# Patient Record
Sex: MALE | Race: WHITE | Employment: OTHER | URBAN - METROPOLITAN AREA
[De-identification: names, ages, dates, MRNs, and addresses within clinical notes are randomized per-mention and may not be internally consistent; named-entity substitution may affect disease eponyms.]

---

## 2022-11-29 ENCOUNTER — APPOINTMENT (OUTPATIENT)
Dept: GENERAL RADIOLOGY | Age: 77
DRG: 377 | End: 2022-11-29
Attending: INTERNAL MEDICINE
Payer: MEDICARE

## 2022-11-29 ENCOUNTER — HOSPITAL ENCOUNTER (INPATIENT)
Age: 77
LOS: 15 days | Discharge: HOME HEALTH | DRG: 377 | End: 2022-12-14
Attending: INTERNAL MEDICINE | Admitting: INTERNAL MEDICINE
Payer: MEDICARE

## 2022-11-29 PROBLEM — K92.2 UPPER GI BLEED: Status: ACTIVE | Noted: 2022-11-29

## 2022-11-29 LAB
ABSOLUTE EOS #: 0.16 K/UL (ref 0–0.4)
ABSOLUTE IMMATURE GRANULOCYTE: 0 K/UL (ref 0–0.3)
ABSOLUTE LYMPH #: 1.75 K/UL (ref 1–4.8)
ABSOLUTE MONO #: 0.95 K/UL (ref 0.1–0.8)
ALBUMIN SERPL-MCNC: 2.7 G/DL (ref 3.5–5.2)
ALBUMIN/GLOBULIN RATIO: 1.7 (ref 1–2.5)
ALP BLD-CCNC: 80 U/L (ref 40–129)
ALT SERPL-CCNC: 16 U/L (ref 5–41)
ANION GAP SERPL CALCULATED.3IONS-SCNC: 12 MMOL/L (ref 9–17)
AST SERPL-CCNC: 73 U/L
BASOPHILS # BLD: 0 % (ref 0–2)
BASOPHILS ABSOLUTE: 0 K/UL (ref 0–0.2)
BILIRUB SERPL-MCNC: 0.3 MG/DL (ref 0.3–1.2)
BILIRUBIN DIRECT: 0.1 MG/DL
BILIRUBIN, INDIRECT: 0.2 MG/DL (ref 0–1)
BUN BLDV-MCNC: 35 MG/DL (ref 8–23)
CALCIUM SERPL-MCNC: 7.7 MG/DL (ref 8.6–10.4)
CASTS UA: NORMAL /LPF (ref 0–8)
CHLORIDE BLD-SCNC: 108 MMOL/L (ref 98–107)
CO2: 17 MMOL/L (ref 20–31)
CREAT SERPL-MCNC: 1.69 MG/DL (ref 0.7–1.2)
EOSINOPHILS RELATIVE PERCENT: 1 % (ref 1–4)
EPITHELIAL CELLS UA: NORMAL /HPF (ref 0–5)
FIO2: 40
GFR SERPL CREATININE-BSD FRML MDRD: 41 ML/MIN/1.73M2
GLUCOSE BLD-MCNC: 271 MG/DL (ref 75–110)
GLUCOSE BLD-MCNC: 283 MG/DL (ref 75–110)
GLUCOSE BLD-MCNC: 283 MG/DL (ref 75–110)
GLUCOSE BLD-MCNC: 348 MG/DL (ref 74–100)
GLUCOSE BLD-MCNC: 365 MG/DL (ref 70–99)
HCT VFR BLD CALC: 21.2 % (ref 40.7–50.3)
HCT VFR BLD CALC: 21.5 % (ref 40.7–50.3)
HCT VFR BLD CALC: 22.8 % (ref 40.7–50.3)
HEMOGLOBIN: 6.9 G/DL (ref 13–17)
HEMOGLOBIN: 7 G/DL (ref 13–17)
HEMOGLOBIN: 7.5 G/DL (ref 13–17)
IMMATURE GRANULOCYTES: 0 %
LACTIC ACID, WHOLE BLOOD: 2.7 MMOL/L (ref 0.7–2.1)
LIPASE: 10 U/L (ref 13–60)
LYMPHOCYTES # BLD: 11 % (ref 24–44)
MAGNESIUM: 2 MG/DL (ref 1.6–2.6)
MCH RBC QN AUTO: 30.6 PG (ref 25.2–33.5)
MCHC RBC AUTO-ENTMCNC: 32.9 G/DL (ref 28.4–34.8)
MCV RBC AUTO: 93.1 FL (ref 82.6–102.9)
MONOCYTES # BLD: 6 % (ref 1–7)
MORPHOLOGY: NORMAL
NEGATIVE BASE EXCESS, ART: 8 (ref 0–2)
NRBC AUTOMATED: 0.2 PER 100 WBC
O2 DEVICE/FLOW/%: ABNORMAL
PDW BLD-RTO: 14.2 % (ref 11.8–14.4)
PLATELET # BLD: 144 K/UL (ref 138–453)
PMV BLD AUTO: 10.2 FL (ref 8.1–13.5)
POC HCO3: 16.2 MMOL/L (ref 21–28)
POC O2 SATURATION: 100 % (ref 94–98)
POC PCO2: 28.6 MM HG (ref 35–48)
POC PH: 7.36 (ref 7.35–7.45)
POC PO2: 178.5 MM HG (ref 83–108)
POTASSIUM SERPL-SCNC: 4.8 MMOL/L (ref 3.7–5.3)
RBC # BLD: 2.45 M/UL (ref 4.21–5.77)
RBC UA: NORMAL /HPF (ref 0–4)
SEG NEUTROPHILS: 82 % (ref 36–66)
SEGMENTED NEUTROPHILS ABSOLUTE COUNT: 13.04 K/UL (ref 1.8–7.7)
SODIUM BLD-SCNC: 137 MMOL/L (ref 135–144)
TOTAL PROTEIN: 4.3 G/DL (ref 6.4–8.3)
TROPONIN, HIGH SENSITIVITY: 1391 NG/L (ref 0–22)
TROPONIN, HIGH SENSITIVITY: 910 NG/L (ref 0–22)
WBC # BLD: 15.9 K/UL (ref 3.5–11.3)
WBC UA: NORMAL /HPF (ref 0–5)

## 2022-11-29 PROCEDURE — 36415 COLL VENOUS BLD VENIPUNCTURE: CPT

## 2022-11-29 PROCEDURE — 85014 HEMATOCRIT: CPT

## 2022-11-29 PROCEDURE — 87641 MR-STAPH DNA AMP PROBE: CPT

## 2022-11-29 PROCEDURE — 85025 COMPLETE CBC W/AUTO DIFF WBC: CPT

## 2022-11-29 PROCEDURE — 0W3P8ZZ CONTROL BLEEDING IN GASTROINTESTINAL TRACT, VIA NATURAL OR ARTIFICIAL OPENING ENDOSCOPIC: ICD-10-PCS | Performed by: INTERNAL MEDICINE

## 2022-11-29 PROCEDURE — 6360000002 HC RX W HCPCS

## 2022-11-29 PROCEDURE — 2500000003 HC RX 250 WO HCPCS: Performed by: STUDENT IN AN ORGANIZED HEALTH CARE EDUCATION/TRAINING PROGRAM

## 2022-11-29 PROCEDURE — 2500000003 HC RX 250 WO HCPCS

## 2022-11-29 PROCEDURE — 37799 UNLISTED PX VASCULAR SURGERY: CPT

## 2022-11-29 PROCEDURE — C9113 INJ PANTOPRAZOLE SODIUM, VIA: HCPCS | Performed by: STUDENT IN AN ORGANIZED HEALTH CARE EDUCATION/TRAINING PROGRAM

## 2022-11-29 PROCEDURE — 44366 SMALL BOWEL ENDOSCOPY: CPT | Performed by: INTERNAL MEDICINE

## 2022-11-29 PROCEDURE — 99222 1ST HOSP IP/OBS MODERATE 55: CPT | Performed by: INTERNAL MEDICINE

## 2022-11-29 PROCEDURE — 83605 ASSAY OF LACTIC ACID: CPT

## 2022-11-29 PROCEDURE — 83735 ASSAY OF MAGNESIUM: CPT

## 2022-11-29 PROCEDURE — 2580000003 HC RX 258: Performed by: STUDENT IN AN ORGANIZED HEALTH CARE EDUCATION/TRAINING PROGRAM

## 2022-11-29 PROCEDURE — 99291 CRITICAL CARE FIRST HOUR: CPT | Performed by: INTERNAL MEDICINE

## 2022-11-29 PROCEDURE — 86901 BLOOD TYPING SEROLOGIC RH(D): CPT

## 2022-11-29 PROCEDURE — 2700000000 HC OXYGEN THERAPY PER DAY

## 2022-11-29 PROCEDURE — 2720000010 HC SURG SUPPLY STERILE: Performed by: INTERNAL MEDICINE

## 2022-11-29 PROCEDURE — 82947 ASSAY GLUCOSE BLOOD QUANT: CPT

## 2022-11-29 PROCEDURE — 87040 BLOOD CULTURE FOR BACTERIA: CPT

## 2022-11-29 PROCEDURE — 86920 COMPATIBILITY TEST SPIN: CPT

## 2022-11-29 PROCEDURE — 74018 RADEX ABDOMEN 1 VIEW: CPT

## 2022-11-29 PROCEDURE — 93005 ELECTROCARDIOGRAM TRACING: CPT | Performed by: STUDENT IN AN ORGANIZED HEALTH CARE EDUCATION/TRAINING PROGRAM

## 2022-11-29 PROCEDURE — 83036 HEMOGLOBIN GLYCOSYLATED A1C: CPT

## 2022-11-29 PROCEDURE — 2000000000 HC ICU R&B

## 2022-11-29 PROCEDURE — 86850 RBC ANTIBODY SCREEN: CPT

## 2022-11-29 PROCEDURE — 6360000002 HC RX W HCPCS: Performed by: STUDENT IN AN ORGANIZED HEALTH CARE EDUCATION/TRAINING PROGRAM

## 2022-11-29 PROCEDURE — 94761 N-INVAS EAR/PLS OXIMETRY MLT: CPT

## 2022-11-29 PROCEDURE — 82803 BLOOD GASES ANY COMBINATION: CPT

## 2022-11-29 PROCEDURE — 80048 BASIC METABOLIC PNL TOTAL CA: CPT

## 2022-11-29 PROCEDURE — 5A1955Z RESPIRATORY VENTILATION, GREATER THAN 96 CONSECUTIVE HOURS: ICD-10-PCS | Performed by: INTERNAL MEDICINE

## 2022-11-29 PROCEDURE — 3609013000 HC EGD TRANSORAL CONTROL BLEEDING ANY METHOD: Performed by: INTERNAL MEDICINE

## 2022-11-29 PROCEDURE — 81015 MICROSCOPIC EXAM OF URINE: CPT

## 2022-11-29 PROCEDURE — 71045 X-RAY EXAM CHEST 1 VIEW: CPT

## 2022-11-29 PROCEDURE — 94002 VENT MGMT INPAT INIT DAY: CPT

## 2022-11-29 PROCEDURE — 85018 HEMOGLOBIN: CPT

## 2022-11-29 PROCEDURE — 80076 HEPATIC FUNCTION PANEL: CPT

## 2022-11-29 PROCEDURE — 83690 ASSAY OF LIPASE: CPT

## 2022-11-29 PROCEDURE — 6370000000 HC RX 637 (ALT 250 FOR IP): Performed by: STUDENT IN AN ORGANIZED HEALTH CARE EDUCATION/TRAINING PROGRAM

## 2022-11-29 PROCEDURE — 87086 URINE CULTURE/COLONY COUNT: CPT

## 2022-11-29 PROCEDURE — 51702 INSERT TEMP BLADDER CATH: CPT

## 2022-11-29 PROCEDURE — 86900 BLOOD TYPING SEROLOGIC ABO: CPT

## 2022-11-29 PROCEDURE — 84484 ASSAY OF TROPONIN QUANT: CPT

## 2022-11-29 RX ORDER — NITROGLYCERIN 0.4 MG/1
0.4 TABLET SUBLINGUAL EVERY 5 MIN PRN
Status: ON HOLD | COMMUNITY
End: 2022-12-14 | Stop reason: HOSPADM

## 2022-11-29 RX ORDER — GLIPIZIDE 10 MG/1
10 TABLET, FILM COATED, EXTENDED RELEASE ORAL 2 TIMES DAILY
Status: ON HOLD | COMMUNITY
End: 2022-12-14 | Stop reason: HOSPADM

## 2022-11-29 RX ORDER — INSULIN LISPRO 100 [IU]/ML
0-8 INJECTION, SOLUTION INTRAVENOUS; SUBCUTANEOUS
Status: DISCONTINUED | OUTPATIENT
Start: 2022-11-29 | End: 2022-12-04

## 2022-11-29 RX ORDER — PROPOFOL 10 MG/ML
INJECTION, EMULSION INTRAVENOUS
Status: DISPENSED
Start: 2022-11-29 | End: 2022-11-30

## 2022-11-29 RX ORDER — ONDANSETRON 4 MG/1
4 TABLET, ORALLY DISINTEGRATING ORAL EVERY 8 HOURS PRN
Status: DISCONTINUED | OUTPATIENT
Start: 2022-11-29 | End: 2022-12-14 | Stop reason: HOSPADM

## 2022-11-29 RX ORDER — ACETAMINOPHEN 650 MG/1
650 SUPPOSITORY RECTAL EVERY 6 HOURS PRN
Status: DISCONTINUED | OUTPATIENT
Start: 2022-11-29 | End: 2022-12-14 | Stop reason: HOSPADM

## 2022-11-29 RX ORDER — SODIUM CHLORIDE 9 MG/ML
INJECTION, SOLUTION INTRAVENOUS PRN
Status: DISCONTINUED | OUTPATIENT
Start: 2022-11-29 | End: 2022-12-14 | Stop reason: HOSPADM

## 2022-11-29 RX ORDER — POTASSIUM CHLORIDE 29.8 MG/ML
20 INJECTION INTRAVENOUS PRN
Status: DISCONTINUED | OUTPATIENT
Start: 2022-11-29 | End: 2022-12-14 | Stop reason: HOSPADM

## 2022-11-29 RX ORDER — INSULIN LISPRO 100 [IU]/ML
0-4 INJECTION, SOLUTION INTRAVENOUS; SUBCUTANEOUS NIGHTLY
Status: DISCONTINUED | OUTPATIENT
Start: 2022-11-29 | End: 2022-12-04

## 2022-11-29 RX ORDER — SODIUM CHLORIDE 9 MG/ML
INJECTION, SOLUTION INTRAVENOUS CONTINUOUS
Status: DISCONTINUED | OUTPATIENT
Start: 2022-11-29 | End: 2022-12-05

## 2022-11-29 RX ORDER — PROPOFOL 10 MG/ML
INJECTION, EMULSION INTRAVENOUS
Status: COMPLETED
Start: 2022-11-29 | End: 2022-11-29

## 2022-11-29 RX ORDER — ACETAMINOPHEN 325 MG/1
650 TABLET ORAL EVERY 6 HOURS PRN
Status: DISCONTINUED | OUTPATIENT
Start: 2022-11-29 | End: 2022-12-14 | Stop reason: HOSPADM

## 2022-11-29 RX ORDER — DEXTROSE MONOHYDRATE 100 MG/ML
INJECTION, SOLUTION INTRAVENOUS CONTINUOUS PRN
Status: DISCONTINUED | OUTPATIENT
Start: 2022-11-29 | End: 2022-12-09 | Stop reason: SDUPTHER

## 2022-11-29 RX ORDER — NOREPINEPHRINE BIT/0.9 % NACL 16MG/250ML
INFUSION BOTTLE (ML) INTRAVENOUS
Status: COMPLETED
Start: 2022-11-29 | End: 2022-11-29

## 2022-11-29 RX ORDER — POLYETHYLENE GLYCOL 3350 17 G/17G
17 POWDER, FOR SOLUTION ORAL DAILY PRN
Status: DISCONTINUED | OUTPATIENT
Start: 2022-11-29 | End: 2022-12-14 | Stop reason: HOSPADM

## 2022-11-29 RX ORDER — INSULIN GLARGINE 100 [IU]/ML
10 INJECTION, SOLUTION SUBCUTANEOUS NIGHTLY
Status: DISCONTINUED | OUTPATIENT
Start: 2022-11-29 | End: 2022-11-30

## 2022-11-29 RX ORDER — PROPOFOL 10 MG/ML
5-50 INJECTION, EMULSION INTRAVENOUS CONTINUOUS
Status: DISCONTINUED | OUTPATIENT
Start: 2022-11-29 | End: 2022-12-01

## 2022-11-29 RX ORDER — ONDANSETRON 2 MG/ML
4 INJECTION INTRAMUSCULAR; INTRAVENOUS EVERY 6 HOURS PRN
Status: DISCONTINUED | OUTPATIENT
Start: 2022-11-29 | End: 2022-12-14 | Stop reason: HOSPADM

## 2022-11-29 RX ORDER — SODIUM CHLORIDE 0.9 % (FLUSH) 0.9 %
5-40 SYRINGE (ML) INJECTION PRN
Status: DISCONTINUED | OUTPATIENT
Start: 2022-11-29 | End: 2022-12-14 | Stop reason: HOSPADM

## 2022-11-29 RX ORDER — ESCITALOPRAM OXALATE 10 MG/1
10 TABLET ORAL DAILY
COMMUNITY

## 2022-11-29 RX ORDER — CARVEDILOL 6.25 MG/1
6.25 TABLET ORAL 2 TIMES DAILY WITH MEALS
Status: ON HOLD | COMMUNITY
End: 2022-12-14 | Stop reason: HOSPADM

## 2022-11-29 RX ORDER — INSULIN GLARGINE 100 [IU]/ML
20 INJECTION, SOLUTION SUBCUTANEOUS 2 TIMES DAILY
Status: ON HOLD | COMMUNITY
End: 2022-12-14 | Stop reason: HOSPADM

## 2022-11-29 RX ORDER — POTASSIUM CHLORIDE 7.45 MG/ML
10 INJECTION INTRAVENOUS PRN
Status: DISCONTINUED | OUTPATIENT
Start: 2022-11-29 | End: 2022-12-14 | Stop reason: HOSPADM

## 2022-11-29 RX ORDER — ASPIRIN 81 MG/1
81 TABLET ORAL DAILY
COMMUNITY

## 2022-11-29 RX ORDER — METFORMIN HYDROCHLORIDE 500 MG/1
500 TABLET, EXTENDED RELEASE ORAL 2 TIMES DAILY
COMMUNITY

## 2022-11-29 RX ORDER — OMEPRAZOLE 20 MG/1
20 CAPSULE, DELAYED RELEASE ORAL 2 TIMES DAILY
COMMUNITY

## 2022-11-29 RX ORDER — NOREPINEPHRINE BIT/0.9 % NACL 16MG/250ML
2-100 INFUSION BOTTLE (ML) INTRAVENOUS CONTINUOUS
Status: DISCONTINUED | OUTPATIENT
Start: 2022-11-29 | End: 2022-12-06

## 2022-11-29 RX ORDER — SODIUM CHLORIDE 0.9 % (FLUSH) 0.9 %
5-40 SYRINGE (ML) INJECTION EVERY 12 HOURS SCHEDULED
Status: DISCONTINUED | OUTPATIENT
Start: 2022-11-29 | End: 2022-12-14 | Stop reason: HOSPADM

## 2022-11-29 RX ORDER — ISOSORBIDE MONONITRATE 30 MG/1
30 TABLET, EXTENDED RELEASE ORAL DAILY
Status: ON HOLD | COMMUNITY
End: 2022-12-14 | Stop reason: HOSPADM

## 2022-11-29 RX ORDER — ROSUVASTATIN CALCIUM 5 MG/1
5 TABLET, COATED ORAL DAILY
COMMUNITY

## 2022-11-29 RX ORDER — MAGNESIUM SULFATE IN WATER 40 MG/ML
2000 INJECTION, SOLUTION INTRAVENOUS PRN
Status: DISCONTINUED | OUTPATIENT
Start: 2022-11-29 | End: 2022-12-14 | Stop reason: HOSPADM

## 2022-11-29 RX ADMIN — SODIUM CHLORIDE, PRESERVATIVE FREE 10 ML: 5 INJECTION INTRAVENOUS at 21:08

## 2022-11-29 RX ADMIN — Medication 5 MCG/MIN: at 14:16

## 2022-11-29 RX ADMIN — Medication 2 MCG/MIN: at 12:30

## 2022-11-29 RX ADMIN — INSULIN GLARGINE 10 UNITS: 100 INJECTION, SOLUTION SUBCUTANEOUS at 15:24

## 2022-11-29 RX ADMIN — PROPOFOL 35 MCG/KG/MIN: 10 INJECTION, EMULSION INTRAVENOUS at 22:07

## 2022-11-29 RX ADMIN — SODIUM CHLORIDE: 9 INJECTION, SOLUTION INTRAVENOUS at 14:24

## 2022-11-29 RX ADMIN — PROPOFOL 35 MCG/KG/MIN: 10 INJECTION, EMULSION INTRAVENOUS at 12:30

## 2022-11-29 RX ADMIN — INSULIN LISPRO 2 UNITS: 100 INJECTION, SOLUTION INTRAVENOUS; SUBCUTANEOUS at 19:37

## 2022-11-29 RX ADMIN — SODIUM CHLORIDE 8 MG/HR: 9 INJECTION, SOLUTION INTRAVENOUS at 14:56

## 2022-11-29 RX ADMIN — PROPOFOL 30 MCG/KG/MIN: 10 INJECTION, EMULSION INTRAVENOUS at 17:15

## 2022-11-29 RX ADMIN — Medication 50 MCG/HR: at 13:00

## 2022-11-29 RX ADMIN — PROPOFOL 35 MCG/KG/MIN: 10 INJECTION, EMULSION INTRAVENOUS at 14:22

## 2022-11-29 RX ADMIN — SODIUM CHLORIDE 80 MG: 9 INJECTION, SOLUTION INTRAVENOUS at 14:30

## 2022-11-29 ASSESSMENT — PULMONARY FUNCTION TESTS
PIF_VALUE: 10
PIF_VALUE: 24
PIF_VALUE: 24
PIF_VALUE: 20
PIF_VALUE: 20
PIF_VALUE: 26
PIF_VALUE: 24
PIF_VALUE: 24
PIF_VALUE: 14
PIF_VALUE: 10
PIF_VALUE: 26
PIF_VALUE: 20
PIF_VALUE: 24
PIF_VALUE: 24
PIF_VALUE: 26
PIF_VALUE: 19
PIF_VALUE: 19
PIF_VALUE: 25
PIF_VALUE: 20
PIF_VALUE: 26
PIF_VALUE: 26
PIF_VALUE: 24
PIF_VALUE: 26
PIF_VALUE: 26
PIF_VALUE: 20
PIF_VALUE: 20
PIF_VALUE: 26
PIF_VALUE: 24
PIF_VALUE: 13
PIF_VALUE: 27
PIF_VALUE: 20

## 2022-11-29 NOTE — PROGRESS NOTES
1440 PUSH EGD with control of hemorrhage at bedside room 3021. Patient intubated, sedated. HOB elevated slightly. Post procedure pt left in the care of primary nurse. Bed low & locked, side rails up x 4/4. Airway secure, oral bite block removed.

## 2022-11-29 NOTE — CONSULTS
[facility]    Consult / H&P               Today's Date: 11/29/2022  Patient Name: Lorenza Loja  Date of admission: 11/29/2022 12:20 PM  Patient's age: 68 y. o., 1945  Admission Dx: Upper GI bleed [K92.2]    Reason for Consult:  Cardiac evaluation    Requesting Physician: Danielito Pham MD    CHIEF COMPLAINT:  GI bleed    History Obtained From:  EMR    HISTORY OF PRESENT ILLNESS:      The patient is a 68 y.o. male with a history of CAD status post PCI X 3 in 2012 and CABG X2 with LIMA to distal LAD, SVG to OM1 with exploration of distal PDA 10/25/2022, A. fib with RVR on Coumadin and amiodarone initially presenting from [facility] after he complained of blood per rectum and was given multiple transfusions for anemia. GI was consulted and EGD was done twice last 10/28 with a duodenal ulcer. He was subsequently transferred to MICU at [facility] due to recurrent bleed and GI was consulted again who are planning emergent EGD at bedside today. Patient is currently intubated and sedated on Levophed. Cardiology was consulted for [facility] recommendations. Past Medical History:   has no past medical history on file. Past Surgical History:   has no past surgical history on file.      Home Medications:    Prior to Admission medications    Not on File      Current Facility-Administered Medications: sodium chloride flush 0.9 % injection 5-40 mL, 5-40 mL, IntraVENous, 2 times per day  sodium chloride flush 0.9 % injection 5-40 mL, 5-40 mL, IntraVENous, PRN  0.9 % sodium chloride infusion, , IntraVENous, PRN  ondansetron (ZOFRAN-ODT) disintegrating tablet 4 mg, 4 mg, Oral, Q8H PRN **OR** ondansetron (ZOFRAN) injection 4 mg, 4 mg, IntraVENous, Q6H PRN  polyethylene glycol (GLYCOLAX) packet 17 g, 17 g, Oral, Daily PRN  acetaminophen (TYLENOL) tablet 650 mg, 650 mg, Oral, Q6H PRN **OR** acetaminophen (TYLENOL) suppository 650 mg, 650 mg, Rectal, Q6H PRN  fentaNYL 50 mcg/mL 50 mL infusion,  mcg/hr, IntraVENous, Continuous  sodium phosphate 10 mmol in sodium chloride 0.9 % 250 mL IVPB, 10 mmol, IntraVENous, PRN **OR** sodium phosphate 15 mmol in sodium chloride 0.9 % 250 mL IVPB, 15 mmol, IntraVENous, PRN **OR** sodium phosphate 20 mmol in sodium chloride 0.9 % 500 mL IVPB, 20 mmol, IntraVENous, PRN  magnesium sulfate 2000 mg in 50 mL IVPB premix, 2,000 mg, IntraVENous, PRN  potassium chloride 20 mEq/50 mL IVPB (Central Line), 20 mEq, IntraVENous, PRN **OR** potassium chloride 10 mEq/100 mL IVPB (Peripheral Line), 10 mEq, IntraVENous, PRN  pantoprazole (PROTONIX) 80 mg in sodium chloride 0.9 % 50 mL bolus, 80 mg, IntraVENous, Once  pantoprazole (PROTONIX) 80 mg in sodium chloride 0.9 % 100 mL infusion, 8 mg/hr, IntraVENous, Continuous  norepinephrine (LEVOPHED) 16 mg in sodium chloride 0.9 % 250 mL infusion, 2-100 mcg/min, IntraVENous, Continuous  propofol injection, 5-50 mcg/kg/min, IntraVENous, Continuous  0.9 % sodium chloride infusion, , IntraVENous, Continuous  insulin glargine (LANTUS) injection vial 10 Units, 10 Units, SubCUTAneous, Nightly  insulin lispro (HUMALOG) injection vial 0-8 Units, 0-8 Units, SubCUTAneous, TID WC  insulin lispro (HUMALOG) injection vial 0-4 Units, 0-4 Units, SubCUTAneous, Nightly  glucose chewable tablet 16 g, 4 tablet, Oral, PRN  dextrose bolus 10% 125 mL, 125 mL, IntraVENous, PRN **OR** dextrose bolus 10% 250 mL, 250 mL, IntraVENous, PRN  glucagon (rDNA) injection 1 mg, 1 mg, SubCUTAneous, PRN  dextrose 10 % infusion, , IntraVENous, Continuous PRN    Allergies:  Atorvastatin    Social History:        Family History: family history is not on file. REVIEW OF SYSTEMS:    As above. PHYSICAL EXAM:      Pulse 69   Resp 13   Ht 6' 2\" (1.88 m)   Wt 207 lb 0.2 oz (93.9 kg)   BMI 26.58 kg/m²    Constitutional and General Appearance: Intubated  HEENT: PERRL, no cervical lymphadenopathy. No masses palpable.  Normal oral mucosa  Respiratory:  Normal excursion and expansion without use of accessory muscles  Resp Auscultation: Good respiratory effort. No for increased work of breathing. On auscultation: clear to auscultation bilaterally  Cardiovascular: The apical impulse is not displaced  Heart tones are crisp and normal. regular S1 and S2.  Jugular venous pulsation Normal  The carotid upstroke is normal in amplitude and contour without delay or bruit  Peripheral pulses are symmetrical and full   Abdomen:   No masses or tenderness  Bowel sounds present  Extremities:   No Cyanosis or Clubbing   Lower extremity edema: No   Skin: Warm and dry          Labs:   CBC: No results for input(s): WBC, HGB, HCT, PLT in the last 72 hours. BMP: No results for input(s): NA, K, CO2, BUN, CREATININE, LABGLOM, GLUCOSE in the last 72 hours. BNP: No results for input(s): BNP in the last 72 hours. PT/INR: No results for input(s): PROTIME, INR in the last 72 hours. APTT:No results for input(s): APTT in the last 72 hours. CARDIAC ENZYMES:No results for input(s): CKTOTAL, CKMB, CKMBINDEX, TROPONINI in the last 72 hours. FASTING LIPID PANEL:No results found for: HDL, LDLDIRECT, LDLCALC, TRIG  LIVER PROFILE:No results for input(s): AST, ALT, LABALBU in the last 72 hours. DATA:    Diagnostics:      ECHO:   04/2018  The left ventricle is normal size. There is normal left ventricular wall thickness. Left ventricle systolic function is normal.      The Ejection Fraction is 55-60%. Grade I diastolic dysfunction. Cannot rule out anteroapical hypokinesis seen only in apical 4 chamber views although endocardium   was not sen well. Echo 04/2022    Left Ventricle: Left ventricle appears normal in size. Wall thickness   is normal. Systolic function is low normal to mildly decreased with an   ejection fraction of 50-55%. Left Atrium: Left atrium is normal in size. The left atrial volume   index is 27.1 mL/m2.      Right Ventricle: Right ventricular size appears normal. The right   ventricular basal diameter is 35.0 mm. Systolic function is normal.     Mitral Valve: The leaflets are mildly thickened and exhibit normal   excursion. Aortic Valve: The aortic valve is congenitally bicuspid. The leaflets   exhibit normal excursion. There is moderate sclerosis. There is no   regurgitation. There is mild stenosis. The calculated aortic valve area is   1.69 cm2. The calculated aortic valve peak gradient is 11.83 mmHg. The   calculated aortic valve mean gradient is 6.00 mmHg. IMPRESSION:    Shock likely hypovolemic secondary to rectal bleed, currently on pressor support. CAD status post PCI X 3 in 2012 and CABG X2 with LIMA to distal LAD, SVG to OM1 with exploration of distal PDA 10/25/2022. A. fib which was newly diagnosed in November 2022, on Coumadin and amiodarone. Preserved LVEF on echocardiogram as above. Elevated troponin 910. Duodenal ulcer with duodenitis as per EGD at [facility]. Acute hypoxic respiratory failure secondary to above. Bicuspid Aortic valve. Hypertension. Hyperlipidemia. Chronic LBBB. RECOMMENDATIONS:  Patient is currently high risk for any AC due to ongoing GI bleed and hypovolemic shock. Will await GI clearance before restarting any AC. Repeat one set of troponin. Will continue to follow. Supportive care for now. Talia Bonilla MD       Cardiovascular Fellow  11/29/2022, 3:29 PM    Attending note. The patient was seen and examined agree with above. Intubated and sedated. Presented with GI bleeding. Elevated troponins possible type II and will trend. We will continue to observe for now and okay to hold anticoagulation until cleared by GI service.

## 2022-11-29 NOTE — OP NOTE
EGD      Patient:   Selma Vinson    :    1945    Facility:   [facility]   Referring/PCP: Jon Gutierrez    Procedure:   Esophagogastroduodenoscopy with control of bleeding  Date:     2022   Endoscopist:  Anayeli Haskins MD, [facility]    Indication:   Melena, anemia of acute blood loss requiring multiple units of blood transfusion. Postprocedure diagnosis:   Duodenal ulcer with visible vessel cauterized with bipolar probe, old blood in the stomach. Anesthesia:  None, patient was already intubated and sedated in the intensive care unit. Complications: None    EBL: None from the procedure    Specimen: None    Description of Procedure:  Informed consent was obtained from the patient after explanation of the procedure including indications, description of the procedure,  benefits and possible risks and complications of the procedure, and alternatives. Questions were answered. The patient's history was reviewed and a directed physical examination was performed prior to the procedure. Patient was monitored throughout the procedure with pulse oximetry and periodic assessment of vital signs. Patient was sedated as noted above. With the patient in the left lateral decubitus position, the Olympus videoendoscope was placed in the patient's mouth and under direct visualization passed into the esophagus. Visualization of the esophagus, stomach, and duodenum was performed during both introduction and withdrawal of the endoscope and retroflexed view of the proximal stomach was obtained. The scope was passed to the proximal jejunum. The patient tolerated the procedure well and was taken to the recovery area in good condition. Findings[de-identified]   Esophagus: Normal  Stomach: Stomach had small amount of old blood which was lavaged and cleared. Stomach otherwise appeared normal.  Duodenum: There was a duodenal ulcer in the bulb measuring about 15 mm x 8 mm with a visible vessel.   This was cauterized with bipolar probe at 13 W. There was no bleeding at the end of the procedure. The rest of the duodenum was otherwise normal.  Jejunum: The examined portion of proximal jejunum was normal.    Recommendations:   Continue with PPI drip for another 24 to 48 hours and then twice daily for 8 weeks. Okay to extubate from GI standpoint. Once extubated okay for trial of liquids or tube feeds if fails extubation.     Electronically signed by Angle Hauser MD, FACG  on 11/29/2022 at 3:41 PM

## 2022-11-29 NOTE — H&P
Critical Care - History and Physical Examination    Patient's name:  Barb Mulligan  Medical Record Number: [id number]  Patient's account/billing number: [de-identified]  Patient's YOB: 1945  Age: 68 y.o. Date of Admission: 11/29/2022 12:20 PM  Reason of ICU admission:   Date of History and Physical Examination: 11/29/2022      Primary Care Physician: Froilan Olivera  Attending Physician:    Code Status: Full Code    Chief complaint: GI Bleeding    Reason for ICU admission:       History Of Present Illness:   History was obtained from chart review. Barb Mulligan is a 68 y.o. presented from [facility] with past medical history significant for CAD s/p CABG 10/25/2022, atrial fibrillation on Coumadin, diabetes, , HTN, HLD, depression. Presented for painless rectal bleeding multiple times with hg dropped from 8.6 to 6.8. received 2 units PRBC, 2 units FFP, and 1 unit cryoprecipitate as well as TXA, 10 mg of vitamin K.and 2000 units Kcentra for INR of 3.09. EGD 11/28/2022 and found to have a duodenal ulcer with duodenitis but no active bleeding. He had a colonoscopy with reports of no source of bleeding found. According to the chart of transfer, patient had multiple episodes of hematemesis and the repeat EGD showed old blood and no active duodenal ulcer treated with epinephrine. Patient was intebated and transfereed to [facility] for higher care. Past Medical History:  CAD S/p CABG, HTN, HLD    Past Surgical History:  CABG    Allergies: Allergies   Allergen Reactions    Atorvastatin Hives         Home Medications:   Prior to Admission medications    Not on File       Social History:   TOBACCO:   has no history on file for tobacco use. ETOH:   has no history on file for alcohol use. DRUGS:  has no history on file for drug use. OCCUPATION:      Family History:   No family history on file.         REVIEW OF SYSTEMS (ROS):  Review of Systems - Negative except mentioned in HPI General ROS: negative  Psychological ROS: negative  Ophthalmic ROS: negative  ENT: negative  Hematological and Lymphatic ROS: negative  Endocrine ROS: negative  Breast ROS: negative  Respiratory ROS: no cough, shortness of breath, or wheezing  Cardiovascular ROS: no chest pain or dyspnea on exertion  Gastrointestinal ROS:negative  Genito-Urinary ROS: negative  Musculoskeletal ROS: negative  Neurological ROS: negative  Dermatological ROS: negative      Physical Exam:    Vitals: Pulse 70   Temp 98.4 °F (36.9 °C) (Oral)   Resp 10   Ht 6' 2\" (1.88 m)   Wt 207 lb 0.2 oz (93.9 kg)   SpO2 100%   BMI 26.58 kg/m²     Body weight:   Wt Readings from Last 3 Encounters:   11/29/22 207 lb 0.2 oz (93.9 kg)       Body Mass Index : Body mass index is 26.58 kg/m². PHYSICAL EXAMINATION :  Constitutional: Appears well, in no distress  EENT: PERRLA, EOMI, sclera clear, anicteric, oropharynx clear, no lesions, neck supple with midline trachea. Neck: Supple, symmetrical, trachea midline, no adenopathy, thyroid symmetric, no jvd skin normal  Respiratory: ET, clear to auscultation, no wheezes or rales and unlabored breathing. No intercostal tenderness  Cardiovascular: regular rate and rhythm, normal S1, S2, no murmur noted and 2+ pulses throughout  Abdomen: soft, nontender, nondistended, no masses or organomegaly  Neurological:  Extremities:  peripheral pulses normal, no pedal edema, no clubbing or cyanosis      Laboratory findings:-    CBC:   Recent Labs     11/29/22  1406   WBC 15.9*   HGB 7.5*        BMP:    Recent Labs     11/29/22  1406      K 4.8   *   CO2 17*   BUN 35*   CREATININE 1.69*   GLUCOSE 365*     S. Calcium:  Recent Labs     11/29/22  1406   CALCIUM 7.7*     S. Ionized Calcium:No results for input(s): IONCA in the last 72 hours. S. Magnesium:No results for input(s): MG in the last 72 hours. S. Phosphorus:No results for input(s): PHOS in the last 72 hours.   S. Glucose:  Recent Labs 11/29/22  1329   POCGLU 348*     Glycosylated hemoglobin A1C: No results for input(s): LABA1C in the last 72 hours. INR: No results for input(s): INR in the last 72 hours. Hepatic functions:   Recent Labs     11/29/22  1406   ALKPHOS 80   ALT 16   AST 73*   PROT 4.3*   BILITOT 0.3   BILIDIR 0.1   LABALBU 2.7*     Pancreatic functions:No results for input(s): LACTA, AMYLASE in the last 72 hours. S. Lactic Acid: No results for input(s): LACTA in the last 72 hours. Cardiac enzymes:No results for input(s): CKTOTAL, CKMB, CKMBINDEX, TROPONINI in the last 72 hours. BNP:No results for input(s): BNP in the last 72 hours. Lipid profile: No results for input(s): CHOL, TRIG, HDL, LDL, LDLCALC in the last 72 hours. Blood Gases: No results found for: PH, PCO2, PO2, HCO3, O2SAT  Thyroid functions: No results found for: T4, TSH     Urinalysis:     Microbiology:  Cultures during this admission:     Blood cultures:                 [] None drawn      [] Negative             []  Positive (Details:  )  Urine Culture:                   [] None drawn      [] Negative             []  Positive (Details:  )  Sputum Culture:               [] None drawn       [] Negative             []  Positive (Details:  )   Endotracheal aspirate:     [] None drawn       [] Negative             []  Positive (Details:  )         -----------------------------------------------------------------  Radiological reports:    CXR:    Electrocardiogram:     Last Echocardiogram findings:          Assessment and Plan     Patient Active Problem List   Diagnosis    Upper GI bleed         Upper GI bleeding:  - Transfused 3 Units of blood  - Duodenal ulcer  - GI consulted. -     Acute hypoxic respiratory failure secondary to above. - intubated and sedated     DM:  - Lantus 10 nithgtly  - MDSS    CAD s/p CABAG  CAD status post PCI X 3 in 2012 and CABG X2 with LIMA to distal LAD, SVG to OM1 with exploration of distal PDA 10/25/2022    Newly diagnosed A fib   A. fib which was newly diagnosed in November 2022, on Coumadin and amiodarone    HTN  - Home meds held due to low BP    HLD  Lipitor at home    F.A.STEODORA MHenna H.U.G.S. B.I.D. Feeding Diet: Diet NPO  Fluids: NS 50 ml/hr  Family: Updated  Analgesic: Fentanyl  Sedation: Propofol  Thrombo-prophylaxis: [] Enoxaparin, [] Unfract. Heparin Subcutaneously, [x] EPC Cuffs  Mobility: Immobile  Heads up: N/A  Ulcer prophylaxis: [] PPI Agent, [] [id number], [] Sucralfate, [] Other:  Glycemic control: Lantus 10 nightly and MDSS  Spontaneous breathing trial: Tubed today  Bowel regimen/urine output: N/A  Indwelling catheter/lines: CVT less, Lorena, ET, Art line  De-escalation: extubation and transfer down    Prophylaxis:  DVT: Chemical contraindicated at  GI: IV Protonix    Dispo:   To remain in [address] [place] [address] MD Xavier   Internal Medicine - PGY-1  Intensive Care Unit  11/29/2022, 4:01 PM

## 2022-11-29 NOTE — CONSULTS
[facility]    GASTROENTEROLOGY CONSULT    Patient:   Lorenza Loja   :    1945   Facility:   [facility]   Date:    2022  Admission Dx:  Upper GI bleed [K92.2]  Requesting physician: Danielito Pham MD  Reason for consult: GI bleed   CC : GI bleed    SUBJECTIVE     HISTORY OF PRESENT ILLNESS  This is a 68 y.o.   male who was admitted 2022 with Upper GI bleed [K92.2]. We have been asked to see the patient in consultation by Danielito Pham MD for GI bleed. Patient is currently intubated. Information obtained from the chart of [facility] as well as our staff who received report from [facility]. 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 Hemoglobin initially 8.6 g/dL and dropped to 6.8 g/dL. He was seen by general surgery Dr. Myla Snider, Per chart, patient had multiple tarry and maroonish colored bowel movements. He did undergo a EGD 2022 and found to have a duodenal ulcer with duodenitis but no active bleeding. He had a colonoscopy with reports of no source of bleeding found. Per chart, early this a.m., patient was reported to have vomiting blood as well as bright red blood per rectum and became hypotensive and tachycardic. Hemoglobin declined to 6.4 g/dL where he received 2 units PRBC, 2 units FFP, and 1 unit cryoprecipitate as well as TXA, 10 mg of vitamin K.and 2000 units Kcentra for INR of 3.09. Patient was intubated and repeat EGD was performed this morning showing \"a fair amount of old blood in the fundus but no other old blood\". Large duodenal ulcer that was not actively bleeding though treated with epinephrine. Patient was  transfered  to  for higher level of care . Patient seen and examined while in the ICU. Patient currently intubated, sedated on propofol and on pressors. Information obtained from the wife and daughter-in-law at bedside. Wife reports patient was having some nausea and bile colored emesis prior to developing melena. She reports patient had a huge stool with melena as well as bright red blood on day of presentation to [facility].  She reports a remote history of GI bleed but does not know the specifics as she is recently been  to patient. She reports patient is on aspirin for his heart, otherwise no significant NSAID use. No history of alcohol use. Remote history of tobacco use. Location/Symptom:   Timing/Onset:   Provocation:   Quality:   Radiation:   Severity:   Timing/Duration:   Modifying Factors:         Summary of imaging completed at this time:      Summary of labs completed at this time:      Previous GI history:   EGD and colonoscopy as above. Approximately 4 colonoscopies in past due to history of polyps      OBJECTIVE:     PAST MEDICAL/SURGICAL HISTORY  As outlined above as well as cholecystectomy and multiple skin surgeries for history of skin cancer    ALLERGIES:  Allergies   Allergen Reactions    Atorvastatin Hives       HOME MEDICATIONS:  Reviewed with with and include aspirin, and Coumadin    CURRENT MEDICATIONS:  Scheduled Meds:   sodium chloride flush  5-40 mL IntraVENous 2 times per day    pantoprazole (PROTONIX) bolus  80 mg IntraVENous Once    insulin glargine  10 Units SubCUTAneous Nightly    insulin lispro  0-8 Units SubCUTAneous TID WC    insulin lispro  0-4 Units SubCUTAneous Nightly     Continuous Infusions:   sodium chloride      fentaNYL 50 mcg/mL 50 mcg/hr (11/29/22 1422)    pantoprazole      norepinephrine 7 mcg/min (11/29/22 1422)    propofol 35 mcg/kg/min (11/29/22 1422)    sodium chloride 50 mL/hr at 11/29/22 1424    dextrose       PRN Meds:    SOCIAL HISTORY:     Tobacco:   Remote history of tobacco use.   Alcohol:   has no history of alcohol use. Illicit drugs:  has no history of drug use. FAMILY HISTORY:     No family history on file. REVIEW OF SYSTEMS:    GLORIA     PHYSICAL EXAM:    Pulse 69   Resp 13   Ht 6' 2\" (1.88 m)   Wt 207 lb 0.2 oz (93.9 kg)   BMI 26.58 kg/m²     GENERAL:   Intubated, sedated, No apparent distress  HEAD:   Normocephalic, Atraumatic  EENT:   EOMI, Sclera not icteric, Oropharynx moist   NECK:   Supple, Trachea midline  LUNGS:  CTA Bilaterally on vent  HEART:  RRR, No murmur auscultated. Recent CABG scar healing well  ABDOMEN:   Soft, Nondistended, BS WNL  EXT:   No clubbing. No cyanosis. No edema. SKIN:   No rashes. No jaundice. No stigmata of liver disease. MUSC/SKEL:   Adequate muscle bulk for patient's age, No significant synovitis, No deformities  NEURO:  GLORIA      LABS AND IMAGING:     CBC  No results for input(s): WBC, HGB, HCT, MCV, MCH, MCHC, PLT in the last 72 hours. IMMATURE PLTs  No results found for: PLTFLUORE    BMP  No results for input(s): NA, K, CL, CO2, BUN, CREATININE, GLUCOSE, CALCIUM in the last 72 hours. Invalid input(s):  MG,  PHOS;3    LFTS  No results for input(s): ALKPHOS, BILITOT, BILIDIR, AST, ALT, PROT, LABALBU in the last 72 hours. AMYLASE/LIPASE/AMMONIA  No results for input(s): AMYLASE, LIPASE, AMMONIA in the last 72 hours. PT/INR  No results for input(s): PROTIME, INR in the last 72 hours. ANEMIA STUDIES  No results for input(s): IRON, LABIRON, TIBC, UIBC, FERRITIN, [id number], FOLATE, OCCULTBLD in the last 72 hours.       LIVER WORK UP:    Acute Hepatitis Panel   No results found for: HEPBSAG, HEPCAB, HEPBIGM, HEPAIGM    HCV Genotype   No results found for: HEPATITISCGENOTYPE    HCV Quantitative   No results found for: HCVQNT    AFP  No results found for: AFP    Alpha 1 antitrypsin   No results found for: A1A    Anti - Liver/Kidney Ab  No results found for: LIVER-KIDNEYMICROSOMALAB    ALFREDITO  No results found for: ALFREDITO    AMA  No results found for: MITOAB    ASMA  No results found for: SMOOTHMUSCAB    Ceruloplasmin  No results found for: CERULOPLSM    Celiac panel  No results found for: TISSTRNTIIGG, TTGIGA, IGA    IgG  No results found for: IGG    IgM  No results found for: IGM    GGT   No results found for: LABGGT    PT/INR  No results for input(s): PROTIME, INR in the last 72 hours. Cancer Markers:  CEA:  No results found for: CEA  Ca 125:  No results found for:   Ca 19-9:   No results found for:   AFP: No results found for: AFP    Lactic acid:  Recent Labs     11/29/22  1406   LACTACIDWB 2.7*           IMAGING  No results found. IMPRESSION:     1.  GI bleed with melena -suspected secondary to duodenal ulcers  2. Duodenal ulcer on EGDs at OSH  3. Acute blood loss anemia secondary to GI blood  4. CABG 10/2022 on Coumadin  5. Afib       Old records, labs and imaging reviewed. PLAN   1. We will plan for EGD today with push enteroscopy today to assess for any further source of GI bleeding. This was discussed with patient's wife at bedside who agreed for procedure. 2.  Start Protonix drip  3. Monitor serial hemoglobin and hematocrit. Transfuse to keep hemoglobin greater than 8 g/dL (cardiac history)  4. If EGD is nondiagnostic and patient continues to have signs of significant GI bleeding, can consider CTA and or nuclear med bleeding scan  5. Further plans to follow EGD      Initial care time/code: Spent 80 out of 80 minutes on this date of service including chart prep, patient interaction, discussion with primary team, documentation and coordination of care for the above conditions    This plan was formulated in collaboration with Dr. Rhea Hopkins  Thank you for allowing us to participate in the care of your patient. Electronically signed by: JERALD Campbell CNP on 11/29/2022 at 2:37 PM     Please note that this note was generated using a voice recognition dictation software.   Although every effort was made to ensure the accuracy of this automated transcription, some errors in transcription may have occurred.

## 2022-11-30 LAB
ABSOLUTE EOS #: 0.12 K/UL (ref 0–0.44)
ABSOLUTE IMMATURE GRANULOCYTE: 0.07 K/UL (ref 0–0.3)
ABSOLUTE LYMPH #: 1.96 K/UL (ref 1.1–3.7)
ABSOLUTE MONO #: 1.17 K/UL (ref 0.1–1.2)
ACTION: NORMAL
ALLEN TEST: POSITIVE
ANION GAP SERPL CALCULATED.3IONS-SCNC: 9 MMOL/L (ref 9–17)
BASOPHILS # BLD: 0 % (ref 0–2)
BASOPHILS ABSOLUTE: 0.04 K/UL (ref 0–0.2)
BUN BLDV-MCNC: 29 MG/DL (ref 8–23)
CALCIUM SERPL-MCNC: 7.6 MG/DL (ref 8.6–10.4)
CHLORIDE BLD-SCNC: 108 MMOL/L (ref 98–107)
CO2: 16 MMOL/L (ref 20–31)
CREAT SERPL-MCNC: 1.42 MG/DL (ref 0.7–1.2)
CULTURE: NO GROWTH
DATE AND TIME: NORMAL
EKG ATRIAL RATE: 70 BPM
EKG P AXIS: 66 DEGREES
EKG P-R INTERVAL: 164 MS
EKG Q-T INTERVAL: 536 MS
EKG QRS DURATION: 152 MS
EKG QTC CALCULATION (BAZETT): 578 MS
EKG R AXIS: 50 DEGREES
EKG T AXIS: -172 DEGREES
EKG VENTRICULAR RATE: 70 BPM
EOSINOPHILS RELATIVE PERCENT: 1 % (ref 1–4)
FIO2: 30
GFR SERPL CREATININE-BSD FRML MDRD: 51 ML/MIN/1.73M2
GLUCOSE BLD-MCNC: 175 MG/DL (ref 75–110)
GLUCOSE BLD-MCNC: 197 MG/DL (ref 75–110)
GLUCOSE BLD-MCNC: 213 MG/DL (ref 75–110)
GLUCOSE BLD-MCNC: 253 MG/DL (ref 75–110)
GLUCOSE BLD-MCNC: 303 MG/DL (ref 70–99)
GLUCOSE BLD-MCNC: 310 MG/DL (ref 75–110)
GLUCOSE BLD-MCNC: 312 MG/DL (ref 74–100)
HCO3 VENOUS: 18.8 MMOL/L (ref 22–29)
HCT VFR BLD CALC: 22.4 % (ref 40.7–50.3)
HCT VFR BLD CALC: 26.6 % (ref 40.7–50.3)
HCT VFR BLD CALC: 28.1 % (ref 40.7–50.3)
HEMOGLOBIN: 7.5 G/DL (ref 13–17)
HEMOGLOBIN: 8.4 G/DL (ref 13–17)
HEMOGLOBIN: 8.7 G/DL (ref 13–17)
IMMATURE GRANULOCYTES: 1 %
INR BLD: 1.3
LACTIC ACID, WHOLE BLOOD: 1 MMOL/L (ref 0.7–2.1)
LACTIC ACID, WHOLE BLOOD: 1.4 MMOL/L (ref 0.7–2.1)
LACTIC ACID, WHOLE BLOOD: 2.4 MMOL/L (ref 0.7–2.1)
LACTIC ACID, WHOLE BLOOD: 2.6 MMOL/L (ref 0.7–2.1)
LYMPHOCYTES # BLD: 17 % (ref 24–43)
MCH RBC QN AUTO: 31.1 PG (ref 25.2–33.5)
MCHC RBC AUTO-ENTMCNC: 33.5 G/DL (ref 28.4–34.8)
MCV RBC AUTO: 92.9 FL (ref 82.6–102.9)
MODE: ABNORMAL
MONOCYTES # BLD: 10 % (ref 3–12)
MRSA, DNA, NASAL: NEGATIVE
NEGATIVE BASE EXCESS, VEN: 6 (ref 0–2)
NOTIFY: NORMAL
NRBC AUTOMATED: 0.2 PER 100 WBC
O2 DEVICE/FLOW/%: ABNORMAL
O2 SAT, VEN: 44 % (ref 60–85)
PCO2, VEN: 35.2 MM HG (ref 41–51)
PDW BLD-RTO: 14.6 % (ref 11.8–14.4)
PH VENOUS: 7.33 (ref 7.32–7.43)
PLATELET # BLD: 122 K/UL (ref 138–453)
PMV BLD AUTO: 9.8 FL (ref 8.1–13.5)
PO2, VEN: 25.9 MM HG (ref 30–50)
POTASSIUM SERPL-SCNC: 4.4 MMOL/L (ref 3.7–5.3)
PROTHROMBIN TIME: 13.4 SEC (ref 9.1–12.3)
RBC # BLD: 2.41 M/UL (ref 4.21–5.77)
RBC # BLD: ABNORMAL 10*6/UL
READ BACK: YES
SAMPLE SITE: ABNORMAL
SEG NEUTROPHILS: 70 % (ref 36–65)
SEGMENTED NEUTROPHILS ABSOLUTE COUNT: 8.01 K/UL (ref 1.5–8.1)
SODIUM BLD-SCNC: 133 MMOL/L (ref 135–144)
SPECIMEN DESCRIPTION: NORMAL
SPECIMEN DESCRIPTION: NORMAL
TRIGL SERPL-MCNC: 241 MG/DL
TROPONIN, HIGH SENSITIVITY: 1129 NG/L (ref 0–22)
TROPONIN, HIGH SENSITIVITY: 1223 NG/L (ref 0–22)
TROPONIN, HIGH SENSITIVITY: 731 NG/L (ref 0–22)
TROPONIN, HIGH SENSITIVITY: 844 NG/L (ref 0–22)
WBC # BLD: 11.4 K/UL (ref 3.5–11.3)

## 2022-11-30 PROCEDURE — 2000000000 HC ICU R&B

## 2022-11-30 PROCEDURE — 6360000002 HC RX W HCPCS

## 2022-11-30 PROCEDURE — 99291 CRITICAL CARE FIRST HOUR: CPT | Performed by: INTERNAL MEDICINE

## 2022-11-30 PROCEDURE — 2500000003 HC RX 250 WO HCPCS: Performed by: STUDENT IN AN ORGANIZED HEALTH CARE EDUCATION/TRAINING PROGRAM

## 2022-11-30 PROCEDURE — 85610 PROTHROMBIN TIME: CPT

## 2022-11-30 PROCEDURE — 2700000000 HC OXYGEN THERAPY PER DAY

## 2022-11-30 PROCEDURE — 82803 BLOOD GASES ANY COMBINATION: CPT

## 2022-11-30 PROCEDURE — 2580000003 HC RX 258: Performed by: STUDENT IN AN ORGANIZED HEALTH CARE EDUCATION/TRAINING PROGRAM

## 2022-11-30 PROCEDURE — 36430 TRANSFUSION BLD/BLD COMPNT: CPT

## 2022-11-30 PROCEDURE — 36600 WITHDRAWAL OF ARTERIAL BLOOD: CPT

## 2022-11-30 PROCEDURE — 99232 SBSQ HOSP IP/OBS MODERATE 35: CPT | Performed by: INTERNAL MEDICINE

## 2022-11-30 PROCEDURE — 83605 ASSAY OF LACTIC ACID: CPT

## 2022-11-30 PROCEDURE — 85018 HEMOGLOBIN: CPT

## 2022-11-30 PROCEDURE — APPSS30 APP SPLIT SHARED TIME 16-30 MINUTES: Performed by: INTERNAL MEDICINE

## 2022-11-30 PROCEDURE — 6370000000 HC RX 637 (ALT 250 FOR IP): Performed by: STUDENT IN AN ORGANIZED HEALTH CARE EDUCATION/TRAINING PROGRAM

## 2022-11-30 PROCEDURE — P9016 RBC LEUKOCYTES REDUCED: HCPCS

## 2022-11-30 PROCEDURE — 80048 BASIC METABOLIC PNL TOTAL CA: CPT

## 2022-11-30 PROCEDURE — 86900 BLOOD TYPING SEROLOGIC ABO: CPT

## 2022-11-30 PROCEDURE — 85025 COMPLETE CBC W/AUTO DIFF WBC: CPT

## 2022-11-30 PROCEDURE — 2500000003 HC RX 250 WO HCPCS: Performed by: HEALTH CARE PROVIDER

## 2022-11-30 PROCEDURE — C9113 INJ PANTOPRAZOLE SODIUM, VIA: HCPCS | Performed by: STUDENT IN AN ORGANIZED HEALTH CARE EDUCATION/TRAINING PROGRAM

## 2022-11-30 PROCEDURE — 93010 ELECTROCARDIOGRAM REPORT: CPT | Performed by: INTERNAL MEDICINE

## 2022-11-30 PROCEDURE — 84478 ASSAY OF TRIGLYCERIDES: CPT

## 2022-11-30 PROCEDURE — 94003 VENT MGMT INPAT SUBQ DAY: CPT

## 2022-11-30 PROCEDURE — 36415 COLL VENOUS BLD VENIPUNCTURE: CPT

## 2022-11-30 PROCEDURE — 6360000002 HC RX W HCPCS: Performed by: STUDENT IN AN ORGANIZED HEALTH CARE EDUCATION/TRAINING PROGRAM

## 2022-11-30 PROCEDURE — 82947 ASSAY GLUCOSE BLOOD QUANT: CPT

## 2022-11-30 PROCEDURE — 94761 N-INVAS EAR/PLS OXIMETRY MLT: CPT

## 2022-11-30 PROCEDURE — 85014 HEMATOCRIT: CPT

## 2022-11-30 PROCEDURE — 84484 ASSAY OF TROPONIN QUANT: CPT

## 2022-11-30 RX ORDER — DEXMEDETOMIDINE HYDROCHLORIDE 4 UG/ML
.1-1.5 INJECTION, SOLUTION INTRAVENOUS CONTINUOUS
Status: DISCONTINUED | OUTPATIENT
Start: 2022-11-30 | End: 2022-12-06

## 2022-11-30 RX ORDER — SODIUM CHLORIDE 9 MG/ML
INJECTION, SOLUTION INTRAVENOUS PRN
Status: COMPLETED | OUTPATIENT
Start: 2022-11-30 | End: 2022-11-30

## 2022-11-30 RX ORDER — PROPOFOL 10 MG/ML
INJECTION, EMULSION INTRAVENOUS
Status: COMPLETED
Start: 2022-11-30 | End: 2022-11-30

## 2022-11-30 RX ORDER — SODIUM CHLORIDE 9 MG/ML
INJECTION, SOLUTION INTRAVENOUS PRN
Status: DISCONTINUED | OUTPATIENT
Start: 2022-11-30 | End: 2022-12-08

## 2022-11-30 RX ORDER — INSULIN GLARGINE 100 [IU]/ML
15 INJECTION, SOLUTION SUBCUTANEOUS 2 TIMES DAILY
Status: DISCONTINUED | OUTPATIENT
Start: 2022-11-30 | End: 2022-12-04

## 2022-11-30 RX ORDER — PROPOFOL 10 MG/ML
INJECTION, EMULSION INTRAVENOUS
Status: DISPENSED
Start: 2022-11-30 | End: 2022-11-30

## 2022-11-30 RX ADMIN — SODIUM CHLORIDE 8 MG/HR: 9 INJECTION, SOLUTION INTRAVENOUS at 11:08

## 2022-11-30 RX ADMIN — DEXMEDETOMIDINE HYDROCHLORIDE 0.1 MCG/KG/HR: 4 INJECTION, SOLUTION INTRAVENOUS at 08:06

## 2022-11-30 RX ADMIN — PROPOFOL 35 MCG/KG/MIN: 10 INJECTION, EMULSION INTRAVENOUS at 03:36

## 2022-11-30 RX ADMIN — SODIUM CHLORIDE, PRESERVATIVE FREE 10 ML: 5 INJECTION INTRAVENOUS at 08:25

## 2022-11-30 RX ADMIN — DEXMEDETOMIDINE HYDROCHLORIDE 1 MCG/KG/HR: 4 INJECTION, SOLUTION INTRAVENOUS at 16:08

## 2022-11-30 RX ADMIN — DEXMEDETOMIDINE HYDROCHLORIDE 1.2 MCG/KG/HR: 4 INJECTION, SOLUTION INTRAVENOUS at 20:02

## 2022-11-30 RX ADMIN — SODIUM CHLORIDE: 9 INJECTION, SOLUTION INTRAVENOUS at 01:16

## 2022-11-30 RX ADMIN — Medication 50 MCG/HR: at 12:33

## 2022-11-30 RX ADMIN — SODIUM CHLORIDE, PRESERVATIVE FREE 10 ML: 5 INJECTION INTRAVENOUS at 19:55

## 2022-11-30 RX ADMIN — INSULIN GLARGINE 15 UNITS: 100 INJECTION, SOLUTION SUBCUTANEOUS at 20:58

## 2022-11-30 RX ADMIN — SODIUM CHLORIDE 8 MG/HR: 9 INJECTION, SOLUTION INTRAVENOUS at 01:03

## 2022-11-30 RX ADMIN — SODIUM CHLORIDE: 9 INJECTION, SOLUTION INTRAVENOUS at 11:24

## 2022-11-30 RX ADMIN — SODIUM CHLORIDE 8 MG/HR: 9 INJECTION, SOLUTION INTRAVENOUS at 23:31

## 2022-11-30 RX ADMIN — PROPOFOL 45 MCG/KG/MIN: 10 INJECTION, EMULSION INTRAVENOUS at 07:04

## 2022-11-30 RX ADMIN — PROPOFOL 5 MCG/KG/MIN: 10 INJECTION, EMULSION INTRAVENOUS at 17:59

## 2022-11-30 RX ADMIN — INSULIN LISPRO 4 UNITS: 100 INJECTION, SOLUTION INTRAVENOUS; SUBCUTANEOUS at 12:03

## 2022-11-30 RX ADMIN — DEXMEDETOMIDINE HYDROCHLORIDE 1.2 MCG/KG/HR: 4 INJECTION, SOLUTION INTRAVENOUS at 12:31

## 2022-11-30 RX ADMIN — INSULIN LISPRO 6 UNITS: 100 INJECTION, SOLUTION INTRAVENOUS; SUBCUTANEOUS at 08:22

## 2022-11-30 ASSESSMENT — PULMONARY FUNCTION TESTS
PIF_VALUE: 8
PIF_VALUE: 10
PIF_VALUE: 8
PIF_VALUE: 24
PIF_VALUE: 8
PIF_VALUE: 7
PIF_VALUE: 8
PIF_VALUE: 23
PIF_VALUE: 22
PIF_VALUE: 8
PIF_VALUE: 24
PIF_VALUE: 8
PIF_VALUE: 23
PIF_VALUE: 8
PIF_VALUE: 8
PIF_VALUE: 7
PIF_VALUE: 8
PIF_VALUE: 7
PIF_VALUE: 8
PIF_VALUE: 11
PIF_VALUE: 8
PIF_VALUE: 7
PIF_VALUE: 8
PIF_VALUE: 8
PIF_VALUE: 11
PIF_VALUE: 23
PIF_VALUE: 8
PIF_VALUE: 8
PIF_VALUE: 23
PIF_VALUE: 8
PIF_VALUE: 23
PIF_VALUE: 24
PIF_VALUE: 8
PIF_VALUE: 23
PIF_VALUE: 27
PIF_VALUE: 23
PIF_VALUE: 8

## 2022-11-30 NOTE — PLAN OF CARE
Problem: Confusion  Goal: Confusion, delirium, dementia, or psychosis is improved or at baseline  Description: INTERVENTIONS:  1. Assess for possible contributors to thought disturbance, including medications, impaired vision or hearing, underlying metabolic abnormalities, dehydration, psychiatric diagnoses, and notify attending LIP  2. [place] high risk fall precautions, as indicated  3. Provide frequent short contacts to provide reality reorientation, refocusing and direction  4. Decrease environmental stimuli, including noise as appropriate  5. Monitor and intervene to maintain adequate nutrition, hydration, elimination, sleep and activity  6. If unable to ensure safety without constant attention obtain sitter and review sitter guidelines with assigned personnel  7.  Initiate Psychosocial CNS and Spiritual Care consult, as indicated  11/29/2022 2012 by Rajendra Lawson RN  Outcome: Not Progressing

## 2022-11-30 NOTE — PROGRESS NOTES
[facility]. [facility] Progress Note    Jamaal Recinos is a 68 y.o. male patient. Hospitalization Day:1      Chief consult reason: GI bleed      Subjective: Patient seen and examined. Patient remains intubated and on sedation. Unable to wean from vent today due to restlessness. Staff reports patient had 1 melena bowel movement around 4 AM, none since      Objective:   VITALS:  BP (!) 113/44   Pulse 63   Temp 98.6 °F (37 °C) (Core) Comment: unit PRBCs initiated. Resp 16   Ht 6' 2\" (1.88 m)   Wt 207 lb 0.2 oz (93.9 kg)   SpO2 100%   BMI 26.58 kg/m²   TEMPERATURE:  Current - Temp: 98.6 °F (37 °C) (unit PRBCs initiated.);  Max - Temp  Av.4 °F (36.9 °C)  Min: 98.1 °F (36.7 °C)  Max: 98.6 °F (37 °C)    Physical Assessment:  General appearance: Intubated, sedated  Mental Status:  GLORIA  Lungs:  clear to auscultation bilaterally, normal effort on vent  Heart:  regular rate and rhythm, no murmur  Abdomen:  soft, nondistended, normal bowel sounds, no masses  Extremities:  no edema, no redness, No clubbing  Skin:  warm, dry, no gross lesions or rashes    CURRENT MEDICATIONS:  Scheduled Meds:   propofol        sodium chloride flush  5-40 mL IntraVENous 2 times per day    insulin glargine  10 Units SubCUTAneous Nightly    insulin lispro  0-8 Units SubCUTAneous TID WC    insulin lispro  0-4 Units SubCUTAneous Nightly     Continuous Infusions:   dexmedetomidine      sodium chloride      sodium chloride      fentaNYL 50 mcg/mL 50 mcg/hr (22)    pantoprazole 8 mg/hr (22)    norepinephrine 5 mcg/min (22)    propofol 45 mcg/kg/min (22)    sodium chloride 50 mL/hr at 22    dextrose       PRN Meds:sodium chloride, sodium chloride flush, sodium chloride, ondansetron **OR** ondansetron, polyethylene glycol, acetaminophen **OR** acetaminophen, sodium phosphate IVPB **OR** sodium phosphate IVPB **OR** sodium phosphate IVPB, magnesium sulfate, potassium chloride **OR** potassium chloride, glucose, dextrose bolus **OR** dextrose bolus, glucagon (rDNA), dextrose      Data Review:  LABS and IMAGING:     CBC  Recent Labs     11/29/22  1406 11/29/22  1800 11/29/22  2252 11/30/22  0431   WBC 15.9*  --   --  11.4*   HGB 7.5* 7.0* 6.9* 7.5*   HCT 22.8* 21.2* 21.5* 22.4*   MCV 93.1  --   --  92.9   MCHC 32.9  --   --  33.5   RDW 14.2  --   --  14.6*     --   --  122*       Immature PLTs   No results found for: PLTFLUORE    ANEMIA STUDIES  No results for input(s): LABIRON, TIBC, IRON, FERRITIN, [id number], FOLATE, OCCULTBLD in the last 72 hours.     BMP  Recent Labs     11/29/22  1406 11/30/22  0431    133*   K 4.8 4.4   * 108*   CO2 17* 16*   BUN 35* 29*   CREATININE 1.69* 1.42*   GLUCOSE 365* 303*   CALCIUM 7.7* 7.6*       LFTS  Recent Labs     11/29/22  1406   ALKPHOS 80   ALT 16   AST 73*   BILITOT 0.3   BILIDIR 0.1   LABALBU 2.7*       AMYLASE/LIPASE/AMMONIA  Recent Labs     11/29/22  1406   LIPASE 10*       Acute Hepatitis Panel   No results found for: HEPBSAG, HEPCAB, HEPBIGM, HEPAIGM    HCV Genotype   No results found for: HEPATITISCGENOTYPE    HCV Quantitative   No results found for: HCVQNT    LIVER WORK UP:    AFP  No results found for: AFP    Alpha 1 antitrypsin   No results found for: A1A    Anti - Liver/Kidney Ab  No results found for: LIVER-KIDNEYMICROSOMALAB    ALFREDITO  No results found for: ALFREDITO    AMA  No results found for: MITOAB    ASMA  No results found for: SMOOTHMUSCAB    Ceruloplasmin  No results found for: CERULOPLSM    Celiac panel  No results found for: TISSTRNTIIGG, TTGIGA, IGA    IgG  No results found for: IGG    IgM  No results found for: IGM    GGT   No results found for: LABGGT    PT/INR  Recent Labs     11/30/22  0431   PROTIME 13.4*   INR 1.3       Cancer Markers:  CEA:  No results found for: CEA  Ca 125:  No results found for:   Ca 19-9:   No results found for:   AFP: No results found for: AFP    Lactic acid:  Recent Labs     11/29/22  1406 11/30/22  0204   LACTACIDWB 2.7* 2.4*         Radiology Review:    XR CHEST PORTABLE    Result Date: 11/29/2022  EXAMINATION: ONE XRAY VIEW OF THE CHEST 11/29/2022 1:37 pm COMPARISON: None HISTORY: ORDERING SYSTEM PROVIDED HISTORY: interval follow up TECHNOLOGIST PROVIDED HISTORY: interval follow up FINDINGS: Endotracheal tube tip is 3.2 cm above the madeline. Heart size is within normal limits for technique. There is a left retrocardiac opacity. No pneumothorax is seen. Airspace opacities are seen in the left lower lung. Probable tiny calcified granulomas in the lungs. Endotracheal tube tip is 3.2 cm above the madeline. Left retrocardiac opacity which could represent a layering pleural effusion, atelectasis, and/or infiltrate. XR ABDOMEN FOR NG/OG/NE TUBE PLACEMENT    Result Date: 11/29/2022  EXAMINATION: ONE SUPINE XRAY VIEW(S) OF THE ABDOMEN 11/29/2022 4:18 pm COMPARISON: None. HISTORY: ORDERING SYSTEM PROVIDED HISTORY: Confirmation of course of NG/OG/NE tube and location of tip of tube TECHNOLOGIST PROVIDED HISTORY: Confirmation of course of NG/OG/NE tube and location of tip of tube Portable? ->Yes Reason for Exam: port Supine FINDINGS: There is an enteric tube with its tip projecting over the upper portion of the stomach. Proximal port is in the upper stomach. No evidence of bowel obstruction. Enteric tube in the stomach as above. ENDOSCOPY     Date:                           11/29/2022   Endoscopist:             Anaeyli Haskins MD, [facility]     Indication:   Melena, anemia of acute blood loss requiring multiple units of blood transfusion. Postprocedure diagnosis:   Duodenal ulcer with visible vessel cauterized with bipolar probe, old blood in the stomach. Findings[de-identified]   Esophagus: Normal  Stomach: Stomach had small amount of old blood which was lavaged and cleared.   Stomach otherwise appeared normal.  Duodenum: There was a duodenal ulcer in the bulb measuring about 15 mm x 8 mm with a visible vessel. This was cauterized with bipolar probe at 13 W. There was no bleeding at the end of the procedure. The rest of the duodenum was otherwise normal.  Jejunum: The examined portion of proximal jejunum was normal.     Recommendations:   Continue with PPI drip for another 24 to 48 hours and then twice daily for 8 weeks. Okay to extubate from GI standpoint. Once extubated okay for trial of liquids or tube feeds if fails extubation. Electronically signed by Sudha Garvin MD, FACG  on 11/29/2022 at 3:41 PM    Principal Problem:    Upper GI bleed  Resolved Problems:    * No resolved hospital problems. *       GI Assessment:    1.  GI bleed with melena -suspected secondary to duodenal ulcer  2. Duodenal ulcer on EGD  3. Acute blood loss anemia secondary to GI blood  4. CABG 10/2022   5. Afib on Coumadin      Plan of care:  Continue Protonix drip for another 24 hours then twice daily x8 weeks  Okay to extubate from GI standpoint. Once extubated okay for trial of liquids or tube feeds if fails extubation. Monitor serial hemoglobin and hematocrit. Transfuse to keep hemoglobin > 8 g/dL (cardiac history) or as clinically indicated  5. Ulcer is high risk for rebleed. Time spent reviewing chart, seeing patient, and discussing with attending and ICU staff Around 25 minutes    This plan was formulated in collaboration with Dr. Bernadette Rider  Please feel free to contact me with any questions or concerns. Thank you for allowing me to participate in the care of your patient. JERALD Pichardo CNP on 11/30/2022 at 7:16 AM   [facility]    Please note that this note was generated using a voice recognition dictation software. Although every effort was made to ensure the accuracy of this automated transcription, some errors in transcription may have occurred.

## 2022-11-30 NOTE — PLAN OF CARE
Problem: Safety - Medical Restraint  Goal: Remains free of injury from restraints (Restraint for Interference with Medical Device)  Description: INTERVENTIONS:  1. Determine that other, less restrictive measures have been tried or would not be effective before applying the restraint  2. Evaluate the patient's condition at the time of restraint application  3. Inform patient/family regarding the reason for restraint  4.  Q2H: Monitor safety, psychosocial status, comfort, nutrition and hydration  11/30/2022 0927 by Jose Roldan RN  Outcome: Progressing  11/30/2022 0926 by Jose Roldan RN  Outcome: Progressing  11/30/2022 0847 by Alma Ruggiero RN  Outcome: Progressing  11/30/2022 0847 by Alma Ruggiero RN  Outcome: Progressing  11/30/2022 0846 by Alma Ruggiero RN  Outcome: Progressing  Flowsheets  Taken 11/30/2022 0600  Remains free of injury from restraints (restraint for interference with medical device): Every 2 hours: Monitor safety, psychosocial status, comfort, nutrition and hydration  Taken 11/30/2022 0400  Remains free of injury from restraints (restraint for interference with medical device): Every 2 hours: Monitor safety, psychosocial status, comfort, nutrition and hydration  Taken 11/30/2022 0200  Remains free of injury from restraints (restraint for interference with medical device): Every 2 hours: Monitor safety, psychosocial status, comfort, nutrition and hydration  Taken 11/30/2022 0000  Remains free of injury from restraints (restraint for interference with medical device): Every 2 hours: Monitor safety, psychosocial status, comfort, nutrition and hydration  Taken 11/29/2022 2200  Remains free of injury from restraints (restraint for interference with medical device): Every 2 hours: Monitor safety, psychosocial status, comfort, nutrition and hydration  11/29/2022 2012 by Pat Burroughs RN  Outcome: Progressing  Flowsheets (Taken 11/29/2022 2000 by Kimber Rothman RN)  Remains free of injury from restraints (restraint for interference with medical device): Every 2 hours: Monitor safety, psychosocial status, comfort, nutrition and hydration     Problem: Confusion  Goal: Confusion, delirium, dementia, or psychosis is improved or at baseline  Description: INTERVENTIONS:  1. Assess for possible contributors to thought disturbance, including medications, impaired vision or hearing, underlying metabolic abnormalities, dehydration, psychiatric diagnoses, and notify attending LIP  2. [place] high risk fall precautions, as indicated  3. Provide frequent short contacts to provide reality reorientation, refocusing and direction  4. Decrease environmental stimuli, including noise as appropriate  5. Monitor and intervene to maintain adequate nutrition, hydration, elimination, sleep and activity  6. If unable to ensure safety without constant attention obtain sitter and review sitter guidelines with assigned personnel  7.  Initiate Psychosocial CNS and Spiritual Care consult, as indicated  11/30/2022 0926 by Candie Greene RN  Outcome: Progressing  11/30/2022 0847 by Toni Lainez RN  Outcome: Progressing  11/29/2022 2012 by Saravanan Becker RN  Outcome: Not Progressing

## 2022-11-30 NOTE — PROGRESS NOTES
[facility] [employer] [employer]  Occupational Therapy Not Seen Note    DATE: 2022    NAME: Radha Bang  MRN: [id number]   : 1945      Patient not seen this date for Occupational Therapy due to:    Patient is not appropriate for active participation in OT evaluation/treatment at this time d/t intubation/sedation. Per RN, possible extubation, but questionable d/t pt's agitation.      Next Scheduled Treatment: Check back 2022     Electronically signed by Rodrick Quintero OT on 2022 at 8:08 AM

## 2022-11-30 NOTE — PLAN OF CARE
Problem: Discharge Planning  Goal: Discharge to home or other facility with appropriate resources  Outcome: Progressing     Problem: Safety - Medical Restraint  Goal: Remains free of injury from restraints (Restraint for Interference with Medical Device)  Description: INTERVENTIONS:  1. Determine that other, less restrictive measures have been tried or would not be effective before applying the restraint  2. Evaluate the patient's condition at the time of restraint application  3. Inform patient/family regarding the reason for restraint  4. Q2H: Monitor safety, psychosocial status, comfort, nutrition and hydration  Outcome: Progressing     Problem: Confusion  Goal: Confusion, delirium, dementia, or psychosis is improved or at baseline  Description: INTERVENTIONS:  1. Assess for possible contributors to thought disturbance, including medications, impaired vision or hearing, underlying metabolic abnormalities, dehydration, psychiatric diagnoses, and notify attending LIP  2. [place] high risk fall precautions, as indicated  3. Provide frequent short contacts to provide reality reorientation, refocusing and direction  4. Decrease environmental stimuli, including noise as appropriate  5. Monitor and intervene to maintain adequate nutrition, hydration, elimination, sleep and activity  6. If unable to ensure safety without constant attention obtain sitter and review sitter guidelines with assigned personnel  7. Initiate Psychosocial CNS and Spiritual Care consult, as indicated  Outcome: Not Progressing     Problem: Pain  Goal: Verbalizes/displays adequate comfort level or baseline comfort level  Outcome: Progressing     Problem: Confusion  Goal: Confusion, delirium, dementia, or psychosis is improved or at baseline  Description: INTERVENTIONS:  1.  Assess for possible contributors to thought disturbance, including medications, impaired vision or hearing, underlying metabolic abnormalities, dehydration, psychiatric diagnoses, and notify attending LIP  2. [place] high risk fall precautions, as indicated  3. Provide frequent short contacts to provide reality reorientation, refocusing and direction  4. Decrease environmental stimuli, including noise as appropriate  5. Monitor and intervene to maintain adequate nutrition, hydration, elimination, sleep and activity  6. If unable to ensure safety without constant attention obtain sitter and review sitter guidelines with assigned personnel  7.  Initiate Psychosocial CNS and Spiritual Care consult, as indicated  Outcome: Not Progressing

## 2022-11-30 NOTE — PLAN OF CARE
Problem: Confusion  Goal: Confusion, delirium, dementia, or psychosis is improved or at baseline  Description: INTERVENTIONS:  1. Assess for possible contributors to thought disturbance, including medications, impaired vision or hearing, underlying metabolic abnormalities, dehydration, psychiatric diagnoses, and notify attending LIP  2. [place] high risk fall precautions, as indicated  3. Provide frequent short contacts to provide reality reorientation, refocusing and direction  4. Decrease environmental stimuli, including noise as appropriate  5. Monitor and intervene to maintain adequate nutrition, hydration, elimination, sleep and activity  6. If unable to ensure safety without constant attention obtain sitter and review sitter guidelines with assigned personnel  7.  Initiate Psychosocial CNS and Spiritual Care consult, as indicated  11/30/2022 0847 by Tatum Gramajo RN  Outcome: [id number] INCREASES WITH BEDSIDE ACTIVITY WIT ATTEMPTS TO PULL AT OET TUBE AND GET OUT OF BED.  11/29/2022 2012 by Ashlyn Gutierrez RN  Outcome: Not Progressing

## 2022-11-30 NOTE — PROGRESS NOTES
[facility]   Progress Note                   Date:   11/30/2022  Patient name: Natahn Abrams  Date of admission:  11/29/2022 12:20 PM  MRN:   [id number]  YOB: 1945  PCP: Dale Munoz    Reason for Admission:     Subjective:       Patient seen and examined. Overnight events noted. Currently on 5 mics of Levophed, fentanyl and propofol. Post EGD with visible bleeding duodenal ulcer which was cauterized with bipolar. Hemoglobin mildly dropped to 6.9 due to which 1 unit of PRBC was transfused. Medications:   Scheduled Meds:   sodium chloride flush  5-40 mL IntraVENous 2 times per day    insulin glargine  10 Units SubCUTAneous Nightly    insulin lispro  0-8 Units SubCUTAneous TID WC    insulin lispro  0-4 Units SubCUTAneous Nightly     Continuous Infusions:   dexmedetomidine      sodium chloride      fentaNYL 50 mcg/mL 50 mcg/hr (11/30/22 0659)    pantoprazole 8 mg/hr (11/30/22 0659)    norepinephrine 5 mcg/min (11/30/22 0659)    propofol 45 mcg/kg/min (11/30/22 0659)    sodium chloride 50 mL/hr at 11/30/22 0659    dextrose       CBC:   Recent Labs     11/29/22  1406 11/29/22  1800 11/29/22  2252 11/30/22  0431   WBC 15.9*  --   --  11.4*   HGB 7.5* 7.0* 6.9* 7.5*     --   --  122*     BMP:    Recent Labs     11/29/22  1406 11/30/22  0431    133*   K 4.8 4.4   * 108*   CO2 17* 16*   BUN 35* 29*   CREATININE 1.69* 1.42*   GLUCOSE 365* 303*     Hepatic:   Recent Labs     11/29/22  1406   AST 73*   ALT 16   BILITOT 0.3   ALKPHOS 80     Troponin: No results for input(s): TROPONINI in the last 72 hours. BNP: No results for input(s): BNP in the last 72 hours. Lipids: No results for input(s): CHOL, HDL in the last 72 hours. Invalid input(s): LDLCALCU  INR:   Recent Labs     11/30/22  0431   INR 1.3       Objective:   Vitals: BP (!) 113/44   Pulse 63   Temp 98.6 °F (37 °C) (Core) Comment: unit PRBCs initiated.   Resp 16   Ht 6' 2\" (1.88 m)   Wt 207 lb 0.2 oz (93.9 kg)   SpO2 100%   BMI 26.58 kg/m²   General appearance: Intubated and sedated. HEENT: Head: Normocephalic, no lesions, without obvious abnormality. Neck: no adenopathy, no carotid bruit, no JVD, supple, symmetrical, trachea midline and thyroid not enlarged, symmetric, no tenderness/mass/nodules  Lungs: clear to auscultation bilaterally  Heart: regular rate and rhythm, S1, S2 normal, no murmur, click, rub or gallop  Abdomen: soft, non-tender; bowel sounds normal; no masses,  no organomegaly  Extremities: extremities normal, atraumatic, no cyanosis or edema      DATA:    Diagnostics:       ECHO:   04/2018  The left ventricle is normal size. There is normal left ventricular wall thickness. Left ventricle systolic function is normal.      The Ejection Fraction is 55-60%. Grade I diastolic dysfunction. Cannot rule out anteroapical hypokinesis seen only in apical 4 chamber views although endocardium   was not sen well. Echo 04/2022    Left Ventricle: Left ventricle appears normal in size. Wall thickness   is normal. Systolic function is low normal to mildly decreased with an   ejection fraction of 50-55%. Left Atrium: Left atrium is normal in size. The left atrial volume   index is 27.1 mL/m2. Right Ventricle: Right ventricular size appears normal. The right   ventricular basal diameter is 35.0 mm. Systolic function is normal.     Mitral Valve: The leaflets are mildly thickened and exhibit normal   excursion. Aortic Valve: The aortic valve is congenitally bicuspid. The leaflets   exhibit normal excursion. There is moderate sclerosis. There is no   regurgitation. There is mild stenosis. The calculated aortic valve area is   1.69 cm2. The calculated aortic valve peak gradient is 11.83 mmHg. The   calculated aortic valve mean gradient is 6.00 mmHg. IMPRESSION:    Shock likely hypovolemic secondary to bleeding duodenal ulcer, currently on pressor support.   Status post EGD 11/29/2022 with bipolar cautery of bleeding duodenal ulcer. CAD status post PCI X 3 in 2012 and CABG X2 with LIMA to distal LAD, SVG to OM1 with exploration of distal PDA 10/25/2022. A. fib which was newly diagnosed in November 2022, on Coumadin and amiodarone. Preserved LVEF on echocardiogram as above. Elevated troponin 910 with mild uptrend. Likely secondary to bleed. Acute hypoxic respiratory failure secondary to above. Bicuspid Aortic valve. Hypertension. Hyperlipidemia. Chronic LBBB. RECOMMENDATIONS:  Patient is currently high risk for any AC due to ongoing GI bleed and hypovolemic shock. Will await GI clearance before restarting any AC. Will continue to follow. Supportive care for now. Orville Hernandez MD       Cardiovascular Fellow  Attending note. The patient was seen and examined agree with above evaluation and plan. Intubated and sedated. We will hold anticoagulation until cleared by GI. Continue current medication and supportive therapy.

## 2022-11-30 NOTE — PROGRESS NOTES
Comprehensive Nutrition Assessment    Type and Reason for Visit:  Initial, Consult (TF management (per TF order))    Nutrition Recommendations/Plan:   Modify Tube Feeding-Suggest using Diabetic Formula (Glucerna 1.2) d/t hyperglycemia. Suggest goal of 65 mL/hr to provide 1872 kcal and 94 g pro/day. Will monitor TF tolerance/intake and pt progress/care plans. Malnutrition Assessment:  Malnutrition Status:  Insufficient data (11/30/22 1453)      Nutrition Assessment:    Pt admitted d/t melena. S/p EGD yesterday with following findings: duodenal ulcer with visible vessel cauterized with bipolar probe, old blood in the stomach. GI OKd starting feeds. Noted Standard with Fiber Formula initiated last night and is running at 40 mL/hr (=ordered goal). RD consulted for TF management. RN reports possible extubation today, so may not require TF much longer. Labs reviewed: Glu 303-312 mg/dL, Na 133 mmol/L. Meds reviewed: Lantus, Humalog SS. Nutrition Related Findings:    meds/labs reviewed Wound Type: None       Current Nutrition Intake & Therapies:    Diet NPO  ADULT TUBE FEEDING; Nasogastric; Standard with Fiber; Continuous; 10; Yes; 10; Q 4 hours; 40; 30; Q 4 hours  Current Tube Feeding (TF) Orders:  Feeding Route: Orogastric  Formula: Standard with Fiber  Schedule: Continuous  Feeding Regimen: 40 mL/hr  Current TF & Flush Orders Provides: Standard with Fiber Formula at 40 mL/hr =1440 kcal and 61 g pro/day  Goal TF & Flush Orders Provides: Diabetic 1.2 Formula goal 65 mL/hr =1872 kcal and 94 g pro/day  Additional Calorie Sources:  Propofol at 11.3 mL/hr =298 kcal/day; currently off    Anthropometric Measures:  Height: 6' 2\" (188 cm)  Ideal Body Weight (IBW): 190 lbs (86 kg)    Admission Body Weight: 207 lb 0.2 oz (93.9 kg)  Current Body Weight: 207 lb 0.2 oz (93.9 kg),   IBW. Weight Source: Not Specified  Current BMI (kg/m2): 26.6                          BMI Categories: Overweight (BMI 25.0-29. 9)    Estimated Daily Nutrient Needs:  Energy Requirements Based On: Kcal/kg  Weight Used for Energy Requirements: Current  Energy (kcal/day): 2100 kcal/day  Weight Used for Protein Requirements: Ideal  Protein (g/day): 105-130 g pro/day  Method Used for Fluid Requirements: Other (Comment)  Fluid (ml/day): per MD    Nutrition Diagnosis:   Inadequate oral intake related to impaired respiratory function as evidenced by NPO or clear liquid status due to medical condition, nutrition support - enteral nutrition    Nutrition Interventions:   Food and/or Nutrient Delivery: Modify Tube Feeding-Suggest using Diabetic Formula (Glucerna 1.2) d/t hyperglycemia. Suggest goal of 65 mL/hr to provide 1872 kcal and 94 g pro/day. Nutrition Education/Counseling: No recommendation at this time  Coordination of Nutrition Care: Continue to monitor while inpatient  Plan of Care discussed with: RN    Goals:     Goals: Meet at least 75% of estimated needs, within 7 days       Nutrition Monitoring and Evaluation:   Behavioral-Environmental Outcomes: None Identified  Food/Nutrient Intake Outcomes: Enteral Nutrition Intake/Tolerance  Physical Signs/Symptoms Outcomes: Biochemical Data, Fluid Status or Edema, Nutrition Focused Physical Findings, Skin, Weight, GI Status    Discharge Planning:     Too soon to determine     [address], LD  Contact: [phone]

## 2022-11-30 NOTE — PROGRESS NOTES
Critical Care Team - Daily Progress Note      Date and time: 11/30/2022 6:57 AM  Patient's name:  Brit Jolley  Medical Record Number: [id number]  Patient's account/billing number: [de-identified]  Patient's YOB: 1945  Age: 68 y.o. Date of Admission: 11/29/2022 12:20 PM  Length of stay during current admission: 1      Primary Care Physician: Gus Parker  ICU Attending Physician: Dr. Kevin Berumen Status: Full Code    Reason for ICU admission: No chief complaint on file. Brit Jolley is a 68 y.o. [facility] with past medical history significant for CAD s/p CABG 10/25/2022, atrial fibrillation on Coumadin, diabetes, , HTN, HLD, depression. Presented for painless rectal bleeding multiple times with hg dropped from 8.6 to 6.8. received 2 units PRBC, 2 units FFP, and 1 unit cryoprecipitate as well as TXA, 10 mg of vitamin K.and 2000 units Kcentra for INR of 3.09. EGD 11/28/2022 and found to have a duodenal ulcer with duodenitis but no active bleeding. He had a colonoscopy with reports of no source of bleeding found. According to the chart of transfer, patient had multiple episodes of hematemesis and the repeat EGD showed old blood and no active duodenal ulcer treated with epinephrine. Patient was intebated and transfereed to [facility] for higher care    SUBJECTIVE:     OVERNIGHT EVENTS:           Patient did not tolerate being weaned off of propfol post-procedure yesterday. Became every agitated and aggressive and almost self extubated. Precedex added on this AM to aid in extubation. Transfused 1 unit PRBC overnight for HgB 6.9. S/p EGD yesterday evening --> duodenal ulcer with visible vessel cauterized with bipolar probe, old blood in the stomach. Troponins starting to down trend but remain elevated. Cards was consulted last night and tried to start a hep gtt, but on hold at this time due to GI bleed.  HgB was 7.5 post transfusion, will give second unit PRBC to keep HgB >8 in setting of NSTEMI. Otherwise remains intubated, sedated on 45 mcg/kg/min of propofol and 50mcg/hr of fentanyl. Remains afebrile. Tmax 98.6. Still on small amount of levophed at 5mcg/min. HR in 60s. Maps 60-74. On PRVC 40%/16/650/5. VBG this AM 7.335/35.2    UOP: 1.17L since arrival. 0.7cc/kg/hr. Labs:  Na 133 (137) / K 4.4 (4.8)  BUN 29 (35) / Cr 1.42 (1.69)    Troponin 1129 (1223, 1391, 910) --> cardiology consulted, wanted to start a hep gtt, held at this time in setting of GI bleed. .    WBC 11.4 (15.9)    HgB 7.5 (6.9, 7.0) / Hc 22.4 ( 21.5) -- s/p 1 unit PRBC overnight. Will transfuse one more unit at this time. GI recs post EGD --> continue with PPI drip for another 24 to 48 hours and then twice daily for 8 weeks. F.A.S.T. M. H.U.G.S. B.I.D. Feeding Diet: Diet NPO  ADULT TUBE FEEDING; Nasogastric; Standard with Fiber; Continuous; 10; Yes; 10; Q 4 hours; 40; 30; Q 4 hours  Fluids: [email or web address]  Family: Update as needed  Analgesic: Fent gtt  Sedation: Propofol, adding Precedex to transition   Thrombo-prophylaxis: [] Enoxaparin, [] Unfract. Heparin Subcutaneously, [x] EPC Cuffs  Mobility: nonmobile, intubated  Heads up: 30 degrees  Ulcer prophylaxis: [] PPI Agent, [] V7Odvay, [] Sucralfate, [] Other: PROTONIX GTT  Glycemic control: SSI, lantus 10units nightly  Spontaneous breathing trial: pending   Bowel regimen/urine output: UOP: 1.17L since arrival. 0.7cc/kg/hr.   Indwelling catheter/lines: CVC (11/29), PIVx2, felix (11/29)  De-escalation: sedation, ventilation      AWAKE & FOLLOWING COMMANDS:  [] No   [] Yes    CURRENT VENTILATION STATUS:     [x] Ventilator  [] BIPAP  [] Nasal Cannula [] Room Air      IF INTUBATED, ET TUBE MARKING AT LOWER LIP:       cms    SECRETIONS Amount:  [x] Small [] Moderate  [] Large  [] None  Color:     [] White [] Colored  [] Bloody    SEDATION:  RAAS Score:  [x] Propofol gtt  [] Versed gtt  [] Ativan gtt   [] No Sedation    PARALYZED:  [x] No    [] Yes    DIARRHEA:                [x] No                [] Yes  (C. Difficile status: [] positive                                                                                                                       [] negative                                                                                                                     [] pending)    VASOPRESSORS:  [] No    [x] Yes    If yes -   [x] Levophed       [] Dopamine     [] Vasopressin       [] Dobutamine  [] Phenylephrine         [] Epinephrine    CENTRAL LINES:     [] No   [x] Yes   (Date of Insertion:   )           If yes -     [] Right IJ     [] Left IJ [] Right Femoral [] Left Femoral                   [] Right Subclavian [] Left Subclavian       BENNETT'S CATHETER:   [] No   [x] Yes  (Date of Insertion:   )     URINE OUTPUT:            [x] Good   [] Low              [] Anuric      OBJECTIVE:     VITAL SIGNS:  BP (!) 113/44   Pulse 63   Temp 98.6 °F (37 °C) (Core) Comment: unit PRBCs initiated.   Resp 16   Ht 6' 2\" (1.88 m)   Wt 207 lb 0.2 oz (93.9 kg)   SpO2 100%   BMI 26.58 kg/m²   Tmax over 24 hours:  Temp (24hrs), Av.4 °F (36.9 °C), Min:98.1 °F (36.7 °C), Max:98.6 °F (37 °C)      Patient Vitals for the past 8 hrs:   BP Temp Temp src Pulse Resp SpO2   22 0600 (!) 113/44 -- -- 63 16 100 %   22 0541 -- -- -- 65 16 100 %   22 0500 124/60 -- -- 88 24 98 %   22 0400 (!) 118/39 -- CORE 65 16 100 %   22 0315 -- -- -- 65 16 100 %   22 0300 (!) 134/39 -- -- 65 16 100 %   22 0230 (!) 118/33 -- -- 65 16 100 %   22 0200 (!) 116/38 -- -- 65 16 100 %   22 0145 (!) 123/44 -- -- 65 (!) 0 100 %   22 0140 -- -- -- -- -- 100 %   22 0135 (!) 121/43 -- -- 65 (!) 0 100 %   22 0130 (!) 125/43 98.6 °F (37 °C) CORE 64 16 100 %   22 0115 -- 98.6 °F (37 °C) CORE 64 16 --   22 0100 (!) 120/45 -- -- 64 16 100 %   22 0030 (!) 126/42 -- -- 64 16 100 %   22 0000 (!) 118/42 -- CORE 63 16 100 %   11/29/22 2337 -- -- -- 63 16 100 %   11/29/22 2330 (!) 100/40 -- -- 67 18 100 %   11/29/22 2300 (!) 125/42 -- -- 69 18 98 %         Intake/Output Summary (Last 24 hours) at 11/30/2022 0657  Last data filed at 11/30/2022 0422  Gross per 24 hour   Intake 1437.58 ml   Output 1170 ml   Net 267.58 ml     Date 11/30/22 0000 - 11/30/22 2359   Shift 5464-9632 5471-5876 8295-7305 24 Hour Total   INTAKE   I.V.(mL/kg) 481. 7(5.1)   481. 7(5.1)   Blood(mL/kg) 340(3.6)   340(3.6)   Shift Total(mL/kg) 821.7(8.8)   821.7(8.8)   OUTPUT   Urine(mL/kg/hr) 375   375   Shift Total(mL/kg) 375(4)   375(4)   Weight (kg) 93.9 93.9 93.9 93.9     Wt Readings from Last 3 Encounters:   11/29/22 207 lb 0.2 oz (93.9 kg)     Body mass index is 26.58 kg/m². PHYSICAL EXAM:  Constitutional: intubated, sedated  EENT: PERRLA, EOMI, sclera clear, anicteric, ETT in place.   Neck: Supple, symmetrical, trachea midline, no adenopathy, thyroid symmetric, no jvd skin normal  Respiratory: intubated  Cardiovascular: regular rate and rhythm, normal S1, S2, no murmur noted and 2+ pulses throughout  Abdomen: soft, nontender, nondistended, no masses or organomegaly  NEUROLOGIC: intubated, sedated  Extremities:  peripheral pulses normal, no pedal edema, no clubbing or cyanosis  SKIN: normal coloration and turgor    Any additional physical findings:      MEDICATIONS:  Scheduled Meds:   sodium chloride flush  5-40 mL IntraVENous 2 times per day    insulin glargine  10 Units SubCUTAneous Nightly    insulin lispro  0-8 Units SubCUTAneous TID WC    insulin lispro  0-4 Units SubCUTAneous Nightly     Continuous Infusions:   dexmedetomidine      sodium chloride      fentaNYL 50 mcg/mL 50 mcg/hr (11/30/22 0422)    pantoprazole 8 mg/hr (11/30/22 0422)    norepinephrine 5 mcg/min (11/30/22 0422)    propofol 35 mcg/kg/min (11/30/22 0422)    sodium chloride 50 mL/hr at 11/30/22 0336    dextrose       PRN Meds:   sodium chloride flush, 5-40 mL, PRN  sodium chloride, , PRN  ondansetron, 4 mg, Q8H PRN   Or  ondansetron, 4 mg, Q6H PRN  polyethylene glycol, 17 g, Daily PRN  acetaminophen, 650 mg, Q6H PRN   Or  acetaminophen, 650 mg, Q6H PRN  sodium phosphate IVPB, 10 mmol, PRN   Or  sodium phosphate IVPB, 15 mmol, PRN   Or  sodium phosphate IVPB, 20 mmol, PRN  magnesium sulfate, 2,000 mg, PRN  potassium chloride, 20 mEq, PRN   Or  potassium chloride, 10 mEq, PRN  glucose, 4 tablet, PRN  dextrose bolus, 125 mL, PRN   Or  dextrose bolus, 250 mL, PRN  glucagon (rDNA), 1 mg, PRN  dextrose, , Continuous PRN        SUPPORT DEVICES: [] Ventilator [] BIPAP  [] Nasal Cannula [] Room Air    VENT SETTINGS (Comprehensive) (if applicable):  Vent Information  Equipment Changed: HME, Expiratory Filter  Ventilator Initiate: Yes  Additional Respiratory Assessments  Heart Rate: 63  Resp: 16  SpO2: 100 %  End Tidal CO2: 27 (%)  Position: Semi-Hardy's  Humidification Source: HME  Cuff Pressure (cm H2O):  (mov)    ABGs:   Lab Results   Component Value Date/Time    FIO2 30.0 11/30/2022 05:04 AM     Lactic Acid: No results found for: LACTA      DATA:  Complete Blood Count:   Recent Labs     11/29/22  1406 11/29/22  1800 11/29/22  2252 11/30/22  0431   WBC 15.9*  --   --  11.4*   HGB 7.5* 7.0* 6.9* 7.5*   MCV 93.1  --   --  92.9     --   --  122*   RBC 2.45*  --   --  2.41*   HCT 22.8* 21.2* 21.5* 22.4*   MCH 30.6  --   --  31.1   MCHC 32.9  --   --  33.5   RDW 14.2  --   --  14.6*   MPV 10.2  --   --  9.8        PT/INR:    Lab Results   Component Value Date/Time    PROTIME 13.4 11/30/2022 04:31 AM    INR 1.3 11/30/2022 04:31 AM     PTT:  No results found for: APTT, PTT    Basal Metabolic Profile:   Recent Labs     11/29/22  1406 11/30/22  0431    133*   K 4.8 4.4   BUN 35* 29*   CREATININE 1.69* 1.42*   * 108*   CO2 17* 16*      Magnesium:   Lab Results   Component Value Date/Time    MG 2.0 11/29/2022 10:52 PM     Phosphorus: No results found for: PHOS  S. Calcium:  Recent Labs     11/30/22  0431   CALCIUM 7.6*     S. Ionized Calcium:No results for input(s): IONCA in the last 72 hours. Urinalysis:   Lab Results   Component Value Date/Time    WBCUA 20 TO 50 11/29/2022 01:45 PM    RBCUA 2 TO 5 11/29/2022 01:45 PM       CARDIAC ENZYMES: No results for input(s): CKMB, CKMBINDEX, TROPONINI in the last 72 hours. Invalid input(s): CKTOTAL;3  BNP: No results for input(s): BNP in the last 72 hours. LFTS  Recent Labs     11/29/22  1406   ALKPHOS 80   ALT 16   AST 73*   BILITOT 0.3   BILIDIR 0.1   LABALBU 2.7*       AMYLASE/LIPASE/AMMONIA  Recent Labs     11/29/22  1406   LIPASE 10*       Last 3 Blood Glucose:   Recent Labs     11/29/22  1406 11/30/22  0431   GLUCOSE 365* 303*      HgBA1c:  No results found for: LABA1C      TSH:  No results found for: TSH  ANEMIA STUDIES  No results for input(s): LABIRON, TIBC, FERRITIN, VYZPBAGO14, FOLATE, OCCULTBLD in the last 72 hours. Cultures during this admission:     Blood cultures:                 [] None drawn      [] Negative             []  Positive (Details:  )  Urine Culture:                   [] None drawn      [] Negative             []  Positive (Details:  )  Sputum Culture:               [] None drawn       [] Negative             []  Positive (Details:  )   Endotracheal aspirate:     [] None drawn       [] Negative             []  Positive (Details:  )     Chest Xray (11/30/2022):    ASSESSMENT:     1) Hypovolemic shock 2/2 acute blood loss from UGIB  2) CAD s/p CABG 1 month ago  3) Atrial fibrillation on coumadin  4) NSTEMI  5) ERICA 2/2 hypovolemia 2/2 shock and GI bleed  6) HTN   7) HLD  8) Depression   9) Combined acute gapped metabolic acidosis with acute respiratory alkalosis    PLAN:     Neuro  - intubated, sedated on 45 mcg/kg/min of propofol and 50mcg/hr of fentany  - cont neuro checks    CV  - Still on small amount of levophed at 5mcg/min.   - HR in 60s.  Maps 60-74.  - NSTEMI  - Troponins starting to down trend but remain elevated. - Cards was consulted, appreciate recommendations  - HgB was 7.5 post transfusion, will give second unit PRBC to keep HgB >8 in setting of NSTEMI. - Afib on coumadin   - AC on hold, will discuss with GI about when able to restart  - hold home HTN meds in setting of hemodynamic instability    Resp  - On PRVC 40%/16/650/5.  - VBG this AM 7.335/35.2    GI  - UGI bleed 2/2 duodenal ulcer  -S/p EGD 11/29 --> duodenal ulcer with visible vessel cauterized with bipolar probe, old blood in the stomach.  - GI recs post EGD --> continue with PPI drip for another 24 to 48 hours and then twice daily for 8 weeks. - Diet NPO  ADULT TUBE FEEDING; Nasogastric; Standard with Fiber; Continuous; 10; Yes; 10; Q 4 hours; 40; 30; Q 4 hours  - cont protonix    /Renal  - Na 133 (137) / K 4.4 (4.8)  - BUN 29 (35) / Cr 1.42 (1.69)  - UOP: 1.17L since arrival. 0.7cc/kg/hr. Heme  -  HgB 7.5 (6.9, 7.0) / Hc 22.4 ( 21.5) -- s/p 1 unit PRBC overnight. Will transfuse one more unit at this time. - F/u post transfusion H/H    ID  - Remains afebrile. Tmax 98.6.   - WBC 11.4 (15.9)     Endo  - Gluc <180, no additional insulin reqs    DVT Ppx: SCDs, no AC until cleared by GI  GI Ppx: Protonix gtt  Diet: Tube feeds    Kadie Barry DO, M.D. Department of Internal Medicine/ Critical care  [facility] ([place]             11/30/2022, 6:57 AM  Attending Physician Statement  I have discussed the case of Chandan Ramirez, including pertinent history and exam findings with the resident/fellow/medical student/NP/PA. I have seen and examined the patient and the key elements of the encounter have been performed by me. I agree with the assessment, plan and orders as documented by the resident/fellow/medical student/NP/PA  With changes made to the note as needed. Pt was seen during rounds.   Review of Systems:   In addition to the pertinent positives and negatives as stated within HPI and the review of systems as documented in their notes, all other systems were reviewed when able to and are reported negative.   On the ventilator on 40% oxygen  We will try Precedex and see if that would help the weaning process  Requiring norepinephrine  Cardiology recommends starting heparin, will discuss with GI given the bleeding patient has had  Patient with mild hyponatremia, continue to monitor  Improving renal function  Adjust insulin for hyperglycemia  Triglycerides are increased, continue to monitor  Tolerating tube feed  Leukocytosis, mild  Troponin is increased and cardiology is following patient  Patient is on probiotics and EPC cuffs  Anemia is better  Patient received 2 units of PRBC  ABG without significant acid-base disorder        Kevin Drew MD  11/30/2022  6:53 PM

## 2022-11-30 NOTE — PLAN OF CARE
Problem: Respiratory - Adult  Goal: Achieves optimal ventilation and oxygenation  Outcome: Progressing     Problem: OXYGENATION/RESPIRATORY FUNCTION  Goal: Patient will maintain patent airway  Outcome: Ongoing  Goal: Patient will achieve/maintain normal respiratory rate/effort  Respiratory rate and effort will be within normal limits for the patient  Outcome: Ongoing    Problem: MECHANICAL VENTILATION  Goal: Patient will maintain patent airway  Outcome: Ongoing  Goal: Oral health is maintained or improved  Outcome: Ongoing  Goal: ET tube will be managed safely  Outcome: Ongoing  Goal: Ability to express needs and understand communication  Outcome: Ongoing  Goal: Mobility/activity is maintained at optimum level for patient  Outcome: Ongoing    Problem: ASPIRATION PRECAUTIONS  Goal: Patients risk of aspiration is minimized  Outcome: Ongoing    Problem: SKIN INTEGRITY  Goal: Skin integrity is maintained or improved  Outcome: Ongoing

## 2022-11-30 NOTE — PROGRESS NOTES
Physical Therapy Cancel Note      DATE: 2022    NAME: Staci Gupta  MRN: [id number]   : 1945      Patient not seen this date for Physical Therapy due to:     Other: the patient was weaning at 0am.      Electronically signed by Kayli Wilburn PT on 2022 at 11:45 AM

## 2022-11-30 NOTE — PLAN OF CARE
Problem: Discharge Planning  Goal: Discharge to home or other facility with appropriate resources  11/30/2022 0926 by Carlita Lee RN  Outcome: Progressing  11/29/2022 2012 by Jossy Villaseñor RN  Outcome: Progressing     Problem: Safety - Medical Restraint  Goal: Remains free of injury from restraints (Restraint for Interference with Medical Device)  Description: INTERVENTIONS:  1. Determine that other, less restrictive measures have been tried or would not be effective before applying the restraint  2. Evaluate the patient's condition at the time of restraint application  3. Inform patient/family regarding the reason for restraint  4.  Q2H: Monitor safety, psychosocial status, comfort, nutrition and hydration  11/30/2022 0926 by Carlita Lee RN  Outcome: Progressing  11/30/2022 0847 by Maxine Saenz RN  Outcome: Progressing  11/30/2022 0847 by Maxine Saenz RN  Outcome: Progressing  11/30/2022 0846 by Maxine Saenz RN  Outcome: Progressing  Flowsheets  Taken 11/30/2022 0600  Remains free of injury from restraints (restraint for interference with medical device): Every 2 hours: Monitor safety, psychosocial status, comfort, nutrition and hydration  Taken 11/30/2022 0400  Remains free of injury from restraints (restraint for interference with medical device): Every 2 hours: Monitor safety, psychosocial status, comfort, nutrition and hydration  Taken 11/30/2022 0200  Remains free of injury from restraints (restraint for interference with medical device): Every 2 hours: Monitor safety, psychosocial status, comfort, nutrition and hydration  Taken 11/30/2022 0000  Remains free of injury from restraints (restraint for interference with medical device): Every 2 hours: Monitor safety, psychosocial status, comfort, nutrition and hydration  Taken 11/29/2022 2200  Remains free of injury from restraints (restraint for interference with medical device): Every 2 hours: Monitor safety, psychosocial status, comfort, nutrition and hydration  11/29/2022 2012 by Brandon Gomez RN  Outcome: Progressing  Flowsheets (Taken 11/29/2022 2000 by Silvino Lawler RN)  Remains free of injury from restraints (restraint for interference with medical device): Every 2 hours: Monitor safety, psychosocial status, comfort, nutrition and hydration     Problem: Confusion  Goal: Confusion, delirium, dementia, or psychosis is improved or at baseline  Description: INTERVENTIONS:  1. Assess for possible contributors to thought disturbance, including medications, impaired vision or hearing, underlying metabolic abnormalities, dehydration, psychiatric diagnoses, and notify attending LIP  2. [place] high risk fall precautions, as indicated  3. Provide frequent short contacts to provide reality reorientation, refocusing and direction  4. Decrease environmental stimuli, including noise as appropriate  5. Monitor and intervene to maintain adequate nutrition, hydration, elimination, sleep and activity  6. If unable to ensure safety without constant attention obtain sitter and review sitter guidelines with assigned personnel  7. Initiate Psychosocial CNS and Spiritual Care consult, as indicated  11/30/2022 0926 by Phuong Molina RN  Outcome: Progressing  11/30/2022 0847 by Dodie Cervantes RN  Outcome: Progressing  11/29/2022 2012 by Brandon Gomez RN  Outcome: Not Progressing     Problem: Pain  Goal: Verbalizes/displays adequate comfort level or baseline comfort level  11/30/2022 0926 by Phuong Molina RN  Outcome: Progressing  11/29/2022 2012 by Brandon Gomez RN  Outcome: Progressing     Problem: Respiratory - Adult  Goal: Achieves optimal ventilation and oxygenation  11/30/2022 0926 by Phuong Molina RN  Outcome: Progressing  11/30/2022 0924 by Ed Goldstein RCP  Outcome: Progressing     Problem: Confusion  Goal: Confusion, delirium, dementia, or psychosis is improved or at baseline  Description: INTERVENTIONS:  1.  Assess for possible contributors to thought disturbance, including medications, impaired vision or hearing, underlying metabolic abnormalities, dehydration, psychiatric diagnoses, and notify attending LIP  2. [employer] [place] high risk fall precautions, as indicated  3. Provide frequent short contacts to provide reality reorientation, refocusing and direction  4. Decrease environmental stimuli, including noise as appropriate  5. Monitor and intervene to maintain adequate nutrition, hydration, elimination, sleep and activity  6. If unable to ensure safety without constant attention obtain sitter and review sitter guidelines with assigned personnel  7.  Initiate Psychosocial CNS and Spiritual Care consult, as indicated  11/30/2022 0926 by Iva Perdomo, RN  Outcome: Progressing  11/30/2022 0847 by Raoul Bourgeois, RN  Outcome: Progressing  11/29/2022 2012 by José Miguel Lorenzo, RN  Outcome: Not Progressing

## 2022-12-01 LAB
ABO/RH: NORMAL
ABSOLUTE EOS #: 0.14 K/UL (ref 0–0.44)
ABSOLUTE IMMATURE GRANULOCYTE: 0.03 K/UL (ref 0–0.3)
ABSOLUTE LYMPH #: 1.05 K/UL (ref 1.1–3.7)
ABSOLUTE MONO #: 0.69 K/UL (ref 0.1–1.2)
ALLEN TEST: POSITIVE
ANION GAP SERPL CALCULATED.3IONS-SCNC: 11 MMOL/L (ref 9–17)
ANTIBODY SCREEN: NEGATIVE
ARM BAND NUMBER: NORMAL
BASOPHILS # BLD: 0 % (ref 0–2)
BASOPHILS ABSOLUTE: 0.03 K/UL (ref 0–0.2)
BLD PROD TYP BPU: NORMAL
BLD PROD TYP BPU: NORMAL
BLOOD BANK BLOOD PRODUCT EXPIRATION DATE: NORMAL
BLOOD BANK BLOOD PRODUCT EXPIRATION DATE: NORMAL
BLOOD BANK ISBT PRODUCT BLOOD TYPE: 6200
BLOOD BANK ISBT PRODUCT BLOOD TYPE: 8400
BLOOD BANK PRODUCT CODE: NORMAL
BLOOD BANK PRODUCT CODE: NORMAL
BLOOD BANK UNIT TYPE AND RH: NORMAL
BLOOD BANK UNIT TYPE AND RH: NORMAL
BPU ID: NORMAL
BPU ID: NORMAL
BUN BLDV-MCNC: 20 MG/DL (ref 8–23)
CALCIUM IONIZED: 1.18 MMOL/L (ref 1.13–1.33)
CALCIUM SERPL-MCNC: 7.5 MG/DL (ref 8.6–10.4)
CHLORIDE BLD-SCNC: 106 MMOL/L (ref 98–107)
CO2: 19 MMOL/L (ref 20–31)
CREAT SERPL-MCNC: 1.15 MG/DL (ref 0.7–1.2)
CROSSMATCH RESULT: NORMAL
CROSSMATCH RESULT: NORMAL
DISPENSE STATUS BLOOD BANK: NORMAL
DISPENSE STATUS BLOOD BANK: NORMAL
EOSINOPHILS RELATIVE PERCENT: 2 % (ref 1–4)
ESTIMATED AVERAGE GLUCOSE: 128 MG/DL
EXPIRATION DATE: NORMAL
FIO2: 40
GFR SERPL CREATININE-BSD FRML MDRD: >60 ML/MIN/1.73M2
GLUCOSE BLD-MCNC: 144 MG/DL (ref 75–110)
GLUCOSE BLD-MCNC: 290 MG/DL (ref 75–110)
GLUCOSE BLD-MCNC: 313 MG/DL (ref 75–110)
GLUCOSE BLD-MCNC: 332 MG/DL (ref 70–99)
GLUCOSE BLD-MCNC: 335 MG/DL (ref 74–100)
GLUCOSE BLD-MCNC: 93 MG/DL (ref 75–110)
HBA1C MFR BLD: 6.1 % (ref 4–6)
HCT VFR BLD CALC: 23.8 % (ref 40.7–50.3)
HCT VFR BLD CALC: 25.4 % (ref 40.7–50.3)
HCT VFR BLD CALC: 26.4 % (ref 40.7–50.3)
HEMOGLOBIN: 8 G/DL (ref 13–17)
HEMOGLOBIN: 8.5 G/DL (ref 13–17)
HEMOGLOBIN: 8.5 G/DL (ref 13–17)
IMMATURE GRANULOCYTES: 0 %
LACTIC ACID, WHOLE BLOOD: 1.1 MMOL/L (ref 0.7–2.1)
LACTIC ACID, WHOLE BLOOD: 1.3 MMOL/L (ref 0.7–2.1)
LACTIC ACID, WHOLE BLOOD: 1.7 MMOL/L (ref 0.7–2.1)
LACTIC ACID, WHOLE BLOOD: 2.1 MMOL/L (ref 0.7–2.1)
LYMPHOCYTES # BLD: 12 % (ref 24–43)
MCH RBC QN AUTO: 29.5 PG (ref 25.2–33.5)
MCHC RBC AUTO-ENTMCNC: 32.2 G/DL (ref 28.4–34.8)
MCV RBC AUTO: 91.7 FL (ref 82.6–102.9)
MODE: ABNORMAL
MONOCYTES # BLD: 8 % (ref 3–12)
NEGATIVE BASE EXCESS, ART: 5 (ref 0–2)
NRBC AUTOMATED: 0 PER 100 WBC
O2 DEVICE/FLOW/%: ABNORMAL
PDW BLD-RTO: 16.4 % (ref 11.8–14.4)
PLATELET # BLD: 99 K/UL (ref 138–453)
PMV BLD AUTO: 9.7 FL (ref 8.1–13.5)
POC HCO3: 18.7 MMOL/L (ref 21–28)
POC O2 SATURATION: 97 % (ref 94–98)
POC PCO2: 27.8 MM HG (ref 35–48)
POC PH: 7.43 (ref 7.35–7.45)
POC PO2: 87 MM HG (ref 83–108)
POTASSIUM SERPL-SCNC: 3.9 MMOL/L (ref 3.7–5.3)
RBC # BLD: 2.88 M/UL (ref 4.21–5.77)
RBC # BLD: ABNORMAL 10*6/UL
SAMPLE SITE: ABNORMAL
SEG NEUTROPHILS: 78 % (ref 36–65)
SEGMENTED NEUTROPHILS ABSOLUTE COUNT: 6.56 K/UL (ref 1.5–8.1)
SODIUM BLD-SCNC: 136 MMOL/L (ref 135–144)
TRANSFUSION STATUS: NORMAL
TRANSFUSION STATUS: NORMAL
TROPONIN, HIGH SENSITIVITY: 511 NG/L (ref 0–22)
TROPONIN, HIGH SENSITIVITY: 538 NG/L (ref 0–22)
TROPONIN, HIGH SENSITIVITY: 551 NG/L (ref 0–22)
TROPONIN, HIGH SENSITIVITY: 595 NG/L (ref 0–22)
UNIT DIVISION: 0
UNIT DIVISION: 0
UNIT ISSUE DATE/TIME: NORMAL
UNIT ISSUE DATE/TIME: NORMAL
WBC # BLD: 8.5 K/UL (ref 3.5–11.3)

## 2022-12-01 PROCEDURE — 6360000002 HC RX W HCPCS: Performed by: STUDENT IN AN ORGANIZED HEALTH CARE EDUCATION/TRAINING PROGRAM

## 2022-12-01 PROCEDURE — 36415 COLL VENOUS BLD VENIPUNCTURE: CPT

## 2022-12-01 PROCEDURE — 84484 ASSAY OF TROPONIN QUANT: CPT

## 2022-12-01 PROCEDURE — 2500000003 HC RX 250 WO HCPCS

## 2022-12-01 PROCEDURE — 82947 ASSAY GLUCOSE BLOOD QUANT: CPT

## 2022-12-01 PROCEDURE — 2500000003 HC RX 250 WO HCPCS: Performed by: HEALTH CARE PROVIDER

## 2022-12-01 PROCEDURE — 93005 ELECTROCARDIOGRAM TRACING: CPT | Performed by: STUDENT IN AN ORGANIZED HEALTH CARE EDUCATION/TRAINING PROGRAM

## 2022-12-01 PROCEDURE — APPSS30 APP SPLIT SHARED TIME 16-30 MINUTES: Performed by: INTERNAL MEDICINE

## 2022-12-01 PROCEDURE — 2000000000 HC ICU R&B

## 2022-12-01 PROCEDURE — 80048 BASIC METABOLIC PNL TOTAL CA: CPT

## 2022-12-01 PROCEDURE — 82803 BLOOD GASES ANY COMBINATION: CPT

## 2022-12-01 PROCEDURE — 85018 HEMOGLOBIN: CPT

## 2022-12-01 PROCEDURE — 94003 VENT MGMT INPAT SUBQ DAY: CPT

## 2022-12-01 PROCEDURE — 94761 N-INVAS EAR/PLS OXIMETRY MLT: CPT

## 2022-12-01 PROCEDURE — C9113 INJ PANTOPRAZOLE SODIUM, VIA: HCPCS | Performed by: STUDENT IN AN ORGANIZED HEALTH CARE EDUCATION/TRAINING PROGRAM

## 2022-12-01 PROCEDURE — 2700000000 HC OXYGEN THERAPY PER DAY

## 2022-12-01 PROCEDURE — 82330 ASSAY OF CALCIUM: CPT

## 2022-12-01 PROCEDURE — 99232 SBSQ HOSP IP/OBS MODERATE 35: CPT | Performed by: INTERNAL MEDICINE

## 2022-12-01 PROCEDURE — 6370000000 HC RX 637 (ALT 250 FOR IP): Performed by: STUDENT IN AN ORGANIZED HEALTH CARE EDUCATION/TRAINING PROGRAM

## 2022-12-01 PROCEDURE — 99291 CRITICAL CARE FIRST HOUR: CPT | Performed by: INTERNAL MEDICINE

## 2022-12-01 PROCEDURE — 85025 COMPLETE CBC W/AUTO DIFF WBC: CPT

## 2022-12-01 PROCEDURE — 83605 ASSAY OF LACTIC ACID: CPT

## 2022-12-01 PROCEDURE — 2580000003 HC RX 258: Performed by: STUDENT IN AN ORGANIZED HEALTH CARE EDUCATION/TRAINING PROGRAM

## 2022-12-01 PROCEDURE — 36600 WITHDRAWAL OF ARTERIAL BLOOD: CPT

## 2022-12-01 PROCEDURE — 6360000002 HC RX W HCPCS

## 2022-12-01 PROCEDURE — 2580000003 HC RX 258

## 2022-12-01 PROCEDURE — 85014 HEMATOCRIT: CPT

## 2022-12-01 RX ORDER — LORAZEPAM 2 MG/ML
1 INJECTION INTRAMUSCULAR EVERY 6 HOURS PRN
Status: DISCONTINUED | OUTPATIENT
Start: 2022-12-01 | End: 2022-12-08

## 2022-12-01 RX ORDER — HALOPERIDOL 5 MG/ML
5 INJECTION INTRAMUSCULAR ONCE
Status: COMPLETED | OUTPATIENT
Start: 2022-12-01 | End: 2022-12-01

## 2022-12-01 RX ADMIN — LORAZEPAM 1 MG: 2 INJECTION INTRAMUSCULAR; INTRAVENOUS at 15:10

## 2022-12-01 RX ADMIN — DEXMEDETOMIDINE HYDROCHLORIDE 1.2 MCG/KG/HR: 4 INJECTION, SOLUTION INTRAVENOUS at 06:40

## 2022-12-01 RX ADMIN — DEXMEDETOMIDINE HYDROCHLORIDE 1.4 MCG/KG/HR: 4 INJECTION, SOLUTION INTRAVENOUS at 10:37

## 2022-12-01 RX ADMIN — OLANZAPINE 5 MG: 10 INJECTION, POWDER, FOR SOLUTION INTRAMUSCULAR at 17:54

## 2022-12-01 RX ADMIN — DEXMEDETOMIDINE HYDROCHLORIDE 1.5 MCG/KG/HR: 4 INJECTION, SOLUTION INTRAVENOUS at 21:14

## 2022-12-01 RX ADMIN — SODIUM CHLORIDE, PRESERVATIVE FREE 40 MG: 5 INJECTION INTRAVENOUS at 16:37

## 2022-12-01 RX ADMIN — DEXMEDETOMIDINE HYDROCHLORIDE 1.4 MCG/KG/HR: 4 INJECTION, SOLUTION INTRAVENOUS at 18:33

## 2022-12-01 RX ADMIN — LORAZEPAM 1 MG: 2 INJECTION INTRAMUSCULAR; INTRAVENOUS at 23:59

## 2022-12-01 RX ADMIN — SODIUM CHLORIDE 8 MG/HR: 9 INJECTION, SOLUTION INTRAVENOUS at 08:49

## 2022-12-01 RX ADMIN — HALOPERIDOL LACTATE 5 MG: 5 INJECTION, SOLUTION INTRAMUSCULAR at 14:27

## 2022-12-01 RX ADMIN — INSULIN LISPRO 6 UNITS: 100 INJECTION, SOLUTION INTRAVENOUS; SUBCUTANEOUS at 08:09

## 2022-12-01 RX ADMIN — INSULIN GLARGINE 15 UNITS: 100 INJECTION, SOLUTION SUBCUTANEOUS at 08:12

## 2022-12-01 RX ADMIN — DEXMEDETOMIDINE HYDROCHLORIDE 1.4 MCG/KG/HR: 4 INJECTION, SOLUTION INTRAVENOUS at 15:23

## 2022-12-01 RX ADMIN — PROPOFOL 20 MCG/KG/MIN: 10 INJECTION, EMULSION INTRAVENOUS at 02:54

## 2022-12-01 RX ADMIN — DEXMEDETOMIDINE HYDROCHLORIDE 1.2 MCG/KG/HR: 4 INJECTION, SOLUTION INTRAVENOUS at 01:41

## 2022-12-01 RX ADMIN — DEXMEDETOMIDINE HYDROCHLORIDE 1.2 MCG/KG/HR: 4 INJECTION, SOLUTION INTRAVENOUS at 04:04

## 2022-12-01 RX ADMIN — SODIUM CHLORIDE: 9 INJECTION, SOLUTION INTRAVENOUS at 16:42

## 2022-12-01 RX ADMIN — SODIUM CHLORIDE, PRESERVATIVE FREE 10 ML: 5 INJECTION INTRAVENOUS at 21:26

## 2022-12-01 RX ADMIN — INSULIN LISPRO 4 UNITS: 100 INJECTION, SOLUTION INTRAVENOUS; SUBCUTANEOUS at 11:43

## 2022-12-01 ASSESSMENT — PULMONARY FUNCTION TESTS
PIF_VALUE: 12
PIF_VALUE: 30
PIF_VALUE: 30
PIF_VALUE: 12

## 2022-12-01 NOTE — CARE COORDINATION
Case Management Assessment  Initial Evaluation    Date/Time of Evaluation: 12/1/2022 11:56 AM  Assessment Completed by: Fred Aldana RN    If patient is discharged prior to next notation, then this note serves as note for discharge by case management. Patient Name: Kiley aPul                   YOB: 1945  Diagnosis: Upper GI bleed [K92.2]                   Date / Time: 11/29/2022 12:20 PM    Patient Admission Status: Inpatient   Readmission Risk (Low < 19, Mod (19-27), High > 27): Readmission Risk Score: 13.5    Current PCP: Carlos Renteria  PCP verified by CM? (P) Yes (Leandro Beckham)    Chart Reviewed: Yes      History Provided by: (P) Spouse  Patient Orientation: (P) Sedated, Unable to Assess    Patient Cognition: (P)  (Intubated/ Sedated)    Hospitalization in the last 30 days (Readmission):  No    If yes, Readmission Assessment in CM Navigator will be completed. Advance Directives:      Code Status: Full Code   Patient's Primary Decision Maker is:        Discharge Planning:    Patient lives with: (P) Spouse/Significant Other Type of Home: (P) House  Primary Care Giver: (P) Self  Patient Support Systems include: (P) Children, Spouse/Significant Other   Current Financial resources: (P) Medicare, Other (Comment) ([facility] [place])  Current community resources:    Current services prior to admission: (P) [facility], [facility]            Current DME: (P) Glucometer            Type of Home Care services:  (P) OT, PT, Nursing Services    ADLS  Prior functional level: (P) Independent in ADLs/IADLs  Current functional level:      PT AM-PAC:   /24  OT AM-PAC:   /24    Family can provide assistance at DC: (P) Yes  Would you like Case Management to discuss the discharge plan with any other family members/significant others, and if so, who?     Plans to Return to Present Housing:    Other Identified Issues/Barriers to RETURNING to current housing: currently intubated  Potential Assistance needed at discharge: (P) Home Care            Potential DME:    Patient expects to discharge to: (P) [facility] for transportation at discharge:      Financial    Payor: Zachariah Thongting / Plan: MEDICARE PART A AND B / Product Type: *No Product type* /     Does insurance require precert for SNF: No    Potential assistance Purchasing Medications: (P) No  Meds-to-Beds request: Yes    No Pharmacies Listed    Notes:    Factors facilitating achievement of predicted outcomes: Family support    Barriers to discharge: Medical complications    Additional Case Management Notes: Intubated/ Sedated FiO2 30%, PEEP of 5, OG for TF, sitter in room for agitation., [facility] Current & verified, wife will transport. The Plan for Transition of Care is related to the following treatment goals of Upper GI bleed [N41.1]    IF APPLICABLE: The Patient and/or patient representative Nya London and his family were provided with a choice of provider and agrees with the discharge plan. Freedom of choice list with basic dialogue that supports the patient's individualized plan of care/goals and shares the quality data associated with the providers was provided to: (P) Patient Representative   Patient Representative Name: (P) Shon Dickinson     The Patient and/or Patient Representative Agree with the Discharge Plan?  (P) Yes    Paresh Germain RN  Case Management Department  Ph: [phone] Fax: [phone]

## 2022-12-01 NOTE — PLAN OF CARE
Problem: Discharge Planning  Goal: Discharge to home or other facility with appropriate resources  11/30/2022 2103 by Smiley Adair RN  Outcome: Progressing  11/30/2022 0926 by Chon Lion RN  Outcome: Progressing     Problem: Safety - Medical Restraint  Goal: Remains free of injury from restraints (Restraint for Interference with Medical Device)  Description: INTERVENTIONS:  1. Determine that other, less restrictive measures have been tried or would not be effective before applying the restraint  2. Evaluate the patient's condition at the time of restraint application  3. Inform patient/family regarding the reason for restraint  4.  Q2H: Monitor safety, psychosocial status, comfort, nutrition and hydration  11/30/2022 2103 by Smiley Adair RN  Outcome: Progressing  Flowsheets  Taken 11/30/2022 1800 by Chon Lion RN  Remains free of injury from restraints (restraint for interference with medical device): Every 2 hours: Monitor safety, psychosocial status, comfort, nutrition and hydration  Taken 11/30/2022 1600 by Chon Lion RN  Remains free of injury from restraints (restraint for interference with medical device): Every 2 hours: Monitor safety, psychosocial status, comfort, nutrition and hydration  Taken 11/30/2022 1319 by Chon Lion RN  Remains free of injury from restraints (restraint for interference with medical device): Every 2 hours: Monitor safety, psychosocial status, comfort, nutrition and hydration  Taken 11/30/2022 1200 by Chon Lion RN  Remains free of injury from restraints (restraint for interference with medical device): Every 2 hours: Monitor safety, psychosocial status, comfort, nutrition and hydration  Taken 11/30/2022 1000 by Chon Lion RN  Remains free of injury from restraints (restraint for interference with medical device): Every 2 hours: Monitor safety, psychosocial status, comfort, nutrition and hydration  11/30/2022 0927 by Chon Lion RN  Outcome: Progressing  11/30/2022 0926 by Lm Tariq RN  Outcome: Progressing  11/30/2022 0847 by El Ramos RN  Outcome: Progressing  11/30/2022 0847 by El Ramos RN  Outcome: Progressing  11/30/2022 0846 by El Ramos RN  Outcome: Progressing  Flowsheets  Taken 11/30/2022 0800 by Lm Tariq RN  Remains free of injury from restraints (restraint for interference with medical device): Every 2 hours: Monitor safety, psychosocial status, comfort, nutrition and hydration  Taken 11/30/2022 0600 by El Ramos RN  Remains free of injury from restraints (restraint for interference with medical device): Every 2 hours: Monitor safety, psychosocial status, comfort, nutrition and hydration  Taken 11/30/2022 0400 by El Ramos RN  Remains free of injury from restraints (restraint for interference with medical device): Every 2 hours: Monitor safety, psychosocial status, comfort, nutrition and hydration  Taken 11/30/2022 0200 by El Ramos RN  Remains free of injury from restraints (restraint for interference with medical device): Every 2 hours: Monitor safety, psychosocial status, comfort, nutrition and hydration  Taken 11/30/2022 0000 by El Ramos RN  Remains free of injury from restraints (restraint for interference with medical device): Every 2 hours: Monitor safety, psychosocial status, comfort, nutrition and hydration  Taken 11/29/2022 2200 by El Ramos RN  Remains free of injury from restraints (restraint for interference with medical device): Every 2 hours: Monitor safety, psychosocial status, comfort, nutrition and hydration     Problem: Confusion  Goal: Confusion, delirium, dementia, or psychosis is improved or at baseline  Description: INTERVENTIONS:  1.  Assess for possible contributors to thought disturbance, including medications, impaired vision or hearing, underlying metabolic abnormalities, dehydration, psychiatric diagnoses, and notify attending LIP  2. [place] high risk fall precautions, as indicated  3. Provide frequent short contacts to provide reality reorientation, refocusing and direction  4. Decrease environmental stimuli, including noise as appropriate  5. Monitor and intervene to maintain adequate nutrition, hydration, elimination, sleep and activity  6. If unable to ensure safety without constant attention obtain sitter and review sitter guidelines with assigned personnel  7.  Initiate Psychosocial CNS and Spiritual Care consult, as indicated  11/30/2022 2103 by Lyman Severs, RN  Outcome: Progressing  11/30/2022 0926 by Zoe Flores RN  Outcome: Progressing  11/30/2022 0847 by Laura Abdul RN  Outcome: Progressing     Problem: Pain  Goal: Verbalizes/displays adequate comfort level or baseline comfort level  11/30/2022 2103 by Lyman Severs, RN  Outcome: Progressing  11/30/2022 0926 by Zoe Flores RN  Outcome: Progressing     Problem: Respiratory - Adult  Goal: Achieves optimal ventilation and oxygenation  11/30/2022 2103 by Lyman Severs, RN  Outcome: Progressing  11/30/2022 0926 by Zoe Flores RN  Outcome: Progressing  11/30/2022 0924 by Celsa Keene RCP  Outcome: Progressing     Problem: Nutrition Deficit:  Goal: Optimize nutritional status  Outcome: Progressing

## 2022-12-01 NOTE — PLAN OF CARE
Problem: Discharge Planning  Goal: Discharge to home or other facility with appropriate resources  Outcome: Progressing     Problem: Safety - Medical Restraint  Goal: Remains free of injury from restraints (Restraint for Interference with Medical Device)  Description: INTERVENTIONS:  1. Determine that other, less restrictive measures have been tried or would not be effective before applying the restraint  2. Evaluate the patient's condition at the time of restraint application  3. Inform patient/family regarding the reason for restraint  4. Q2H: Monitor safety, psychosocial status, comfort, nutrition and hydration  Outcome: Progressing  Flowsheets (Taken 12/1/2022 0800)  Remains free of injury from restraints (restraint for interference with medical device):   Determine that other, less restrictive measures have been tried or would not be effective before applying the restraint   Evaluate the patient's condition at the time of restraint application   Inform patient/family regarding the reason for restraint   Every 2 hours: Monitor safety, psychosocial status, comfort, nutrition and hydration     Problem: Confusion  Goal: Confusion, delirium, dementia, or psychosis is improved or at baseline  Description: INTERVENTIONS:  1. Assess for possible contributors to thought disturbance, including medications, impaired vision or hearing, underlying metabolic abnormalities, dehydration, psychiatric diagnoses, and notify attending LIP  2. [place] high risk fall precautions, as indicated  3. Provide frequent short contacts to provide reality reorientation, refocusing and direction  4. Decrease environmental stimuli, including noise as appropriate  5. Monitor and intervene to maintain adequate nutrition, hydration, elimination, sleep and activity  6. If unable to ensure safety without constant attention obtain sitter and review sitter guidelines with assigned personnel  7.  Initiate Psychosocial CNS and Spiritual Care consult, as indicated  Outcome: Progressing     Problem: Pain  Goal: Verbalizes/displays adequate comfort level or baseline comfort level  Outcome: Progressing     Problem: Respiratory - Adult  Goal: Achieves optimal ventilation and oxygenation  Outcome: Progressing     Problem: Nutrition Deficit:  Goal: Optimize nutritional status  Outcome: Progressing     Problem: Skin/Tissue Integrity  Goal: Absence of new skin breakdown  Description: 1. Monitor for areas of redness and/or skin breakdown  2. Assess vascular access sites hourly  3. Every 4-6 hours minimum:  Change oxygen saturation probe site  4. Every 4-6 hours:  If on nasal continuous positive airway pressure, respiratory therapy assess nares and determine need for appliance change or resting period.   Outcome: Progressing

## 2022-12-01 NOTE — PROGRESS NOTES
[facility]   Progress Note                   Date:   12/1/2022  Patient name: Bruno Peñaloza  Date of admission:  11/29/2022 12:20 PM  MRN:   [id number]  YOB: 1945  PCP: Bibiana Powers    Reason for Admission:     Subjective:       Patient seen and examined. No acute event overnight e. Off pressors. Lc IN high 40s but likely due to precede/propofol. On fentanyl, Precedex and propofol. Post EGD with visible bleeding duodenal ulcer which was cauterized with bipolar. Hemoglobin 8.5. Trop downtrending  No AC foir 3 days post egd     Medications:   Scheduled Meds:   insulin glargine  15 Units SubCUTAneous BID    sodium chloride flush  5-40 mL IntraVENous 2 times per day    insulin lispro  0-8 Units SubCUTAneous TID WC    insulin lispro  0-4 Units SubCUTAneous Nightly     Continuous Infusions:   dexmedetomidine 1.2 mcg/kg/hr (12/01/22 0640)    sodium chloride      sodium chloride      fentaNYL 50 mcg/mL 75 mcg/hr (12/01/22 0412)    pantoprazole 8 mg/hr (12/01/22 0400)    norepinephrine Stopped (11/30/22 1916)    propofol 20 mcg/kg/min (12/01/22 0646)    sodium chloride 50 mL/hr at 12/01/22 0400    dextrose       CBC:   Recent Labs     11/29/22  1406 11/29/22  1800 11/30/22  0431 11/30/22  1042 11/30/22  1656 12/01/22  0008 12/01/22  0614   WBC 15.9*  --  11.4*  --   --   --  8.5   HGB 7.5*   < > 7.5*   < > 8.7* 8.5* 8.5*     --  122*  --   --   --  99*    < > = values in this interval not displayed. BMP:    Recent Labs     11/29/22  1406 11/30/22  0431    133*   K 4.8 4.4   * 108*   CO2 17* 16*   BUN 35* 29*   CREATININE 1.69* 1.42*   GLUCOSE 365* 303*       Hepatic:   Recent Labs     11/29/22  1406   AST 73*   ALT 16   BILITOT 0.3   ALKPHOS 80       Troponin: No results for input(s): TROPONINI in the last 72 hours. BNP: No results for input(s): BNP in the last 72 hours. Lipids: No results for input(s): CHOL, HDL in the last 72 hours.     Invalid input(s): LDLCALCU  INR:   Recent Labs     11/30/22  0431   INR 1.3         Objective:   Vitals: BP (!) 113/52   Pulse (!) 49   Temp 98.4 °F (36.9 °C) (Bladder)   Resp 12   Ht 6' 2\" (1.88 m)   Wt 207 lb 3.7 oz (94 kg)   SpO2 98%   BMI 26.61 kg/m²   General appearance: Intubated and sedated. HEENT: Head: Normocephalic, no lesions, without obvious abnormality. Neck: no adenopathy, no carotid bruit, no JVD, supple, symmetrical, trachea midline and thyroid not enlarged, symmetric, no tenderness/mass/nodules  Lungs: clear to auscultation bilaterally  Heart: regular rate and rhythm, S1, S2 normal, no murmur, click, rub or gallop  Abdomen: soft, non-tender; bowel sounds normal; no masses,  no organomegaly  Extremities: extremities normal, atraumatic, no cyanosis or edema      DATA:    Diagnostics:       ECHO:   04/2018  The left ventricle is normal size. There is normal left ventricular wall thickness. Left ventricle systolic function is normal.      The Ejection Fraction is 55-60%. Grade I diastolic dysfunction. Cannot rule out anteroapical hypokinesis seen only in apical 4 chamber views although endocardium   was not sen well. Echo 04/2022    Left Ventricle: Left ventricle appears normal in size. Wall thickness   is normal. Systolic function is low normal to mildly decreased with an   ejection fraction of 50-55%. Left Atrium: Left atrium is normal in size. The left atrial volume   index is 27.1 mL/m2. Right Ventricle: Right ventricular size appears normal. The right   ventricular basal diameter is 35.0 mm. Systolic function is normal.     Mitral Valve: The leaflets are mildly thickened and exhibit normal   excursion. Aortic Valve: The aortic valve is congenitally bicuspid. The leaflets   exhibit normal excursion. There is moderate sclerosis. There is no   regurgitation. There is mild stenosis. The calculated aortic valve area is   1.69 cm2.  The calculated aortic valve peak gradient is 11.83 mmHg. The   calculated aortic valve mean gradient is 6.00 mmHg. IMPRESSION:    Shock likely hypovolemic secondary to bleeding duodenal ulcer, currently on pressor support. Status post EGD 11/29/2022 with bipolar cautery of bleeding duodenal ulcer. CAD status post PCI X 3 in 2012 and CABG X2 with LIMA to distal LAD, SVG to OM1 with exploration of distal PDA 10/25/2022. A. fib which was newly diagnosed in November 2022, on Coumadin and amiodarone. Preserved LVEF on echocardiogram as above. Elevated troponin 910 with mild uptrend. Likely secondary to bleed. Acute hypoxic respiratory failure secondary to above. Bicuspid Aortic valve. Hypertension. Hyperlipidemia. Chronic LBBB. RECOMMENDATIONS:  Patient is currently high risk for any AC due to ongoing GI bleed and hypovolemic shock. Appreciate GI recommendation regarding anticoagulation. Wait for 3 days post EGD. Will continue to follow. Supportive care for now. Oneida Shanks MD  Internal Medicine Resident, PGY3  [address] [place] [address], [facility] [address]  12/1/2022,7:58 AM      Attending note. The patient was seen and examined agree with above evaluation and plan. Intubated and sedated. We will hold anticoagulation until cleared by GI. Continue current medication and supportive therapy.

## 2022-12-01 NOTE — PLAN OF CARE
Problem: Respiratory - Adult  Goal: Achieves optimal ventilation and oxygenation  12/1/2022 1826 by José Miguel Roe  Outcome: Progressing  Flowsheets (Taken 12/1/2022 1826)  Achieves optimal ventilation and oxygenation:   Assess for changes in respiratory status   Assess for changes in mentation and behavior   Position to facilitate oxygenation and minimize respiratory effort   Oxygen supplementation based on oxygen saturation or arterial blood gases   Encourage broncho-pulmonary hygiene including cough, deep breathe, incentive spirometry   Assess the need for suctioning and aspirate as needed   Respiratory therapy support as indicated 72

## 2022-12-01 NOTE — PROGRESS NOTES
[facility]. [facility] Progress Note    Doreen Hager is a 68 y.o. male patient. Hospitalization Day:2      Chief consult reason: GI bleed      Subjective: Patient seen and examined. Patient remains intubated and sedated. Should have 1 stool overnight, no obvious blood      Objective:   VITALS:  BP (!) 113/52   Pulse (!) 49   Temp 98.4 °F (36.9 °C) (Bladder)   Resp 12   Ht 6' 2\" (1.88 m)   Wt 207 lb 3.7 oz (94 kg)   SpO2 98%   BMI 26.61 kg/m²   TEMPERATURE:  Current - Temp: 98.4 °F (36.9 °C);  Max - Temp  Av.2 °F (37.3 °C)  Min: 98.4 °F (36.9 °C)  Max: 100.2 °F (37.9 °C)    Physical Assessment:  General appearance: Intubated, sedated  Mental Status:  GLORIA  Lungs:  clear to auscultation bilaterally, normal effort on vent  Heart:  regular rate and rhythm, no murmur  Abdomen:  soft, nondistended, normal bowel sounds, no masses  Extremities:  no edema, no redness, No clubbing  Skin:  warm, dry, no gross lesions or rashes    CURRENT MEDICATIONS:  Scheduled Meds:   insulin glargine  15 Units SubCUTAneous BID    sodium chloride flush  5-40 mL IntraVENous 2 times per day    insulin lispro  0-8 Units SubCUTAneous TID WC    insulin lispro  0-4 Units SubCUTAneous Nightly     Continuous Infusions:   dexmedetomidine 1.2 mcg/kg/hr (2240)    sodium chloride      sodium chloride      fentaNYL 50 mcg/mL 75 mcg/hr (22)    pantoprazole 8 mg/hr (22)    norepinephrine Stopped (22)    propofol 20 mcg/kg/min (22)    sodium chloride 50 mL/hr at 22    dextrose       PRN Meds:sodium chloride, sodium chloride flush, sodium chloride, ondansetron **OR** ondansetron, polyethylene glycol, acetaminophen **OR** acetaminophen, sodium phosphate IVPB **OR** sodium phosphate IVPB **OR** sodium phosphate IVPB, magnesium sulfate, potassium chloride **OR** potassium chloride, glucose, dextrose bolus **OR** dextrose bolus, glucagon (rDNA), dextrose      Data Review:  LABS and IMAGING:     CBC  Recent Labs     11/29/22  1406 11/29/22  1800 11/30/22  0431 11/30/22  1042 11/30/22  1656 12/01/22  0008 12/01/22  0614   WBC 15.9*  --  11.4*  --   --   --  8.5   HGB 7.5*   < > 7.5* 8.4* 8.7* 8.5* 8.5*   HCT 22.8*   < > 22.4* 28.1* 26.6* 25.4* 26.4*   MCV 93.1  --  92.9  --   --   --  91.7   MCHC 32.9  --  33.5  --   --   --  32.2   RDW 14.2  --  14.6*  --   --   --  16.4*     --  122*  --   --   --  99*    < > = values in this interval not displayed. Immature PLTs   No results found for: PLTFLUORE    ANEMIA STUDIES  No results for input(s): LABIRON, TIBC, IRON, FERRITIN, NFSRRRIS60, FOLATE, OCCULTBLD in the last 72 hours.     BMP  Recent Labs     11/29/22  1406 11/30/22  0431    133*   K 4.8 4.4   * 108*   CO2 17* 16*   BUN 35* 29*   CREATININE 1.69* 1.42*   GLUCOSE 365* 303*   CALCIUM 7.7* 7.6*         LFTS  Recent Labs     11/29/22  1406   ALKPHOS 80   ALT 16   AST 73*   BILITOT 0.3   BILIDIR 0.1   LABALBU 2.7*         AMYLASE/LIPASE/AMMONIA  Recent Labs     11/29/22  1406   LIPASE 10*         Acute Hepatitis Panel   No results found for: HEPBSAG, HEPCAB, HEPBIGM, HEPAIGM    HCV Genotype   No results found for: HEPATITISCGENOTYPE    HCV Quantitative   No results found for: HCVQNT    LIVER WORK UP:    AFP  No results found for: AFP    Alpha 1 antitrypsin   No results found for: A1A    Anti - Liver/Kidney Ab  No results found for: LIVER-KIDNEYMICROSOMALAB    ALFREDITO  No results found for: ALFREDITO    AMA  No results found for: MITOAB    ASMA  No results found for: SMOOTHMUSCAB    Ceruloplasmin  No results found for: CERULOPLSM    Celiac panel  No results found for: TISSTRNTIIGG, TTGIGA, IGA    IgG  No results found for: IGG    IgM  No results found for: IGM    GGT   No results found for: LABGGT    PT/INR  Recent Labs     11/30/22  0431   PROTIME 13.4*   INR 1.3         Cancer Markers:  CEA:  No results found for: CEA  Ca 125:  No results found for:   Ca 19-9:   No results found for:   AFP: No results found for: AFP    Lactic acid:  Recent Labs     11/30/22  1933 12/01/22  0149 12/01/22  0614   LACTACIDWB 1.0 1.1 1.3           Radiology Review:    XR CHEST PORTABLE    Result Date: 11/29/2022  EXAMINATION: ONE XRAY VIEW OF THE CHEST 11/29/2022 1:37 pm COMPARISON: None HISTORY: ORDERING SYSTEM PROVIDED HISTORY: interval follow up TECHNOLOGIST PROVIDED HISTORY: interval follow up FINDINGS: Endotracheal tube tip is 3.2 cm above the madeline. Heart size is within normal limits for technique. There is a left retrocardiac opacity. No pneumothorax is seen. Airspace opacities are seen in the left lower lung. Probable tiny calcified granulomas in the lungs. Endotracheal tube tip is 3.2 cm above the madeline. Left retrocardiac opacity which could represent a layering pleural effusion, atelectasis, and/or infiltrate. XR ABDOMEN FOR NG/OG/NE TUBE PLACEMENT    Result Date: 11/29/2022  EXAMINATION: ONE SUPINE XRAY VIEW(S) OF THE ABDOMEN 11/29/2022 4:18 pm COMPARISON: None. HISTORY: ORDERING SYSTEM PROVIDED HISTORY: Confirmation of course of NG/OG/NE tube and location of tip of tube TECHNOLOGIST PROVIDED HISTORY: Confirmation of course of NG/OG/NE tube and location of tip of tube Portable? ->Yes Reason for Exam: port Supine FINDINGS: There is an enteric tube with its tip projecting over the upper portion of the stomach. Proximal port is in the upper stomach. No evidence of bowel obstruction. Enteric tube in the stomach as above. ENDOSCOPY     Date:                           11/29/2022   Endoscopist:             Jordyn Cruz MD, [facility]     Indication:   Melena, anemia of acute blood loss requiring multiple units of blood transfusion. Postprocedure diagnosis:   Duodenal ulcer with visible vessel cauterized with bipolar probe, old blood in the stomach.   Findings[de-identified]   Esophagus: Normal  Stomach: Stomach had small amount of old blood which was lavaged and cleared. Stomach otherwise appeared normal.  Duodenum: There was a duodenal ulcer in the bulb measuring about 15 mm x 8 mm with a visible vessel. This was cauterized with bipolar probe at 13 W. There was no bleeding at the end of the procedure. The rest of the duodenum was otherwise normal.  Jejunum: The examined portion of proximal jejunum was normal.     Recommendations:   Continue with PPI drip for another 24 to 48 hours and then twice daily for 8 weeks. Okay to extubate from GI standpoint. Once extubated okay for trial of liquids or tube feeds if fails extubation. Electronically signed by María Elena Bingham MD, FACG  on 11/29/2022 at 3:41 PM    Principal Problem:    Upper GI bleed  Resolved Problems:    * No resolved hospital problems. *       GI Assessment:    1.  GI bleed with melena -suspected secondary to duodenal ulcer. Hemoglobin stable  2. Duodenal ulcer on EGD  3. Acute blood loss anemia secondary to GI blood. Hemoglobin stable  4. CABG 10/2022   5. Afib on Coumadin      Plan of care:  Continue Protonix drip for another 24 hours then twice daily x 8 weeks then once daily if on anticoagulation  Okay to extubate from GI standpoint. Once extubated okay for trial of liquids or tube feeds if fails extubation. Monitor serial hemoglobin and hematocrit. Transfuse to keep hemoglobin > 8 g/dL (cardiac history) or as clinically indicated  5. Ulcer is high risk for rebleed. Hold AC 3 days post procedure and resume with caution  6. GI will sign off    Time spent reviewing chart, seeing patient, and discussing with attending and ICU staff Around 25 minutes    This plan was formulated in collaboration with Dr. Partha Garcia  Please feel free to contact me with any questions or concerns. Thank you for allowing me to participate in the care of your patient.       Jeaneth Dietrich, APRN - CNP on 12/1/2022 at [place] [id number] Gastroenterology    Please note that this note was generated using a voice recognition dictation software. Although every effort was made to ensure the accuracy of this automated transcription, some errors in transcription may have occurred.

## 2022-12-01 NOTE — PROGRESS NOTES
Critical Care Team - Daily Progress Note      Date and time: 12/1/2022 8:05 AM  Patient's name:  Kaley Sullivan  Medical Record Number: [id number]  Patient's account/billing number: [de-identified]  Patient's YOB: 1945  Age: 68 y.o. Date of Admission: 11/29/2022 12:20 PM  Length of stay during current admission: 2      Primary Care Physician: Lo Archuleta  ICU Attending Physician: Dr. Patel Call Status: Full Code    Reason for ICU admission: No chief complaint on file. Kaley Sullivan is a 68 y.o. presented from [facility] with past medical history significant for CAD s/p CABG 10/25/2022, atrial fibrillation on Coumadin, diabetes, , HTN, HLD, depression. Presented for painless rectal bleeding multiple times with hg dropped from 8.6 to 6.8. received 2 units PRBC, 2 units FFP, and 1 unit cryoprecipitate as well as TXA, 10 mg of vitamin K.and 2000 units Kcentra for INR of 3.09. EGD 11/28/2022 and found to have a duodenal ulcer with duodenitis but no active bleeding. He had a colonoscopy with reports of no source of bleeding found. According to the chart of transfer, patient had multiple episodes of hematemesis and the repeat EGD showed old blood and no active duodenal ulcer treated with epinephrine. Patient was intebated and transfereed to [facility] for higher care    SUBJECTIVE:     OVERNIGHT EVENTS:         No significant events over night      F.A.S.T. M. H.U.G.S. B.I.D. Feeding Diet: Diet NPO  ADULT TUBE FEEDING; Orogastric; Diabetic; Continuous; 40; Yes; 10; Q 4 hours; 65; 30; Q 4 hours  Fluids: Valentinian@VHT  Family: Will update the family  Analgesic: Fent gtt  Sedation: Propofol, adding Precedex to transition   Thrombo-prophylaxis: [] Enoxaparin, [] Unfract.  Heparin Subcutaneously, [x] EPC Cuffs  Mobility: nonmobile, intubated  Heads up: 30 degrees  Ulcer prophylaxis: [] PPI Agent, [] V3Xymeg, [] Sucralfate, [] Other: PROTONIX GTT  Glycemic control: SSI, lantus 15 units nightly  Spontaneous breathing trial: pending   Bowel regimen/urine output: 3510 ML/2620 ml  Indwelling catheter/lines: CVC (), PIVx2, felix ()  De-escalation: Extubation, change IV infusion to IV BID       AWAKE & FOLLOWING COMMANDS:  [] No   [] Yes    CURRENT VENTILATION STATUS:     [x] Ventilator  [] BIPAP  [] Nasal Cannula [] Room Air      IF INTUBATED, ET TUBE MARKING AT LOWER LIP:       cms    SECRETIONS Amount:  [x] Small [] Moderate  [] Large  [] None  Color:     [] White [] Colored  [] Bloody    SEDATION:  RAAS Score:  [x] Propofol gtt  [] Versed gtt  [] Ativan gtt   [] No Sedation    PARALYZED:  [x] No    [] Yes    DIARRHEA:                [x] No                [] Yes  (C. Difficile status: [] positive                                                                                                                       [] negative                                                                                                                     [] pending)    VASOPRESSORS:  [] No    [x] Yes    If yes -   [x] Levophed       [] Dopamine     [] Vasopressin       [] Dobutamine  [] Phenylephrine         [] Epinephrine    CENTRAL LINES:     [] No   [x] Yes   (Date of Insertion:   )           If yes -     [] Right IJ     [] Left IJ [] Right Femoral [] Left Femoral                   [] Right Subclavian [] Left Subclavian       FELIX'S CATHETER:   [] No   [x] Yes  (Date of Insertion:   )     URINE OUTPUT:            [x] Good   [] Low              [] Anuric      OBJECTIVE:     VITAL SIGNS:  BP (!) 113/52   Pulse (!) 49   Temp 98.4 °F (36.9 °C) (Bladder)   Resp 12   Ht 6' 2\" (1.88 m)   Wt 207 lb 3.7 oz (94 kg)   SpO2 98%   BMI 26.61 kg/m²   Tmax over 24 hours:  Temp (24hrs), Av.2 °F (37.3 °C), Min:98.4 °F (36.9 °C), Max:100.2 °F (37.9 °C)      Patient Vitals for the past 8 hrs:   BP Temp Temp src Pulse Resp SpO2 Weight   22 0600 (!) 113/52 98.4 °F (36.9 °C) Bladder (!) 49 12 98 % -- 12/01/22 0543 -- -- -- (!) 49 11 98 % --   12/01/22 0500 (!) 114/54 -- -- (!) 49 16 100 % --   12/01/22 0430 -- -- -- -- -- -- 207 lb 3.7 oz (94 kg)   12/01/22 0400 (!) 114/50 98.8 °F (37.1 °C) Bladder 52 16 100 % --   12/01/22 0336 -- -- -- 53 22 99 % --   12/01/22 0300 (!) 131/57 -- -- 53 13 99 % --   12/01/22 0200 (!) 132/59 99.1 °F (37.3 °C) Bladder 58 11 99 % --   12/01/22 0100 (!) 131/58 -- -- 59 16 99 % --           Intake/Output Summary (Last 24 hours) at 12/1/2022 0805  Last data filed at 12/1/2022 0500  Gross per 24 hour   Intake 3430.65 ml   Output 2470 ml   Net 960.65 ml       Date 12/01/22 0000 - 12/01/22 2359   Shift 2214-8643 9904-1231 4481-8470 24 Hour Total   INTAKE   I.V.(mL/kg) 796.8(8.5)   796.8(8.5)   NG/GT(mL/kg) 581(6.2)   581(6.2)   Shift Total(mL/kg) 1377. 8(14.7)   1377. 8(14.7)   OUTPUT   Urine(mL/kg/hr) 855(1.1)   855   Shift Total(mL/kg) 855(9.1)   855(9.1)   Weight (kg) 94 94 94 94       Wt Readings from Last 3 Encounters:   12/01/22 207 lb 3.7 oz (94 kg)     Body mass index is 26.61 kg/m². PHYSICAL EXAM:  Constitutional: intubated, sedated  EENT: PERRLA, EOMI, sclera clear, anicteric, ETT in place.   Neck: Supple, symmetrical, trachea midline, no adenopathy, thyroid symmetric, no jvd skin normal  Respiratory: intubated  Cardiovascular: regular rate and rhythm, normal S1, S2, no murmur noted and 2+ pulses throughout  Abdomen: soft, nontender, nondistended, no masses or organomegaly  NEUROLOGIC: intubated, sedated  Extremities:  peripheral pulses normal, no pedal edema, no clubbing or cyanosis  SKIN: normal coloration and turgor    Any additional physical findings:      MEDICATIONS:  Scheduled Meds:   insulin glargine  15 Units SubCUTAneous BID    sodium chloride flush  5-40 mL IntraVENous 2 times per day    insulin lispro  0-8 Units SubCUTAneous TID WC    insulin lispro  0-4 Units SubCUTAneous Nightly     Continuous Infusions:   dexmedetomidine 1.2 mcg/kg/hr (12/01/22 0640) sodium chloride      sodium chloride      fentaNYL 50 mcg/mL 75 mcg/hr (12/01/22 0412)    pantoprazole 8 mg/hr (12/01/22 0400)    norepinephrine Stopped (11/30/22 1916)    propofol 20 mcg/kg/min (12/01/22 0646)    sodium chloride 50 mL/hr at 12/01/22 0400    dextrose       PRN Meds:   sodium chloride, , PRN  sodium chloride flush, 5-40 mL, PRN  sodium chloride, , PRN  ondansetron, 4 mg, Q8H PRN   Or  ondansetron, 4 mg, Q6H PRN  polyethylene glycol, 17 g, Daily PRN  acetaminophen, 650 mg, Q6H PRN   Or  acetaminophen, 650 mg, Q6H PRN  sodium phosphate IVPB, 10 mmol, PRN   Or  sodium phosphate IVPB, 15 mmol, PRN   Or  sodium phosphate IVPB, 20 mmol, PRN  magnesium sulfate, 2,000 mg, PRN  potassium chloride, 20 mEq, PRN   Or  potassium chloride, 10 mEq, PRN  glucose, 4 tablet, PRN  dextrose bolus, 125 mL, PRN   Or  dextrose bolus, 250 mL, PRN  glucagon (rDNA), 1 mg, PRN  dextrose, , Continuous PRN      SUPPORT DEVICES: [] Ventilator [] BIPAP  [] Nasal Cannula [] Room Air    VENT SETTINGS (Comprehensive) (if applicable):  Vent Information  Equipment Changed: HME  Ventilator Initiate: Yes  Vent Mode: AC/PRVC  Additional Respiratory Assessments  Heart Rate: (!) 49  Resp: 12  SpO2: 98 %  End Tidal CO2: 23 (%)  Position: Semi-Hardy's  Humidification Source: HME  Cuff Pressure (cm H2O):  (mov)    ABGs:   Lab Results   Component Value Date/Time    FIO2 30.0 11/30/2022 05:04 AM     Lactic Acid: No results found for: LACTA      DATA:  Complete Blood Count:   Recent Labs     11/29/22  1406 11/29/22  1800 11/30/22  0431 11/30/22  1042 11/30/22  1656 12/01/22  0008 12/01/22  0614   WBC 15.9*  --  11.4*  --   --   --  8.5   HGB 7.5*   < > 7.5*   < > 8.7* 8.5* 8.5*   MCV 93.1  --  92.9  --   --   --  91.7     --  122*  --   --   --  99*   RBC 2.45*  --  2.41*  --   --   --  2.88*   HCT 22.8*   < > 22.4*   < > 26.6* 25.4* 26.4*   MCH 30.6  --  31.1  --   --   --  29.5   MCHC 32.9  --  33.5  --   --   --  32.2   RDW 14.2  -- 14.6*  --   --   --  16.4*   MPV 10.2  --  9.8  --   --   --  9.7    < > = values in this interval not displayed. PT/INR:    Lab Results   Component Value Date/Time    PROTIME 13.4 11/30/2022 04:31 AM    INR 1.3 11/30/2022 04:31 AM     PTT:  No results found for: APTT, PTT    Basal Metabolic Profile:   Recent Labs     11/29/22  1406 11/30/22 0431    133*   K 4.8 4.4   BUN 35* 29*   CREATININE 1.69* 1.42*   * 108*   CO2 17* 16*        Magnesium:   Lab Results   Component Value Date/Time    MG 2.0 11/29/2022 10:52 PM     Phosphorus: No results found for: PHOS  S. Calcium:  Recent Labs     11/30/22 0431   CALCIUM 7.6*       S. Ionized Calcium:No results for input(s): IONCA in the last 72 hours. Urinalysis:   Lab Results   Component Value Date/Time    WBCUA 20 TO 50 11/29/2022 01:45 PM    RBCUA 2 TO 5 11/29/2022 01:45 PM       CARDIAC ENZYMES: No results for input(s): CKMB, CKMBINDEX, TROPONINI in the last 72 hours. Invalid input(s): CKTOTAL;3  BNP: No results for input(s): BNP in the last 72 hours. LFTS  Recent Labs     11/29/22  1406   ALKPHOS 80   ALT 16   AST 73*   BILITOT 0.3   BILIDIR 0.1   LABALBU 2.7*         AMYLASE/LIPASE/AMMONIA  Recent Labs     11/29/22  1406   LIPASE 10*         Last 3 Blood Glucose:   Recent Labs     11/29/22  1406 11/30/22 0431   GLUCOSE 365* 303*        HgBA1c:  No results found for: LABA1C      TSH:  No results found for: TSH  ANEMIA STUDIES  No results for input(s): LABIRON, TIBC, FERRITIN, [id number], FOLATE, OCCULTBLD in the last 72 hours.     Cultures during this admission:     Blood cultures:                 [] None drawn      [] Negative             []  Positive (Details:  )  Urine Culture:                   [] None drawn      [] Negative             []  Positive (Details:  )  Sputum Culture:               [] None drawn       [] Negative             []  Positive (Details:  )   Endotracheal aspirate:     [] None drawn       [] Negative []  Positive (Details:  )     Chest Xray (12/1/2022):    ASSESSMENT:     1) Hypovolemic shock 2/2 acute blood loss from UGIB  2) CAD s/p CABG 1 month ago  3) Atrial fibrillation on coumadin  4) NSTEMI  5) ERICA 2/2 hypovolemia 2/2 shock and GI bleed  6) HTN   7) HLD  8) Depression   9) Combined acute gapped metabolic acidosis with acute respiratory alkalosis    PLAN:     Neuro  - intubated, sedated on 45 mcg/kg/min of propofol and 50mcg/hr of fentany  - cont neuro checks    CV  - Still on small amount of levophed at 5mcg/min.   - HR in 60s. Maps 60-74.  - NSTEMI  - Troponins starting to down trend but remain elevated. - Cards was consulted, appreciate recommendations  - HgB was 7.5 post transfusion, will give second unit PRBC to keep HgB >8 in setting of NSTEMI. - Afib on coumadin   - Restart 3 days after the EGD as per GI  - hold home HTN meds in setting of hemodynamic instability    Resp  - On PRVC 40%/16/650/5.  - VBG this AM 7.335/35.2    GI  - UGI bleed 2/2 duodenal ulcer  -S/p EGD 11/29 --> duodenal ulcer with visible vessel cauterized with bipolar probe, old blood in the stomach.  - GI recs post EGD --> continue with PPI drip for another 24 to 48 hours and then twice daily for 8 weeks. - Diet NPO  ADULT TUBE FEEDING; Orogastric; Diabetic; Continuous; 40; Yes; 10; Q 4 hours; 65; 30; Q 4 hours  - cont protonix    /Renal  - Na 133 (137) / K 4.4 (4.8)  - BUN 29 (35) / Cr 1.42 (1.69)  - UOP: 1.17L since arrival. 0.7cc/kg/hr. Heme  - HgB 8.5  (6.9, 7.0) / Hc 22.4 ( 21.5) -- s/p 1 unit PRBC overnight.   -Monitor H & H  -Transfuse if HB< 8.0    ID  - Remains afebrile.  Tmax 98.6.   - WBC 8.5 (15.9)     Endo  - Glucose running on the higher side, currently on lantus 15 units and also on sliding scale    DVT Ppx: SCDs, no AC until cleared by GI  GI Ppx: Protonix gtt  Diet: Tube feeds    Marybeth Horowitz MD           Department of Internal Medicine/ Critical care  [facility] ([facility] [address] [facility]) 12/1/2022, 8:05 AM

## 2022-12-01 NOTE — PROGRESS NOTES
[facility]up[facility] [address]  Occupational Therapy Not Seen Note    DATE: 2022    NAME: David Solorzano  MRN: [id number]   : 1945      Patient not seen this date for Occupational Therapy due to:    Patient is not appropriate for active participation in OT evaluation/treatment at this time d/t intubated, possible extubation.      Next Scheduled Treatment: Check back 2022     Electronically signed by Aditi Stevens OT on 2022 at 5:52 PM

## 2022-12-02 ENCOUNTER — APPOINTMENT (OUTPATIENT)
Dept: GENERAL RADIOLOGY | Age: 77
DRG: 377 | End: 2022-12-02
Attending: INTERNAL MEDICINE
Payer: MEDICARE

## 2022-12-02 ENCOUNTER — APPOINTMENT (OUTPATIENT)
Dept: CT IMAGING | Age: 77
DRG: 377 | End: 2022-12-02
Attending: INTERNAL MEDICINE
Payer: MEDICARE

## 2022-12-02 LAB
ABSOLUTE EOS #: 0.12 K/UL (ref 0–0.44)
ABSOLUTE IMMATURE GRANULOCYTE: 0.04 K/UL (ref 0–0.3)
ABSOLUTE LYMPH #: 1 K/UL (ref 1.1–3.7)
ABSOLUTE MONO #: 0.6 K/UL (ref 0.1–1.2)
ANION GAP SERPL CALCULATED.3IONS-SCNC: 10 MMOL/L (ref 9–17)
BASOPHILS # BLD: 0 % (ref 0–2)
BASOPHILS ABSOLUTE: <0.03 K/UL (ref 0–0.2)
BUN BLDV-MCNC: 17 MG/DL (ref 8–23)
CALCIUM SERPL-MCNC: 7.7 MG/DL (ref 8.6–10.4)
CHLORIDE BLD-SCNC: 113 MMOL/L (ref 98–107)
CO2: 17 MMOL/L (ref 20–31)
CREAT SERPL-MCNC: 1.02 MG/DL (ref 0.7–1.2)
EKG ATRIAL RATE: 46 BPM
EKG P AXIS: 36 DEGREES
EKG P-R INTERVAL: 180 MS
EKG Q-T INTERVAL: 568 MS
EKG QRS DURATION: 160 MS
EKG QTC CALCULATION (BAZETT): 497 MS
EKG R AXIS: 45 DEGREES
EKG T AXIS: 132 DEGREES
EKG VENTRICULAR RATE: 46 BPM
EOSINOPHILS RELATIVE PERCENT: 2 % (ref 1–4)
GFR SERPL CREATININE-BSD FRML MDRD: >60 ML/MIN/1.73M2
GLUCOSE BLD-MCNC: 113 MG/DL (ref 75–110)
GLUCOSE BLD-MCNC: 172 MG/DL (ref 70–99)
GLUCOSE BLD-MCNC: 177 MG/DL (ref 75–110)
GLUCOSE BLD-MCNC: 223 MG/DL (ref 75–110)
HCT VFR BLD CALC: 23.9 % (ref 40.7–50.3)
HCT VFR BLD CALC: 26.3 % (ref 40.7–50.3)
HCT VFR BLD CALC: 28 % (ref 40.7–50.3)
HCT VFR BLD CALC: 29 % (ref 40.7–50.3)
HEMOGLOBIN: 8.2 G/DL (ref 13–17)
HEMOGLOBIN: 8.3 G/DL (ref 13–17)
HEMOGLOBIN: 8.8 G/DL (ref 13–17)
HEMOGLOBIN: 9.2 G/DL (ref 13–17)
IMMATURE GRANULOCYTES: 1 %
LACTIC ACID, WHOLE BLOOD: 0.9 MMOL/L (ref 0.7–2.1)
LACTIC ACID, WHOLE BLOOD: 1 MMOL/L (ref 0.7–2.1)
LYMPHOCYTES # BLD: 13 % (ref 24–43)
MCH RBC QN AUTO: 29.4 PG (ref 25.2–33.5)
MCHC RBC AUTO-ENTMCNC: 31.6 G/DL (ref 28.4–34.8)
MCV RBC AUTO: 93.3 FL (ref 82.6–102.9)
MONOCYTES # BLD: 8 % (ref 3–12)
NRBC AUTOMATED: 0 PER 100 WBC
PDW BLD-RTO: 16.8 % (ref 11.8–14.4)
PLATELET # BLD: 120 K/UL (ref 138–453)
PMV BLD AUTO: 10 FL (ref 8.1–13.5)
POTASSIUM SERPL-SCNC: 4 MMOL/L (ref 3.7–5.3)
RBC # BLD: 2.82 M/UL (ref 4.21–5.77)
RBC # BLD: ABNORMAL 10*6/UL
SEG NEUTROPHILS: 76 % (ref 36–65)
SEGMENTED NEUTROPHILS ABSOLUTE COUNT: 5.71 K/UL (ref 1.5–8.1)
SODIUM BLD-SCNC: 140 MMOL/L (ref 135–144)
TROPONIN, HIGH SENSITIVITY: 490 NG/L (ref 0–22)
TROPONIN, HIGH SENSITIVITY: 544 NG/L (ref 0–22)
TROPONIN, HIGH SENSITIVITY: 596 NG/L (ref 0–22)
TROPONIN, HIGH SENSITIVITY: 600 NG/L (ref 0–22)
WBC # BLD: 7.5 K/UL (ref 3.5–11.3)

## 2022-12-02 PROCEDURE — 85018 HEMOGLOBIN: CPT

## 2022-12-02 PROCEDURE — 71045 X-RAY EXAM CHEST 1 VIEW: CPT

## 2022-12-02 PROCEDURE — 85014 HEMATOCRIT: CPT

## 2022-12-02 PROCEDURE — 84484 ASSAY OF TROPONIN QUANT: CPT

## 2022-12-02 PROCEDURE — C9113 INJ PANTOPRAZOLE SODIUM, VIA: HCPCS | Performed by: STUDENT IN AN ORGANIZED HEALTH CARE EDUCATION/TRAINING PROGRAM

## 2022-12-02 PROCEDURE — 6370000000 HC RX 637 (ALT 250 FOR IP): Performed by: STUDENT IN AN ORGANIZED HEALTH CARE EDUCATION/TRAINING PROGRAM

## 2022-12-02 PROCEDURE — 31500 INSERT EMERGENCY AIRWAY: CPT

## 2022-12-02 PROCEDURE — 2500000003 HC RX 250 WO HCPCS: Performed by: HEALTH CARE PROVIDER

## 2022-12-02 PROCEDURE — 83605 ASSAY OF LACTIC ACID: CPT

## 2022-12-02 PROCEDURE — 2500000003 HC RX 250 WO HCPCS: Performed by: STUDENT IN AN ORGANIZED HEALTH CARE EDUCATION/TRAINING PROGRAM

## 2022-12-02 PROCEDURE — 2580000003 HC RX 258: Performed by: STUDENT IN AN ORGANIZED HEALTH CARE EDUCATION/TRAINING PROGRAM

## 2022-12-02 PROCEDURE — 70450 CT HEAD/BRAIN W/O DYE: CPT

## 2022-12-02 PROCEDURE — 36415 COLL VENOUS BLD VENIPUNCTURE: CPT

## 2022-12-02 PROCEDURE — 6360000002 HC RX W HCPCS: Performed by: HEALTH CARE PROVIDER

## 2022-12-02 PROCEDURE — 2700000000 HC OXYGEN THERAPY PER DAY

## 2022-12-02 PROCEDURE — 31720 CLEARANCE OF AIRWAYS: CPT

## 2022-12-02 PROCEDURE — 94761 N-INVAS EAR/PLS OXIMETRY MLT: CPT

## 2022-12-02 PROCEDURE — 94003 VENT MGMT INPAT SUBQ DAY: CPT

## 2022-12-02 PROCEDURE — 85025 COMPLETE CBC W/AUTO DIFF WBC: CPT

## 2022-12-02 PROCEDURE — 80048 BASIC METABOLIC PNL TOTAL CA: CPT

## 2022-12-02 PROCEDURE — 82947 ASSAY GLUCOSE BLOOD QUANT: CPT

## 2022-12-02 PROCEDURE — 99291 CRITICAL CARE FIRST HOUR: CPT | Performed by: INTERNAL MEDICINE

## 2022-12-02 PROCEDURE — 0BH17EZ INSERTION OF ENDOTRACHEAL AIRWAY INTO TRACHEA, VIA NATURAL OR ARTIFICIAL OPENING: ICD-10-PCS | Performed by: INTERNAL MEDICINE

## 2022-12-02 PROCEDURE — 2000000000 HC ICU R&B

## 2022-12-02 PROCEDURE — 74018 RADEX ABDOMEN 1 VIEW: CPT

## 2022-12-02 PROCEDURE — 6360000002 HC RX W HCPCS: Performed by: STUDENT IN AN ORGANIZED HEALTH CARE EDUCATION/TRAINING PROGRAM

## 2022-12-02 PROCEDURE — 2580000003 HC RX 258: Performed by: HEALTH CARE PROVIDER

## 2022-12-02 RX ORDER — SUCCINYLCHOLINE CHLORIDE 20 MG/ML
100 INJECTION INTRAMUSCULAR; INTRAVENOUS ONCE
Status: COMPLETED | OUTPATIENT
Start: 2022-12-02 | End: 2022-12-02

## 2022-12-02 RX ORDER — ETOMIDATE 2 MG/ML
20 INJECTION INTRAVENOUS ONCE
Status: COMPLETED | OUTPATIENT
Start: 2022-12-02 | End: 2022-12-02

## 2022-12-02 RX ORDER — FENTANYL CITRATE 50 UG/ML
50 INJECTION, SOLUTION INTRAMUSCULAR; INTRAVENOUS ONCE
Status: COMPLETED | OUTPATIENT
Start: 2022-12-02 | End: 2022-12-02

## 2022-12-02 RX ORDER — PROPOFOL 10 MG/ML
5-50 INJECTION, EMULSION INTRAVENOUS CONTINUOUS
Status: DISCONTINUED | OUTPATIENT
Start: 2022-12-02 | End: 2022-12-05

## 2022-12-02 RX ADMIN — SUCCINYLCHOLINE CHLORIDE 100 MG: 20 INJECTION, SOLUTION INTRAMUSCULAR; INTRAVENOUS at 16:05

## 2022-12-02 RX ADMIN — FENTANYL CITRATE 50 MCG: 50 INJECTION, SOLUTION INTRAMUSCULAR; INTRAVENOUS at 13:43

## 2022-12-02 RX ADMIN — Medication 1500 MG: at 23:26

## 2022-12-02 RX ADMIN — PROPOFOL 5 MCG/KG/MIN: 10 INJECTION, EMULSION INTRAVENOUS at 17:12

## 2022-12-02 RX ADMIN — SODIUM CHLORIDE, PRESERVATIVE FREE 5 ML: 5 INJECTION INTRAVENOUS at 09:41

## 2022-12-02 RX ADMIN — INSULIN GLARGINE 15 UNITS: 100 INJECTION, SOLUTION SUBCUTANEOUS at 21:41

## 2022-12-02 RX ADMIN — SODIUM CHLORIDE, PRESERVATIVE FREE 40 MG: 5 INJECTION INTRAVENOUS at 04:53

## 2022-12-02 RX ADMIN — DEXMEDETOMIDINE HYDROCHLORIDE 1.1 MCG/KG/HR: 4 INJECTION, SOLUTION INTRAVENOUS at 06:39

## 2022-12-02 RX ADMIN — POLYETHYLENE GLYCOL 3350 17 G: 17 POWDER, FOR SOLUTION ORAL at 21:42

## 2022-12-02 RX ADMIN — DEXMEDETOMIDINE HYDROCHLORIDE 1.5 MCG/KG/HR: 4 INJECTION, SOLUTION INTRAVENOUS at 00:15

## 2022-12-02 RX ADMIN — SODIUM CHLORIDE: 9 INJECTION, SOLUTION INTRAVENOUS at 02:18

## 2022-12-02 RX ADMIN — ETOMIDATE INJECTION 20 MG: 2 SOLUTION INTRAVENOUS at 16:05

## 2022-12-02 RX ADMIN — SODIUM CHLORIDE: 9 INJECTION, SOLUTION INTRAVENOUS at 23:05

## 2022-12-02 RX ADMIN — LORAZEPAM 1 MG: 2 INJECTION INTRAMUSCULAR; INTRAVENOUS at 09:52

## 2022-12-02 RX ADMIN — SODIUM CHLORIDE, PRESERVATIVE FREE 40 MG: 5 INJECTION INTRAVENOUS at 19:30

## 2022-12-02 RX ADMIN — DEXMEDETOMIDINE HYDROCHLORIDE 1.5 MCG/KG/HR: 4 INJECTION, SOLUTION INTRAVENOUS at 03:25

## 2022-12-02 RX ADMIN — PIPERACILLIN AND TAZOBACTAM 3375 MG: 3; .375 INJECTION, POWDER, FOR SOLUTION INTRAVENOUS at 23:30

## 2022-12-02 RX ADMIN — OLANZAPINE 5 MG: 10 INJECTION, POWDER, FOR SOLUTION INTRAMUSCULAR at 11:35

## 2022-12-02 RX ADMIN — DEXMEDETOMIDINE HYDROCHLORIDE 1 MCG/KG/HR: 4 INJECTION, SOLUTION INTRAVENOUS at 10:09

## 2022-12-02 RX ADMIN — ACETAMINOPHEN 650 MG: 325 TABLET ORAL at 21:42

## 2022-12-02 RX ADMIN — SODIUM CHLORIDE, PRESERVATIVE FREE 10 ML: 5 INJECTION INTRAVENOUS at 21:42

## 2022-12-02 RX ADMIN — DEXMEDETOMIDINE HYDROCHLORIDE 1.5 MCG/KG/HR: 4 INJECTION, SOLUTION INTRAVENOUS at 13:33

## 2022-12-02 RX ADMIN — PROPOFOL 40 MCG/KG/MIN: 10 INJECTION, EMULSION INTRAVENOUS at 23:43

## 2022-12-02 ASSESSMENT — PULMONARY FUNCTION TESTS
PIF_VALUE: 18
PIF_VALUE: 26
PIF_VALUE: 17
PIF_VALUE: 23
PIF_VALUE: 18
PIF_VALUE: 14

## 2022-12-02 NOTE — PROGRESS NOTES
Comprehensive Nutrition Assessment    Type and Reason for Visit:  Reassess    Nutrition Recommendations/Plan:   Start oral diet vs restart TF as able; If TF, suggest Diabetic formula (Glucerna 1.2) goal 75 mL/hr (2160 kcal,108 g pro/day). Will continue to follow/monitor care plans. Malnutrition Assessment:  Malnutrition Status:  Insufficient data (11/30/22 1453)     Nutrition Assessment:    Chart reviewed. Pt was extubated yesterday, TF stopped. Pt remains NPO at this time. No NGT. Meds/labs reviewed. Nutrition Related Findings:    meds/labs reviewed Wound Type: None       Current Nutrition Intake & Therapies:    Diet NPO  ADULT TUBE FEEDING; Orogastric; Diabetic; Continuous; 40; Yes; 10; Q 4 hours; 65; 30; Q 4 hours  Current Tube Feeding (TF) Orders:  Feeding Route: Orogastric  Formula: Diabetic (Glucerna 1.2)  Schedule: Continuous  Feeding Regimen: 65 mL/hr -- off with extubation  Current TF & Flush Orders Provides: 0  Goal TF & Flush Orders Provides: Diabetic 1.2 Formula goal 75 mL/hr =2160 kcal and 108 g pro/day    Additional Calorie Sources:  none    Anthropometric Measures:  Height: 6' 2\" (188 cm)  Ideal Body Weight (IBW): 190 lbs (86 kg)    Admission Body Weight: 207 lb 0.2 oz (93.9 kg)  Current Body Weight: 200 lb 2.8 oz (90.8 kg),   IBW. Weight Source: Bed Scale  Current BMI (kg/m2): 25.7                          BMI Categories: Overweight (BMI 25.0-29. 9)    Estimated Daily Nutrient Needs:  Energy Requirements Based On: Kcal/kg  Weight Used for Energy Requirements: Current  Energy (kcal/day): 2200 kcal/day  Weight Used for Protein Requirements: Ideal  Protein (g/day): 105-130 g pro/day  Method Used for Fluid Requirements: Other (Comment)  Fluid (ml/day): per MD    Nutrition Diagnosis:   Inadequate oral intake related to recent extubation, current mentation/medical condition as evidenced by NPO or clear liquid status due to medical condition    Nutrition Interventions:   Food and/or Nutrient Delivery: Start oral diet vs restart TF as able; If TF, suggest Diabetic formula (Glucerna 1.2) goal 75 mL/hr (2160 kcal,108 g pro/day)  Nutrition Education/Counseling: No recommendation at this time  Coordination of Nutrition Care: Continue to monitor while inpatient  Plan of Care discussed with: RN    Goals:  Previous Goal Met: Progress towards Goal(s) Declining  Goals: Meet at least 75% of estimated needs, within 7 days       Nutrition Monitoring and Evaluation:   Behavioral-Environmental Outcomes: None Identified  Food/Nutrient Intake Outcomes: Diet Advancement/Tolerance  Physical Signs/Symptoms Outcomes: Biochemical Data, Chewing or Swallowing, GI Status, Fluid Status or Edema, Nutrition Focused Physical Findings, Skin, Weight    Discharge Planning:     Too soon to determine     [address], LD  Contact: [phone]

## 2022-12-02 NOTE — PROGRESS NOTES
[facility]   Progress Note                   Date:   12/2/2022  Patient name: Radha Bang  Date of admission:  11/29/2022 12:20 PM  MRN:   [id number]  YOB: 1945  PCP: Saloni Brock    Reason for Admission:     Subjective:       Patient seen and examined. No acute event overnight  Extubated . Fentanyl and propofol off . ON PRECEDEX. Not following command at the moment ,  Bradycardia resolved ,hemodynamically stable off pressor   Post EGD with visible bleeding duodenal ulcer which was cauterized with bipolar. Hemoglobin 8.5>8>8. 3   Trop downtrending in 500s . Were in 1k . No AC foir 3 days post egd  11/29/2022 . AC FROM TOMORROW     Medications:   Scheduled Meds:   pantoprazole (PROTONIX) 40 mg injection  40 mg IntraVENous Q12H    insulin glargine  15 Units SubCUTAneous BID    sodium chloride flush  5-40 mL IntraVENous 2 times per day    insulin lispro  0-8 Units SubCUTAneous TID WC    insulin lispro  0-4 Units SubCUTAneous Nightly     Continuous Infusions:   dexmedetomidine 1.1 mcg/kg/hr (12/02/22 0639)    sodium chloride      sodium chloride      fentaNYL 50 mcg/mL Stopped (12/01/22 1309)    norepinephrine Stopped (11/30/22 1916)    sodium chloride 50 mL/hr at 12/02/22 0400    dextrose       CBC:   Recent Labs     11/30/22  0431 11/30/22  1042 12/01/22  0614 12/01/22  1603 12/02/22  0054 12/02/22  0542   WBC 11.4*  --  8.5  --   --  7.5   HGB 7.5*   < > 8.5* 8.0* 8.2* 8.3*   *  --  99*  --   --  120*    < > = values in this interval not displayed. BMP:    Recent Labs     11/30/22  0431 12/01/22  0810 12/02/22  0542   * 136 140   K 4.4 3.9 4.0   * 106 113*   CO2 16* 19* 17*   BUN 29* 20 17   CREATININE 1.42* 1.15 1.02   GLUCOSE 303* 332* 172*       Hepatic:   Recent Labs     11/29/22  1406   AST 73*   ALT 16   BILITOT 0.3   ALKPHOS 80       Troponin: No results for input(s): TROPONINI in the last 72 hours.   BNP: No results for input(s): BNP in the last 72 hours. Lipids: No results for input(s): CHOL, HDL in the last 72 hours. Invalid input(s): LDLCALCU  INR:   Recent Labs     11/30/22  0431   INR 1.3         Objective:   Vitals: BP (!) 104/45   Pulse 56   Temp 96.8 °F (36 °C) (Bladder)   Resp 19   Ht 6' 2\" (1.88 m)   Wt 200 lb 2.8 oz (90.8 kg)   SpO2 90%   BMI 25.70 kg/m²   General appearance: Intubated and sedated. HEENT: Head: Normocephalic, no lesions, without obvious abnormality. Neck: no adenopathy, no carotid bruit, no JVD, supple, symmetrical, trachea midline and thyroid not enlarged, symmetric, no tenderness/mass/nodules  Lungs: clear to auscultation bilaterally  Heart: regular rate and rhythm, S1, S2 normal, no murmur, click, rub or gallop  Abdomen: soft, non-tender; bowel sounds normal; no masses,  no organomegaly  Extremities: extremities normal, atraumatic, no cyanosis or edema      DATA:    Diagnostics:       ECHO:   04/2018  The left ventricle is normal size. There is normal left ventricular wall thickness. Left ventricle systolic function is normal.      The Ejection Fraction is 55-60%. Grade I diastolic dysfunction. Cannot rule out anteroapical hypokinesis seen only in apical 4 chamber views although endocardium   was not sen well. Echo 04/2022    Left Ventricle: Left ventricle appears normal in size. Wall thickness   is normal. Systolic function is low normal to mildly decreased with an   ejection fraction of 50-55%. Left Atrium: Left atrium is normal in size. The left atrial volume   index is 27.1 mL/m2. Right Ventricle: Right ventricular size appears normal. The right   ventricular basal diameter is 35.0 mm. Systolic function is normal.     Mitral Valve: The leaflets are mildly thickened and exhibit normal   excursion. Aortic Valve: The aortic valve is congenitally bicuspid. The leaflets   exhibit normal excursion. There is moderate sclerosis. There is no   regurgitation.  There is mild stenosis. The calculated aortic valve area is   1.69 cm2. The calculated aortic valve peak gradient is 11.83 mmHg. The   calculated aortic valve mean gradient is 6.00 mmHg. IMPRESSION:    Shock likely hypovolemic secondary to bleeding duodenal ulcer, currently on pressor support. Status post EGD 11/29/2022 with bipolar cautery of bleeding duodenal ulcer. CAD status post PCI X 3 in 2012 and CABG X2 with LIMA to distal LAD, SVG to OM1 with exploration of distal PDA 10/25/2022. A. fib which was newly diagnosed in November 2022, on Coumadin and amiodarone. Preserved LVEF on echocardiogram as above. Elevated troponin 910 with mild uptrend. Likely secondary to bleed. Acute hypoxic respiratory failure secondary to above. Bicuspid Aortic valve. Hypertension. Hyperlipidemia. Chronic LBBB. RECOMMENDATIONS:  Patient is out of hypovolemic shock . Waiting for GI to clear for [facility] . Appreciate GI recommendation regarding anticoagulation. Wait for 3 days post EGD. likely from tomorrow . Hb stable   Will continue to follow. Supportive care for now. Sarkis Marquez MD  Internal Medicine Resident, PGY3  [facility], [facility]  12/2/2022,6:44 AM      Attending Cardiologist Addendum: I have reviewed and performed the history, physical, subjective, objective, assessment, and plan with the student/resident/fellow/APN and agree with the note. I performed the history and physical personally. I have made changes to the note above as needed. No AP or AC yet per GI- needs 3 days post EGD- ? Tomorrow  He is currently in NSR, had very short AF RVR, but was rosina yesterday, so will monitor for now  Consider oral amio tomorrow to help maintain NSR  D/w family maybe candidate for watchman device as outpatient with [facility] cardiology. Thank you for allowing me to participate in the care of this patient, please do not hesitate to call if you have any questions.     Thien Muro, , Jesenia Banks, [address], [place] [address] [facility]  [facility]. [facility]  [phone]

## 2022-12-02 NOTE — PROGRESS NOTES
Critical Care Team - Daily Progress Note      Date and time: 12/2/2022 11:33 AM  Patient's name:  Raad Nolan  Medical Record Number: [id number]  Patient's account/billing number: [de-identified]  Patient's YOB: 1945  Age: 68 y.o. Date of Admission: 11/29/2022 12:20 PM  Length of stay during current admission: 3      Primary Care Physician: Devorah Barthel  ICU Attending Physician: Dr. Elvis Aquino Status: Full Code    Reason for ICU admission: No chief complaint on file. Raad Nolan is a 68 y.o. presented from [facility] with past medical history significant for CAD s/p CABG 10/25/2022, atrial fibrillation on Coumadin, diabetes, , HTN, HLD, depression. Presented for painless rectal bleeding multiple times with hg dropped from 8.6 to 6.8. received 2 units PRBC, 2 units FFP, and 1 unit cryoprecipitate as well as TXA, 10 mg of vitamin K.and 2000 units Kcentra for INR of 3.09. EGD 11/28/2022 and found to have a duodenal ulcer with duodenitis but no active bleeding. He had a colonoscopy with reports of no source of bleeding found. According to the chart of transfer, patient had multiple episodes of hematemesis and the repeat EGD showed old blood and no active duodenal ulcer treated with epinephrine. Patient was intebated and transfereed to [address] [place] [address] for [facility] care    SUBJECTIVE:     OVERNIGHT EVENTS:         Patient was agitated over night and required Zyprexa over night     Today:  Patient is still agitated and got another dose of Zyprexa      F.A.S.T. M. H.U.G.S. B.I.D. Feeding Diet: Diet NPO  ADULT TUBE FEEDING; Orogastric; Diabetic; Continuous; 40; Yes; 10; Q 4 hours; 65; 30; Q 4 hours  Fluids: [email or web address]  Family: Will update the family  Analgesic: Tylenol as needed  Sedation: Precedex  Thrombo-prophylaxis: [] Enoxaparin, [] Unfract.  Heparin Subcutaneously, [x] EPC Cuffs  Mobility: nonmobile, intubated  Heads up: 30 degrees  Ulcer prophylaxis: [x] PPI Agent, [] I1Ooedt, [] Sucralfate, [] Other:   Glycemic control: SSI, lantus 15 units nightly  Spontaneous breathing trial: pending   Bowel regimen/urine output: 2698 ML/1335ml  Indwelling catheter/lines: CVC (), PIVx2, felix ()  De-escalation: Extubation, change IV infusion to IV BID       AWAKE & FOLLOWING COMMANDS:  [] No   [] Yes    CURRENT VENTILATION STATUS:     [] Ventilator  [] BIPAP  [x] Nasal Cannula [] Room Air      IF INTUBATED, ET TUBE MARKING AT LOWER LIP:       cms    SECRETIONS Amount:  [] Small [] Moderate  [] Large  [x] None  Color:     [] White [] Colored  [] Bloody    SEDATION:  RAAS Score:  [] Propofol gtt  [] Versed gtt  [] Ativan gtt   [x] No Sedation    PARALYZED:  [x] No    [] Yes    DIARRHEA:                [x] No                [] Yes  (C. Difficile status: [] positive                                                                                                                       [] negative                                                                                                                     [] pending)    VASOPRESSORS:  [x] No    [] Yes    If yes -   [] Levophed       [] Dopamine     [] Vasopressin       [] Dobutamine  [] Phenylephrine         [] Epinephrine    CENTRAL LINES:     [] No   [x] Yes   (Date of Insertion:   2022 )           If yes -     [] Right IJ     [] Left IJ [] Right Femoral [] Left Femoral                   [] Right Subclavian [] Left Subclavian       FELIX'S CATHETER:   [] No   [x] Yes  (Date of Insertion:   )     URINE OUTPUT:            [x] Good   [] Low              [] Anuric      OBJECTIVE:     VITAL SIGNS:  /66   Pulse 69   Temp 98.4 °F (36.9 °C) (Bladder)   Resp 18   Ht 6' 2\" (1.88 m)   Wt 200 lb 2.8 oz (90.8 kg)   SpO2 100%   BMI 25.70 kg/m²   Tmax over 24 hours:  Temp (24hrs), Av °F (37.2 °C), Min:96.8 °F (36 °C), Max:100.6 °F (38.1 °C)      Patient Vitals for the past 8 hrs:   BP Temp Temp src Pulse Resp SpO2 Weight   22 1100 135/66 -- -- 69 18 100 % --   12/02/22 1000 (!) 129/53 -- -- 57 21 98 % --   12/02/22 0900 (!) 128/54 -- -- 57 20 93 % --   12/02/22 0830 (!) 125/53 -- -- 58 20 (!) 84 % --   12/02/22 0800 (!) 126/58 98.4 °F (36.9 °C) Bladder 61 21 100 % --   12/02/22 0730 (!) 141/58 -- -- 58 20 98 % --   12/02/22 0700 (!) 120/104 -- -- 61 22 96 % --   12/02/22 0600 (!) 104/45 96.8 °F (36 °C) Bladder 56 19 90 % --   12/02/22 0500 (!) 122/53 -- -- 55 17 93 % --   12/02/22 0423 -- -- -- -- -- -- 200 lb 2.8 oz (90.8 kg)   12/02/22 0400 (!) 121/57 97.3 °F (36.3 °C) Bladder 59 19 94 % --           Intake/Output Summary (Last 24 hours) at 12/2/2022 1133  Last data filed at 12/2/2022 1112  Gross per 24 hour   Intake 2385.19 ml   Output 1945 ml   Net 440.19 ml       Date 12/02/22 0000 - 12/02/22 2359   Shift 5239-5654 7579-8283 6222-5593 24 Hour Total   INTAKE   I.V.(mL/kg) 679.7(7.5)   679.7(7.5)   Shift Total(mL/kg) 679.7(7.5)   679.7(7.5)   OUTPUT   Urine(mL/kg/hr) 455(0.6) 425  880   Shift Total(mL/kg) 455(5) 425(4.7)  880(9.7)   Weight (kg) 90.8 90.8 90.8 90.8       Wt Readings from Last 3 Encounters:   12/02/22 200 lb 2.8 oz (90.8 kg)     Body mass index is 25.7 kg/m².         PHYSICAL EXAM:  Constitutional: Patient is oxygen through the nasal cannula and very agitated  EENT: PERRLA  Neck: Supple, symmetrical, trachea midline,   Respiratory: Bilateral normal vesicular breathing  Cardiovascular: regular rate and rhythm, normal S1, S2, no murmur noted and 2+ pulses throughout  Abdomen: soft, nontender, nondistended, no masses or organomegaly  NEUROLOGIC: Patient is agitated and follow commands intermittently  Extremities:  peripheral pulses normal, no pedal edema, no clubbing or cyanosis  SKIN: normal coloration and turgor    Any additional physical findings:      MEDICATIONS:  Scheduled Meds:   OLANZapine (ZyPREXA) in sterile water IntraMUSCular  5 mg IntraMUSCular Once    pantoprazole (PROTONIX) 40 mg injection  40 mg IntraVENous Q12H    insulin glargine  15 Units SubCUTAneous BID    sodium chloride flush  5-40 mL IntraVENous 2 times per day    insulin lispro  0-8 Units SubCUTAneous TID WC    insulin lispro  0-4 Units SubCUTAneous Nightly     Continuous Infusions:   dexmedetomidine 1 mcg/kg/hr (12/02/22 1009)    sodium chloride      sodium chloride      fentaNYL 50 mcg/mL Stopped (12/01/22 1309)    norepinephrine Stopped (11/30/22 1916)    sodium chloride 50 mL/hr at 12/02/22 0400    dextrose       PRN Meds:   LORazepam, 1 mg, Q6H PRN  sodium chloride, , PRN  sodium chloride flush, 5-40 mL, PRN  sodium chloride, , PRN  ondansetron, 4 mg, Q8H PRN   Or  ondansetron, 4 mg, Q6H PRN  polyethylene glycol, 17 g, Daily PRN  acetaminophen, 650 mg, Q6H PRN   Or  acetaminophen, 650 mg, Q6H PRN  sodium phosphate IVPB, 10 mmol, PRN   Or  sodium phosphate IVPB, 15 mmol, PRN   Or  sodium phosphate IVPB, 20 mmol, PRN  magnesium sulfate, 2,000 mg, PRN  potassium chloride, 20 mEq, PRN   Or  potassium chloride, 10 mEq, PRN  glucose, 4 tablet, PRN  dextrose bolus, 125 mL, PRN   Or  dextrose bolus, 250 mL, PRN  glucagon (rDNA), 1 mg, PRN  dextrose, , Continuous PRN      SUPPORT DEVICES: [] Ventilator [] BIPAP  [x] Nasal Cannula [] Room Air    VENT SETTINGS (Comprehensive) (if applicable):  Vent Information  Equipment Changed: HME  Ventilator Initiate: Yes  Ventilator Discontinue: Yes  Vent Mode: AC/PRVC  Additional Respiratory Assessments  Heart Rate: 69  Resp: 18  SpO2: 100 %  End Tidal CO2: 28 (%)  Position: Semi-Hardy's  Humidification Source: HME  Cuff Pressure (cm H2O):  (mov)    ABGs:   Lab Results   Component Value Date/Time    FIO2 40.0 12/01/2022 04:57 AM     Lactic Acid: No results found for: LACTA      DATA:  Complete Blood Count:   Recent Labs     11/30/22  0431 11/30/22  1042 12/01/22  0614 12/01/22  1603 12/02/22  0054 12/02/22  0542 12/02/22  0750   WBC 11.4*  --  8.5  --   --  7.5  --    HGB 7.5*   < > 8.5*   < > 8.2* 8.3* 8.8*   MCV 92.9  --  91.7  --   -- 93.3  --    *  --  99*  --   --  120*  --    RBC 2.41*  --  2.88*  --   --  2.82*  --    HCT 22.4*   < > 26.4*   < > 23.9* 26.3* 29.0*   MCH 31.1  --  29.5  --   --  29.4  --    MCHC 33.5  --  32.2  --   --  31.6  --    RDW 14.6*  --  16.4*  --   --  16.8*  --    MPV 9.8  --  9.7  --   --  10.0  --     < > = values in this interval not displayed. PT/INR:    Lab Results   Component Value Date/Time    PROTIME 13.4 11/30/2022 04:31 AM    INR 1.3 11/30/2022 04:31 AM     PTT:  No results found for: APTT, PTT    Basal Metabolic Profile:   Recent Labs     11/30/22  0431 12/01/22  0810 12/02/22  0542   * 136 140   K 4.4 3.9 4.0   BUN 29* 20 17   CREATININE 1.42* 1.15 1.02   * 106 113*   CO2 16* 19* 17*        Magnesium:   Lab Results   Component Value Date/Time    MG 2.0 11/29/2022 10:52 PM     Phosphorus: No results found for: PHOS  S. Calcium:  Recent Labs     12/02/22  0542   CALCIUM 7.7*       S. Ionized Calcium:No results for input(s): IONCA in the last 72 hours. Urinalysis:   Lab Results   Component Value Date/Time    WBCUA 20 TO 50 11/29/2022 01:45 PM    RBCUA 2 TO 5 11/29/2022 01:45 PM       CARDIAC ENZYMES: No results for input(s): CKMB, CKMBINDEX, TROPONINI in the last 72 hours. Invalid input(s): CKTOTAL;3  BNP: No results for input(s): BNP in the last 72 hours. LFTS  Recent Labs     11/29/22  1406   ALKPHOS 80   ALT 16   AST 73*   BILITOT 0.3   BILIDIR 0.1   LABALBU 2.7*         AMYLASE/LIPASE/AMMONIA  Recent Labs     11/29/22  1406   LIPASE 10*         Last 3 Blood Glucose:   Recent Labs     11/29/22  1406 11/30/22  0431 12/01/22  0810 12/02/22  0542   GLUCOSE 365* 303* 332* 172*        HgBA1c:    Lab Results   Component Value Date/Time    LABA1C 6.1 11/29/2022 02:06 PM         TSH:  No results found for: TSH  ANEMIA STUDIES  No results for input(s): LABIRON, TIBC, FERRITIN, [id number], FOLATE, OCCULTBLD in the last 72 hours.     Cultures during this admission:     Blood cultures:                 [] None drawn      [] Negative             []  Positive (Details:  )  Urine Culture:                   [] None drawn      [] Negative             []  Positive (Details:  )  Sputum Culture:               [] None drawn       [] Negative             []  Positive (Details:  )   Endotracheal aspirate:     [] None drawn       [] Negative             []  Positive (Details:  )     Chest Xray (12/2/2022):    ASSESSMENT:     1) Hypovolemic shock 2/2 acute blood loss from UGIB  2) CAD s/p CABG 1 month ago  3) Atrial fibrillation on coumadin  4) NSTEMI  5) ERICA 2/2 hypovolemia 2/2 shock and GI bleed  6) HTN   7) HLD  8) Depression   9) Combined acute gapped metabolic acidosis with acute respiratory alkalosis    PLAN:     Neuro  - Patient is agitated and follow commands intermittently  -Wean off Precedex   -Zyprexa as needed  -Cont neuro checks as per protocol    CV  -Patient intermittently goes into A.fib with RVR  -Discussed with Cardiology and they want to start the Amiodarone first if patient goes into A.fib with RVR  -Start anticoagulation once cleared by Nephrology  -Keep MAP > 65      Resp  - On Oxygen through the nasal cannula  -Keep Oxygen saturation above 92%  -Continue to monitor    GI  - UGI bleed 2/2 duodenal ulcer  -S/p EGD 11/29 --> duodenal ulcer with visible vessel cauterized with bipolar probe, old blood in the stomach.  - GI recs post EGD --> continue with PPI drip for another 24 to 48 hours and then twice daily for 8 weeks. - Diet NPO  ADULT TUBE FEEDING; Orogastric; Diabetic; Continuous; 40; Yes; 10; Q 4 hours; 65; 30; Q 4 hours  -Cont Protonix IV BID    /Renal  - Na 140 (137) / K 4.4 (4.8)  - BUN 17 (35) / Cr 1.42 (1.69)  -Good urine output    Heme  - HgB 8.8  (6.9, 7.0) / Hc 22.4 ( 21.5) -- s/p 1 unit PRBC overnight.   -Monitor H & H  -Transfuse if HB< 8.0    ID  - Remains afebrile.  Tmax 98.6.   - WBC 8.5 (15.9)     Endo  - Glucose is reasonable currently on lantus 15 units and also on sliding scale    DVT Ppx: SCDs, no AC until cleared by GI  GI Ppx: Protonix gtt  Diet: Tube feeds    Stevan Shipman MD           Department of Internal Medicine/ Critical care  [facility] ([place]             12/2/2022, 11:33 AM

## 2022-12-02 NOTE — PLAN OF CARE
activity  6. If unable to ensure safety without constant attention obtain sitter and review sitter guidelines with assigned personnel  7.  Initiate Psychosocial CNS and Spiritual Care consult, as indicated  12/1/2022 2237 by Stephen Prieto RN  Outcome: Progressing  12/1/2022 1155 by Maria A Sandy RN  Outcome: Progressing  Flowsheets (Taken 12/1/2022 0800)  Effect of thought disturbance (confusion, delirium, dementia, or psychosis) are managed with adequate functional status:   Assess for contributors to thought disturbance, including medications, impaired vision or hearing, underlying metabolic abnormalities, dehydration, psychiatric diagnoses, notify [facility] high risk fall precautions, as indicated   Provide frequent short contacts to provide reality reorientation, refocusing and direction   Decrease environmental stimuli, including noise as appropriate   Monitor and intervene to maintain adequate nutrition, hydration, elimination, sleep and activity   If unable to ensure safety without constant attention obtain sitter and review sitter guidelines with assigned personnel     Problem: Pain  Goal: Verbalizes/displays adequate comfort level or baseline comfort level  12/1/2022 2237 by Stephen Prieto RN  Outcome: Progressing  12/1/2022 1155 by Maria A Sandy RN  Outcome: Progressing     Problem: Respiratory - Adult  Goal: Achieves optimal ventilation and oxygenation  12/1/2022 2237 by Stephen Prieto RN  Outcome: Progressing  12/1/2022 1826 by Noberto Duane  Outcome: Progressing  Flowsheets (Taken 12/1/2022 1826)  Achieves optimal ventilation and oxygenation:   Assess for changes in respiratory status   Assess for changes in mentation and behavior   Position to facilitate oxygenation and minimize respiratory effort   Oxygen supplementation based on oxygen saturation or arterial blood gases   Encourage broncho-pulmonary hygiene including cough, deep breathe, incentive spirometry   Assess the need for suctioning and aspirate as needed   Respiratory therapy support as indicated  12/1/2022 1155 by Jonathan Kelly RN  Outcome: Progressing     Problem: Nutrition Deficit:  Goal: Optimize nutritional status  12/1/2022 2237 by Shayy Trimble RN  Outcome: Progressing  12/1/2022 1155 by Jonathan Kelly RN  Outcome: Progressing     Problem: Skin/Tissue Integrity  Goal: Absence of new skin breakdown  Description: 1. Monitor for areas of redness and/or skin breakdown  2. Assess vascular access sites hourly  3. Every 4-6 hours minimum:  Change oxygen saturation probe site  4. Every 4-6 hours:  If on nasal continuous positive airway pressure, respiratory therapy assess nares and determine need for appliance change or resting period.   12/1/2022 2237 by Shayy Trimble RN  Outcome: Progressing  12/1/2022 1155 by Jonathan Kelly RN  Outcome: Progressing

## 2022-12-02 NOTE — PROGRESS NOTES
Patient intubated by physicians with assistance of RN, RRT Marichuy Pollard, and RRT [facility]   Writer placed vent in room and charting settings for RRTs above.      Writer informed RRTs to confirm tube location as assisted with Physicians

## 2022-12-02 NOTE — PLAN OF CARE
Problem: Respiratory - Adult  Goal: Achieves optimal ventilation and oxygenation  12/2/2022 0944 by Noberto Duane  Outcome: Progressing  Flowsheets (Taken 12/1/2022 1826)  Achieves optimal ventilation and oxygenation:   Assess for changes in respiratory status   Assess for changes in mentation and behavior   Position to facilitate oxygenation and minimize respiratory effort   Oxygen supplementation based on oxygen saturation or arterial blood gases   Encourage broncho-pulmonary hygiene including cough, deep breathe, incentive spirometry   Assess the need for suctioning and aspirate as needed   Respiratory therapy support as indicated

## 2022-12-02 NOTE — PROCEDURES
PROCEDURE NOTE - EMERGENCY INTUBATION    PATIENT NAME: [facility]  MEDICAL RECORD NO. [id number]  DATE: 12/2/2022  ATTENDING PHYSICIAN: Anabel Ring    PREOPERATIVE DIAGNOSIS:  Acute Respiratory Failure  POSTOPERATIVE DIAGNOSIS:  Same  PROCEDURE PERFORMED:  Emergency endotracheal intubation  PERFORMING PHYSICIAN: Rosalie Hernández MD    MEDICATIONS: Etomidate 20mg, Succinylcholine 100mg    DISCUSSION:  Krishna Patton is a 68y.o.-year-old male who requires intubation and ventilatory support due to impending respiratory failure, airway compromise, and impending airway compromise. The history and physical examination were reviewed and confirmed. CONSENT: Unable to be obtained due to patient's condition. Family at bedside provided verbal consent. PROCEDURE:  A timeout was initiated by the bedside nurse and was confirmed by those present. The patient was placed in the appropriate position. Cricoid pressure was not required. Intubation was performed by video laryngoscopy a 7.5 cuffed endotracheal tube. The cuff was then inflated and the tube was secured appropriately at a distance of 25 cm to the dental ridge. Initial confirmation of placement included bilateral breath sounds, an end tidal CO2 detector, absence of sounds over the stomach, tube fogging, adequate chest rise, and adequate pulse oximetry reading. A chest x-ray to verify correct placement of the tube has been ordered but is still pending. The patient tolerated the procedure well.      COMPLICATIONS:  None     Rosalie Hernández MD  4:17 PM, 12/2/22

## 2022-12-02 NOTE — PROGRESS NOTES
Called to patients room due to him desatting. Patient was 87% on 7L simple mask. Placed pulse ox probe on patients ear and increased to 9L. Patient satting at 92% currently will continue to monitor.

## 2022-12-03 LAB
ABSOLUTE EOS #: 0.14 K/UL (ref 0–0.44)
ABSOLUTE IMMATURE GRANULOCYTE: 0.04 K/UL (ref 0–0.3)
ABSOLUTE LYMPH #: 1.13 K/UL (ref 1.1–3.7)
ABSOLUTE MONO #: 0.93 K/UL (ref 0.1–1.2)
ACTION: NORMAL
ALLEN TEST: POSITIVE
ANION GAP SERPL CALCULATED.3IONS-SCNC: 12 MMOL/L (ref 9–17)
BASOPHILS # BLD: 0 % (ref 0–2)
BASOPHILS ABSOLUTE: 0.04 K/UL (ref 0–0.2)
BUN BLDV-MCNC: 20 MG/DL (ref 8–23)
CALCIUM SERPL-MCNC: 7.5 MG/DL (ref 8.6–10.4)
CHLORIDE BLD-SCNC: 108 MMOL/L (ref 98–107)
CO2: 17 MMOL/L (ref 20–31)
CREAT SERPL-MCNC: 1.33 MG/DL (ref 0.7–1.2)
DATE AND TIME: NORMAL
EOSINOPHILS RELATIVE PERCENT: 1 % (ref 1–4)
GFR SERPL CREATININE-BSD FRML MDRD: 55 ML/MIN/1.73M2
GLUCOSE BLD-MCNC: 225 MG/DL (ref 75–110)
GLUCOSE BLD-MCNC: 233 MG/DL (ref 75–110)
GLUCOSE BLD-MCNC: 247 MG/DL (ref 75–110)
GLUCOSE BLD-MCNC: 253 MG/DL (ref 74–100)
GLUCOSE BLD-MCNC: 268 MG/DL (ref 75–110)
GLUCOSE BLD-MCNC: 271 MG/DL (ref 70–99)
HCT VFR BLD CALC: 23.7 % (ref 40.7–50.3)
HCT VFR BLD CALC: 24.4 % (ref 40.7–50.3)
HCT VFR BLD CALC: 25.6 % (ref 40.7–50.3)
HCT VFR BLD CALC: 26.9 % (ref 40.7–50.3)
HEMOGLOBIN: 7.7 G/DL (ref 13–17)
HEMOGLOBIN: 7.9 G/DL (ref 13–17)
HEMOGLOBIN: 8 G/DL (ref 13–17)
HEMOGLOBIN: 8.4 G/DL (ref 13–17)
IMMATURE GRANULOCYTES: 0 %
LYMPHOCYTES # BLD: 10 % (ref 24–43)
MCH RBC QN AUTO: 29.7 PG (ref 25.2–33.5)
MCHC RBC AUTO-ENTMCNC: 31.3 G/DL (ref 28.4–34.8)
MCV RBC AUTO: 95.2 FL (ref 82.6–102.9)
MONOCYTES # BLD: 8 % (ref 3–12)
NEGATIVE BASE EXCESS, ART: 9 (ref 0–2)
NOTIFY: NORMAL
NRBC AUTOMATED: 0 PER 100 WBC
O2 DEVICE/FLOW/%: ABNORMAL
PDW BLD-RTO: 17.5 % (ref 11.8–14.4)
PLATELET # BLD: 163 K/UL (ref 138–453)
PMV BLD AUTO: 10.1 FL (ref 8.1–13.5)
POC HCO3: 15.7 MMOL/L (ref 21–28)
POC O2 SATURATION: 77 % (ref 94–98)
POC PCO2: 26.8 MM HG (ref 35–48)
POC PH: 7.38 (ref 7.35–7.45)
POC PO2: 41.8 MM HG (ref 83–108)
POTASSIUM SERPL-SCNC: 3.8 MMOL/L (ref 3.7–5.3)
RBC # BLD: 2.69 M/UL (ref 4.21–5.77)
RBC # BLD: ABNORMAL 10*6/UL
READ BACK: YES
SAMPLE SITE: ABNORMAL
SEG NEUTROPHILS: 80 % (ref 36–65)
SEGMENTED NEUTROPHILS ABSOLUTE COUNT: 9.16 K/UL (ref 1.5–8.1)
SODIUM BLD-SCNC: 137 MMOL/L (ref 135–144)
WBC # BLD: 11.4 K/UL (ref 3.5–11.3)

## 2022-12-03 PROCEDURE — 2580000003 HC RX 258: Performed by: HEALTH CARE PROVIDER

## 2022-12-03 PROCEDURE — 85025 COMPLETE CBC W/AUTO DIFF WBC: CPT

## 2022-12-03 PROCEDURE — 6370000000 HC RX 637 (ALT 250 FOR IP): Performed by: STUDENT IN AN ORGANIZED HEALTH CARE EDUCATION/TRAINING PROGRAM

## 2022-12-03 PROCEDURE — 85018 HEMOGLOBIN: CPT

## 2022-12-03 PROCEDURE — 2580000003 HC RX 258: Performed by: STUDENT IN AN ORGANIZED HEALTH CARE EDUCATION/TRAINING PROGRAM

## 2022-12-03 PROCEDURE — 2500000003 HC RX 250 WO HCPCS: Performed by: STUDENT IN AN ORGANIZED HEALTH CARE EDUCATION/TRAINING PROGRAM

## 2022-12-03 PROCEDURE — 94003 VENT MGMT INPAT SUBQ DAY: CPT

## 2022-12-03 PROCEDURE — C9113 INJ PANTOPRAZOLE SODIUM, VIA: HCPCS | Performed by: STUDENT IN AN ORGANIZED HEALTH CARE EDUCATION/TRAINING PROGRAM

## 2022-12-03 PROCEDURE — 36415 COLL VENOUS BLD VENIPUNCTURE: CPT

## 2022-12-03 PROCEDURE — 87040 BLOOD CULTURE FOR BACTERIA: CPT

## 2022-12-03 PROCEDURE — 85014 HEMATOCRIT: CPT

## 2022-12-03 PROCEDURE — 94761 N-INVAS EAR/PLS OXIMETRY MLT: CPT

## 2022-12-03 PROCEDURE — 6360000002 HC RX W HCPCS: Performed by: STUDENT IN AN ORGANIZED HEALTH CARE EDUCATION/TRAINING PROGRAM

## 2022-12-03 PROCEDURE — 2000000000 HC ICU R&B

## 2022-12-03 PROCEDURE — 80048 BASIC METABOLIC PNL TOTAL CA: CPT

## 2022-12-03 PROCEDURE — 2500000003 HC RX 250 WO HCPCS: Performed by: HEALTH CARE PROVIDER

## 2022-12-03 PROCEDURE — 2700000000 HC OXYGEN THERAPY PER DAY

## 2022-12-03 PROCEDURE — 6360000002 HC RX W HCPCS: Performed by: HEALTH CARE PROVIDER

## 2022-12-03 PROCEDURE — 99291 CRITICAL CARE FIRST HOUR: CPT | Performed by: INTERNAL MEDICINE

## 2022-12-03 PROCEDURE — 82803 BLOOD GASES ANY COMBINATION: CPT

## 2022-12-03 PROCEDURE — 51702 INSERT TEMP BLADDER CATH: CPT

## 2022-12-03 PROCEDURE — 36600 WITHDRAWAL OF ARTERIAL BLOOD: CPT

## 2022-12-03 PROCEDURE — 82947 ASSAY GLUCOSE BLOOD QUANT: CPT

## 2022-12-03 RX ORDER — OLANZAPINE 5 MG/1
5 TABLET ORAL NIGHTLY
Status: DISCONTINUED | OUTPATIENT
Start: 2022-12-03 | End: 2022-12-06

## 2022-12-03 RX ORDER — AMIODARONE HYDROCHLORIDE 200 MG/1
200 TABLET ORAL DAILY
Status: DISCONTINUED | OUTPATIENT
Start: 2022-12-03 | End: 2022-12-09

## 2022-12-03 RX ORDER — OLANZAPINE 5 MG/1
5 TABLET ORAL 2 TIMES DAILY
Status: DISCONTINUED | OUTPATIENT
Start: 2022-12-03 | End: 2022-12-03

## 2022-12-03 RX ADMIN — PROPOFOL 35 MCG/KG/MIN: 10 INJECTION, EMULSION INTRAVENOUS at 07:55

## 2022-12-03 RX ADMIN — Medication 1500 MG: at 22:33

## 2022-12-03 RX ADMIN — OLANZAPINE 5 MG: 5 TABLET, FILM COATED ORAL at 22:32

## 2022-12-03 RX ADMIN — SODIUM CHLORIDE, PRESERVATIVE FREE 40 MG: 5 INJECTION INTRAVENOUS at 03:32

## 2022-12-03 RX ADMIN — INSULIN LISPRO 2 UNITS: 100 INJECTION, SOLUTION INTRAVENOUS; SUBCUTANEOUS at 17:26

## 2022-12-03 RX ADMIN — INSULIN LISPRO 4 UNITS: 100 INJECTION, SOLUTION INTRAVENOUS; SUBCUTANEOUS at 11:58

## 2022-12-03 RX ADMIN — ACETAMINOPHEN 650 MG: 325 TABLET ORAL at 03:31

## 2022-12-03 RX ADMIN — LORAZEPAM 1 MG: 2 INJECTION INTRAMUSCULAR; INTRAVENOUS at 03:32

## 2022-12-03 RX ADMIN — SODIUM CHLORIDE, PRESERVATIVE FREE 40 MG: 5 INJECTION INTRAVENOUS at 14:19

## 2022-12-03 RX ADMIN — DEXMEDETOMIDINE HYDROCHLORIDE 0.5 MCG/KG/HR: 4 INJECTION, SOLUTION INTRAVENOUS at 22:13

## 2022-12-03 RX ADMIN — SODIUM CHLORIDE, PRESERVATIVE FREE 10 ML: 5 INJECTION INTRAVENOUS at 20:15

## 2022-12-03 RX ADMIN — AMIODARONE HYDROCHLORIDE 200 MG: 200 TABLET ORAL at 08:33

## 2022-12-03 RX ADMIN — ACETAMINOPHEN 650 MG: 325 TABLET ORAL at 20:21

## 2022-12-03 RX ADMIN — INSULIN LISPRO 2 UNITS: 100 INJECTION, SOLUTION INTRAVENOUS; SUBCUTANEOUS at 08:16

## 2022-12-03 RX ADMIN — PROPOFOL 35 MCG/KG/MIN: 10 INJECTION, EMULSION INTRAVENOUS at 12:23

## 2022-12-03 RX ADMIN — SODIUM CHLORIDE, PRESERVATIVE FREE 10 ML: 5 INJECTION INTRAVENOUS at 08:21

## 2022-12-03 RX ADMIN — INSULIN GLARGINE 15 UNITS: 100 INJECTION, SOLUTION SUBCUTANEOUS at 08:17

## 2022-12-03 RX ADMIN — PROPOFOL 20 MCG/KG/MIN: 10 INJECTION, EMULSION INTRAVENOUS at 22:46

## 2022-12-03 RX ADMIN — Medication 2 MCG/MIN: at 21:52

## 2022-12-03 RX ADMIN — PIPERACILLIN AND TAZOBACTAM 3375 MG: 3; .375 INJECTION, POWDER, FOR SOLUTION INTRAVENOUS at 20:36

## 2022-12-03 RX ADMIN — PROPOFOL 40 MCG/KG/MIN: 10 INJECTION, EMULSION INTRAVENOUS at 03:35

## 2022-12-03 RX ADMIN — DEXMEDETOMIDINE HYDROCHLORIDE 0.2 MCG/KG/HR: 4 INJECTION, SOLUTION INTRAVENOUS at 14:18

## 2022-12-03 RX ADMIN — Medication 2 MCG/MIN: at 07:08

## 2022-12-03 RX ADMIN — PIPERACILLIN AND TAZOBACTAM 3375 MG: 3; .375 INJECTION, POWDER, FOR SOLUTION INTRAVENOUS at 12:26

## 2022-12-03 RX ADMIN — INSULIN GLARGINE 15 UNITS: 100 INJECTION, SOLUTION SUBCUTANEOUS at 20:25

## 2022-12-03 RX ADMIN — PIPERACILLIN AND TAZOBACTAM 3375 MG: 3; .375 INJECTION, POWDER, FOR SOLUTION INTRAVENOUS at 06:32

## 2022-12-03 RX ADMIN — SODIUM CHLORIDE: 9 INJECTION, SOLUTION INTRAVENOUS at 10:33

## 2022-12-03 ASSESSMENT — PULMONARY FUNCTION TESTS
PIF_VALUE: 12
PIF_VALUE: 27
PIF_VALUE: 12
PIF_VALUE: 12
PIF_VALUE: 22
PIF_VALUE: 20
PIF_VALUE: 11
PIF_VALUE: 12
PIF_VALUE: 13
PIF_VALUE: 12
PIF_VALUE: 11
PIF_VALUE: 30
PIF_VALUE: 12
PIF_VALUE: 30
PIF_VALUE: 11
PIF_VALUE: 12

## 2022-12-03 ASSESSMENT — PAIN SCALES - GENERAL: PAINLEVEL_OUTOF10: 10

## 2022-12-03 NOTE — PROGRESS NOTES
Comprehensive Nutrition Assessment    Type and Reason for Visit:  Reassess    Nutrition Recommendations/Plan:   Recommend modifying TF to Vital High Protein (peptide based high protein) goal of 60 mL/hr with current rate of Propofol. TF will provide 1440 kcals, 126 gm protein. Nutrition Assessment:    Pt required reintubation yesterday. TF was restarted. Glucerna 1.2 formula running at 65 mL/hr. Currently being sedated with Propofol. Nutrition Related Findings:    meds/labs reviewed Wound Type: None       Current Nutrition Intake & Therapies:    Average Meal Intake: NPO  Average Supplements Intake: NPO  Diet NPO  ADULT TUBE FEEDING; Orogastric; Diabetic; Continuous; 40; Yes; 10; Q 4 hours; 65; 30; Q 4 hours  Current Tube Feeding (TF) Orders:  Feeding Route: Orogastric  Formula: Diabetic (Glucerna 1.2)  Schedule: Continuous  Feeding Regimen: 65 mL/hr  Water Flushes: 30 mL q 4 hours  Current TF & Flush Orders Provides: Glucerna 1.2 at 65 mL/hr = 1872 kcals, 94 gm protein per day  Goal TF & Flush Orders Provides: Vital High Protein at 60 mL/hr = 1440 kcals, 126 gm protein  Additional Calorie Sources:  Propofol at 21.8 mL/hr = 576 kcals/day    Anthropometric Measures:  Height: 6' 2\" (188 cm)  Ideal Body Weight (IBW): 190 lbs (86 kg)    Admission Body Weight: 207 lb 0.2 oz (93.9 kg)  Current Body Weight: 197 lb 15.6 oz (89.8 kg), 104.2 % IBW. Weight Source: Bed Scale  Current BMI (kg/m2): 25.4                          BMI Categories: Overweight (BMI 25.0-29. 9)    Estimated Daily Nutrient Needs:  Energy Requirements Based On: Kcal/kg  Weight Used for Energy Requirements: Current  Energy (kcal/day): 2000 kcal/day  Weight Used for Protein Requirements: Ideal  Protein (g/day): 105-130 g pro/day  Method Used for Fluid Requirements: Other (Comment)  Fluid (ml/day): per MD    Nutrition Diagnosis:   Inadequate oral intake related to impaired respiratory function as evidenced by NPO or clear liquid status due to medical condition, nutrition support - enteral nutrition    Nutrition Interventions:   Food and/or Nutrient Delivery: Modify Tube Feeding  Nutrition Education/Counseling: No recommendation at this time  Coordination of Nutrition Care: Continue to monitor while inpatient  Plan of Care discussed with: RN    Goals:  Previous Goal Met: Progressing toward Goal(s)  Goals: Meet at least 75% of estimated needs, within 7 days       Nutrition Monitoring and Evaluation:   Behavioral-Environmental Outcomes: None Identified  Food/Nutrient Intake Outcomes: Enteral Nutrition Intake/Tolerance  Physical Signs/Symptoms Outcomes: Weight, Biochemical Data, Nutrition Focused Physical Findings    Discharge Planning:     Too soon to determine     Cyn Collins MS, RD, LD  Contact: [phone]

## 2022-12-03 NOTE — PLAN OF CARE
Problem: Discharge Planning  Goal: Discharge to home or other facility with appropriate resources  12/3/2022 0957 by Teo Medrano RN  Outcome: Progressing     Problem: Safety - Medical Restraint  Goal: Remains free of injury from restraints (Restraint for Interference with Medical Device)  Description: INTERVENTIONS:  1. Determine that other, less restrictive measures have been tried or would not be effective before applying the restraint  2. Evaluate the patient's condition at the time of restraint application  3. Inform patient/family regarding the reason for restraint  4. Q2H: Monitor safety, psychosocial status, comfort, nutrition and hydration  12/3/2022 0957 by Teo Medrano RN  Outcome: Progressing  Flowsheets (Taken 12/3/2022 0800)  Remains free of injury from restraints (restraint for interference with medical device): Evaluate the patient's condition at the time of restraint application     Problem: Confusion  Goal: Confusion, delirium, dementia, or psychosis is improved or at baseline  Description: INTERVENTIONS:  1. Assess for possible contributors to thought disturbance, including medications, impaired vision or hearing, underlying metabolic abnormalities, dehydration, psychiatric diagnoses, and notify attending LIP  2. [place] high risk fall precautions, as indicated  3. Provide frequent short contacts to provide reality reorientation, refocusing and direction  4. Decrease environmental stimuli, including noise as appropriate  5. Monitor and intervene to maintain adequate nutrition, hydration, elimination, sleep and activity  6. If unable to ensure safety without constant attention obtain sitter and review sitter guidelines with assigned personnel  7.  Initiate Psychosocial CNS and Spiritual Care consult, as indicated  12/3/2022 0957 by Teo Medrano RN  Outcome: Progressing     Problem: Pain  Goal: Verbalizes/displays adequate comfort level or baseline comfort level  12/3/2022 0957 by Carlos Barron RN  Outcome: Progressing     Problem: Respiratory - Adult  Goal: Achieves optimal ventilation and oxygenation  12/3/2022 0957 by Carlos Barron RN  Outcome: Progressing     Problem: Nutrition Deficit:  Goal: Optimize nutritional status  12/3/2022 0957 by Carlos Barron RN  Outcome: Progressing     Problem: Skin/Tissue Integrity  Goal: Absence of new skin breakdown  Description: 1. Monitor for areas of redness and/or skin breakdown  2. Assess vascular access sites hourly  3. Every 4-6 hours minimum:  Change oxygen saturation probe site  4. Every 4-6 hours:  If on nasal continuous positive airway pressure, respiratory therapy assess nares and determine need for appliance change or resting period.   12/3/2022 0957 by Carlos Barron RN  Outcome: Progressing     Problem: Safety - Adult  Goal: Free from fall injury  Outcome: Progressing     Problem: ABCDS Injury Assessment  Goal: Absence of physical injury  Outcome: Progressing

## 2022-12-03 NOTE — PLAN OF CARE
Problem: Discharge Planning  Goal: Discharge to home or other facility with appropriate resources  Outcome: Progressing     Problem: Safety - Medical Restraint  Goal: Remains free of injury from restraints (Restraint for Interference with Medical Device)  Description: INTERVENTIONS:  1. Determine that other, less restrictive measures have been tried or would not be effective before applying the restraint  2. Evaluate the patient's condition at the time of restraint application  3. Inform patient/family regarding the reason for restraint  4. Q2H: Monitor safety, psychosocial status, comfort, nutrition and hydration  Outcome: Progressing     Problem: Confusion  Goal: Confusion, delirium, dementia, or psychosis is improved or at baseline  Description: INTERVENTIONS:  1. Assess for possible contributors to thought disturbance, including medications, impaired vision or hearing, underlying metabolic abnormalities, dehydration, psychiatric diagnoses, and notify attending LIP  2. [place] high risk fall precautions, as indicated  3. Provide frequent short contacts to provide reality reorientation, refocusing and direction  4. Decrease environmental stimuli, including noise as appropriate  5. Monitor and intervene to maintain adequate nutrition, hydration, elimination, sleep and activity  6. If unable to ensure safety without constant attention obtain sitter and review sitter guidelines with assigned personnel  7. Initiate Psychosocial CNS and Spiritual Care consult, as indicated  Outcome: Progressing     Problem: Pain  Goal: Verbalizes/displays adequate comfort level or baseline comfort level  Outcome: Progressing     Problem: Respiratory - Adult  Goal: Achieves optimal ventilation and oxygenation  12/3/2022 0359 by Brianna Modi RN  Outcome: Progressing     Problem: Skin/Tissue Integrity  Goal: Absence of new skin breakdown  Description: 1. Monitor for areas of redness and/or skin breakdown  2.   Assess vascular access sites hourly  3. Every 4-6 hours minimum:  Change oxygen saturation probe site  4. Every 4-6 hours:  If on nasal continuous positive airway pressure, respiratory therapy assess nares and determine need for appliance change or resting period.   Outcome: Progressing

## 2022-12-03 NOTE — PROGRESS NOTES
[facility]   Progress Note                   Date:   12/3/2022  Patient name: Werner Mendoza  Date of admission:  11/29/2022 12:20 PM  MRN:   [id number]  YOB: 1945  PCP: Rosa Forbes    Reason for Admission:     Subjective:       Patient seen and examined. Patient got re intubated around 4 pm yesterday after he was agitated and not clearing up secretion. Currently on propofol . CT negative  Post EGD with visible bleeding duodenal ulcer which was cauterized with bipolar. Hemoglobin 8.5>8>8.3 >8.4  Trop downtrending in 500s . Were in 1k . No AC foir 3 days post egd  11/29/2022 . AC FROM Today if ok with gi     Medications:   Scheduled Meds:   piperacillin-tazobactam  3,375 mg IntraVENous Q8H    vancomycin (VANCOCIN) intermittent dosing (placeholder)   Other RX Placeholder    vancomycin  1,500 mg IntraVENous Q24H    pantoprazole (PROTONIX) 40 mg injection  40 mg IntraVENous Q12H    insulin glargine  15 Units SubCUTAneous BID    sodium chloride flush  5-40 mL IntraVENous 2 times per day    insulin lispro  0-8 Units SubCUTAneous TID WC    insulin lispro  0-4 Units SubCUTAneous Nightly     Continuous Infusions:   propofol 40 mcg/kg/min (12/03/22 0400)    dexmedetomidine Stopped (12/02/22 1630)    sodium chloride      sodium chloride 10 mL/hr at 12/03/22 0400    fentaNYL 50 mcg/mL Stopped (12/01/22 1309)    norepinephrine Stopped (11/30/22 1916)    sodium chloride 50 mL/hr at 12/03/22 0400    dextrose       CBC:   Recent Labs     12/01/22  0614 12/01/22  1603 12/02/22  0542 12/02/22  0750 12/02/22  1645 12/03/22  0003   WBC 8.5  --  7.5  --   --   --    HGB 8.5*   < > 8.3* 8.8* 9.2* 8.4*   PLT 99*  --  120*  --   --   --     < > = values in this interval not displayed.        BMP:    Recent Labs     12/01/22  0810 12/02/22  0542    140   K 3.9 4.0    113*   CO2 19* 17*   BUN 20 17   CREATININE 1.15 1.02   GLUCOSE 332* 172*       Hepatic:   No results for input(s): AST, normal   excursion. Aortic Valve: The aortic valve is congenitally bicuspid. The leaflets   exhibit normal excursion. There is moderate sclerosis. There is no   regurgitation. There is mild stenosis. The calculated aortic valve area is   1.69 cm2. The calculated aortic valve peak gradient is 11.83 mmHg. The   calculated aortic valve mean gradient is 6.00 mmHg. IMPRESSION:    Shock likely hypovolemic secondary to bleeding duodenal ulcer, currently on pressor support. Status post EGD 11/29/2022 with bipolar cautery of bleeding duodenal ulcer. CAD status post PCI X 3 in 2012 and CABG X2 with LIMA to distal LAD, SVG to OM1 with exploration of distal PDA 10/25/2022. A. fib which was newly diagnosed in November 2022, on Coumadin and amiodarone. Preserved LVEF on echocardiogram as above. Elevated troponin 910 with mild uptrend. Likely secondary to bleed. Acute hypoxic respiratory failure secondary to above. Bicuspid Aortic valve. Hypertension. Hyperlipidemia. Chronic LBBB. Reintubated 12/2 /2022       RECOMMENDATIONS:  Patient is out of hypovolemic shock . Waiting for GI to clear for [facility] . Appreciate GI recommendation regarding anticoagulation. Wait for 3 days post EGD 11/29/2022 . likely from today if ok with GI . Hb stable   Discussed watchman procedure outpatient with [facility] . Will continue to follow. Supportive care for now. Jacinta Latham MD  Internal Medicine Resident, PGY3  [address] [place] [address], [address]  12/3/2022,5:19 AM      Attending note. The patient was seen and examined agree with the evaluation and plan above. Intubated and sedated. We will resume anticoagulation with okay with the primary service. Currently on p.o. amiodarone. Normal sinus rhythm. Continue supportive therapy. We will follow.

## 2022-12-03 NOTE — PLAN OF CARE
Problem: Respiratory - Adult  Goal: Achieves optimal ventilation and oxygenation  12/2/2022 2159 by Purvi Hernandez RCP  Outcome: Progressing  Flowsheets (Taken 12/2/2022 2159)  Achieves optimal ventilation and oxygenation:   Assess for changes in respiratory status   Assess the need for suctioning and aspirate as needed   Respiratory therapy support as indicated   Assess for changes in mentation and behavior   Oxygen supplementation based on oxygen saturation or arterial blood gases

## 2022-12-03 NOTE — PROGRESS NOTES
Critical Care Team - Daily Progress Note      Date and time: 12/3/2022 8:44 AM  Patient's name:  Barb Mulligan  Medical Record Number: [id number]  Patient's account/billing number: [de-identified]  Patient's YOB: 1945  Age: 68 y.o. Date of Admission: 11/29/2022 12:20 PM  Length of stay during current admission: 4      Primary Care Physician: Froilan Olivera  ICU Attending Physician: Dr. Ryan Wilson Status: Full Code    Reason for ICU admission: No chief complaint on file. Barb Mulligan is a 68 [facility] past medical history significant for CAD s/p CABG 10/25/2022, atrial fibrillation on Coumadin, diabetes, , HTN, HLD, depression. Presented for painless rectal bleeding multiple times with hg dropped from 8.6 to 6.8. received 2 units PRBC, 2 units FFP, and 1 unit cryoprecipitate as well as TXA, 10 mg of vitamin K.and 2000 units Kcentra for INR of 3.09. EGD 11/28/2022 and found to have a duodenal ulcer with duodenitis but no active bleeding. He had a colonoscopy with reports of no source of bleeding found. According to the chart of transfer, patient had multiple episodes of hematemesis and the repeat EGD showed old blood and no active duodenal ulcer treated with epinephrine. Patient was intebated and transfereed to [facility] for higher care    SUBJECTIVE:     OVERNIGHT EVENTS:         Patient was bit hypotensive overnight and was started on IV antibiotics for presumed HAP     Today:  Patient is currently intubated patient is currently intubated and on pressor support with Levophed           F.A.S.T. M. H.U.G.S. B.I.D. Feeding Diet: Diet NPO  ADULT TUBE FEEDING; Orogastric; Diabetic; Continuous; 40; Yes; 10; Q 4 hours; 65; 30; Q 4 hours  Fluids: [email or web address]  Family: Will update the family  Analgesic: Tylenol as needed  Sedation: Propofol`  Thrombo-prophylaxis: [] Enoxaparin, [] Unfract.  Heparin Subcutaneously, [x] EPC Cuffs  Mobility: nonmobile, intubated  Heads up: 30 degrees  Ulcer prophylaxis: [x] PPI Agent, [] [id number], [] Sucralfate, [] Other:   Glycemic control: SSI, lantus 15 units nightly  Spontaneous breathing trial: pending   Bowel regimen/urine output: 2328 ML/ml/ 1458  Indwelling catheter/lines: CVC (), PIVx2, felix ()  De-escalation: None       AWAKE & FOLLOWING COMMANDS:  [x] No   [] Yes    CURRENT VENTILATION STATUS:     [x] Ventilator  [] BIPAP  [] Nasal Cannula [] Room Air      IF INTUBATED, ET TUBE MARKING AT LOWER LIP:       cms    SECRETIONS Amount:  [x] Small [] Moderate  [] Large  [] None  Color:     [] White [] Colored  [] Bloody    SEDATION:  RAAS Score:  [x] Propofol gtt  [] Versed gtt  [] Ativan gtt   [] No Sedation    PARALYZED:  [] No    [x] Yes    DIARRHEA:                [x] No                [] Yes  (C. Difficile status: [] positive                                                                                                                       [] negative                                                                                                                     [] pending)    VASOPRESSORS:  [] No    [x] Yes    If yes -   [x] Levophed       [] Dopamine     [] Vasopressin       [] Dobutamine  [] Phenylephrine         [] Epinephrine    CENTRAL LINES:     [] No   [x] Yes   (Date of Insertion:   2022 )           If yes -     [] Right IJ     [] Left IJ [] Right Femoral [] Left Femoral                   [] Right Subclavian [] Left Subclavian       FEILX'S CATHETER:   [] No   [x] Yes  (Date of Insertion:   )     URINE OUTPUT:            [x] Good   [] Low              [] Anuric      OBJECTIVE:     VITAL SIGNS:  BP (!) 95/41   Pulse 74   Temp 98.4 °F (36.9 °C) (Bladder)   Resp 15   Ht 6' 2\" (1.88 m)   Wt 197 lb 15.6 oz (89.8 kg)   SpO2 100%   BMI 25.42 kg/m²   Tmax over 24 hours:  Temp (24hrs), Av.7 °F (38.2 °C), Min:98.4 °F (36.9 °C), Max:102.6 °F (39.2 °C)      Patient Vitals for the past 8 hrs:   BP Temp Temp src Pulse Resp SpO2 Weight   12/03/22 0630 (!) 95/41 -- -- 74 15 100 % --   12/03/22 0600 (!) 96/45 98.4 °F (36.9 °C) Bladder 74 20 100 % 197 lb 15.6 oz (89.8 kg)   12/03/22 0530 (!) 94/48 -- -- 69 10 100 % --   12/03/22 0500 (!) 103/48 -- -- 81 18 100 % --   12/03/22 0430 (!) 81/47 -- -- 78 29 100 % --   12/03/22 0400 (!) 83/39 (!) 100.8 °F (38.2 °C) Bladder 85 18 99 % --   12/03/22 0345 -- -- -- 90 22 100 % --   12/03/22 0330 (!) 97/43 -- -- 93 19 99 % --   12/03/22 0305 (!) 94/43 -- -- 87 17 -- --   12/03/22 0300 (!) 88/43 -- -- 87 17 100 % --   12/03/22 0230 (!) 89/43 -- -- 81 15 100 % --   12/03/22 0210 (!) 96/44 -- -- 83 15 -- --   12/03/22 0200 (!) 97/43 (!) 100.9 °F (38.3 °C) Bladder 81 15 100 % --   12/03/22 0130 (!) 110/45 -- -- 83 17 99 % --   12/03/22 0100 (!) 95/42 -- -- 81 12 100 % --           Intake/Output Summary (Last 24 hours) at 12/3/2022 0844  Last data filed at 12/3/2022 0835  Gross per 24 hour   Intake 2670.71 ml   Output 2158 ml   Net 512.71 ml       Date 12/03/22 0000 - 12/03/22 2359   Shift 7473-6123 4281-1340 4212-3431 24 Hour Total   INTAKE   I.V.(mL/kg) 735. 2(8.2) 83.9(0.9)  819.1(9.1)   NG/GT(mL/kg) 330(3.7)   330(3.7)   IV Piggyback(mL/kg) 310.3(3.5) 14.4(0.2)  324. 7(3.6)   Shift Total(mL/kg) 1375. 5(15.3) 98.3(1.1)  1473.8(16.4)   OUTPUT   Urine(mL/kg/hr) 218(0.3)   218   Shift Total(mL/kg) 926(2.7)   218(2.4)   Weight (kg) 89.8 89.8 89.8 89.8       Wt Readings from Last 3 Encounters:   12/03/22 197 lb 15.6 oz (89.8 kg)     Body mass index is 25.42 kg/m². PHYSICAL EXAM:  Constitutional: Intubated and sedated  EENT: PERRLA  Neck: Supple, symmetrical, trachea midline,   Respiratory: Bilateral normal vesicular breathing  Cardiovascular: regular rate and rhythm, normal S1, S2, no murmur noted and 2+ pulses throughout  Abdomen: soft, nontender, nondistended, no masses or organomegaly  NEUROLOGIC: Intubated and sedated.   Extremities:  peripheral pulses normal, no pedal edema, no clubbing or cyanosis  SKIN: normal coloration and turgor    Any additional physical findings:      MEDICATIONS:  Scheduled Meds:   amiodarone  200 mg Oral Daily    piperacillin-tazobactam  3,375 mg IntraVENous Q8H    vancomycin (VANCOCIN) intermittent dosing (placeholder)   Other RX Placeholder    vancomycin  1,500 mg IntraVENous Q24H    pantoprazole (PROTONIX) 40 mg injection  40 mg IntraVENous Q12H    insulin glargine  15 Units SubCUTAneous BID    sodium chloride flush  5-40 mL IntraVENous 2 times per day    insulin lispro  0-8 Units SubCUTAneous TID WC    insulin lispro  0-4 Units SubCUTAneous Nightly     Continuous Infusions:   propofol 40 mcg/kg/min (12/03/22 0835)    dexmedetomidine Stopped (12/02/22 1630)    sodium chloride      sodium chloride 10 mL/hr at 12/03/22 0600    fentaNYL 50 mcg/mL Stopped (12/01/22 1309)    norepinephrine 4 mcg/min (12/03/22 0835)    sodium chloride 50 mL/hr at 12/03/22 0835    dextrose       PRN Meds:   LORazepam, 1 mg, Q6H PRN  sodium chloride, , PRN  sodium chloride flush, 5-40 mL, PRN  sodium chloride, , PRN  ondansetron, 4 mg, Q8H PRN   Or  ondansetron, 4 mg, Q6H PRN  polyethylene glycol, 17 g, Daily PRN  acetaminophen, 650 mg, Q6H PRN   Or  acetaminophen, 650 mg, Q6H PRN  sodium phosphate IVPB, 10 mmol, PRN   Or  sodium phosphate IVPB, 15 mmol, PRN   Or  sodium phosphate IVPB, 20 mmol, PRN  magnesium sulfate, 2,000 mg, PRN  potassium chloride, 20 mEq, PRN   Or  potassium chloride, 10 mEq, PRN  glucose, 4 tablet, PRN  dextrose bolus, 125 mL, PRN   Or  dextrose bolus, 250 mL, PRN  glucagon (rDNA), 1 mg, PRN  dextrose, , Continuous PRN      SUPPORT DEVICES: [x] Ventilator [] BIPAP  [] Nasal Cannula [] Room Air    VENT SETTINGS (Comprehensive) (if applicable):  Vent Information  Equipment Changed: Airway securing device  Ventilator Initiate: Yes  Ventilator Discontinue: Yes  Vent Mode: AC/PRVC  Additional Respiratory Assessments  Heart Rate: 74  Resp: 15  SpO2: 100 %  End Tidal CO2: 23 (%)  Position: Semi-Hardy's  Humidification Source: HME  Cuff Pressure (cm H2O):  (mov)    ABGs:   Lab Results   Component Value Date/Time    FIO2 40.0 12/01/2022 04:57 AM     Lactic Acid: No results found for: LACTA      DATA:  Complete Blood Count:   Recent Labs     12/01/22  0614 12/01/22  1603 12/02/22  0542 12/02/22  0750 12/02/22  1645 12/03/22  0003   WBC 8.5  --  7.5  --   --   --    HGB 8.5*   < > 8.3* 8.8* 9.2* 8.4*   MCV 91.7  --  93.3  --   --   --    PLT 99*  --  120*  --   --   --    RBC 2.88*  --  2.82*  --   --   --    HCT 26.4*   < > 26.3* 29.0* 28.0* 26.9*   MCH 29.5  --  29.4  --   --   --    MCHC 32.2  --  31.6  --   --   --    RDW 16.4*  --  16.8*  --   --   --    MPV 9.7  --  10.0  --   --   --     < > = values in this interval not displayed. PT/INR:    Lab Results   Component Value Date/Time    PROTIME 13.4 11/30/2022 04:31 AM    INR 1.3 11/30/2022 04:31 AM     PTT:  No results found for: APTT, PTT    Basal Metabolic Profile:   Recent Labs     12/01/22  0810 12/02/22  0542    140   K 3.9 4.0   BUN 20 17   CREATININE 1.15 1.02    113*   CO2 19* 17*        Magnesium:   Lab Results   Component Value Date/Time    MG 2.0 11/29/2022 10:52 PM     Phosphorus: No results found for: PHOS  S. Calcium:  Recent Labs     12/02/22  0542   CALCIUM 7.7*       S. Ionized Calcium:No results for input(s): IONCA in the last 72 hours. Urinalysis:   Lab Results   Component Value Date/Time    WBCUA 20 TO 50 11/29/2022 01:45 PM    RBCUA 2 TO 5 11/29/2022 01:45 PM       CARDIAC ENZYMES: No results for input(s): CKMB, CKMBINDEX, TROPONINI in the last 72 hours. Invalid input(s): CKTOTAL;3  BNP: No results for input(s): BNP in the last 72 hours. LFTS  No results for input(s): ALKPHOS, ALT, AST, BILITOT, BILIDIR, LABALBU in the last 72 hours. AMYLASE/LIPASE/AMMONIA  No results for input(s): AMYLASE, LIPASE, AMMONIA in the last 72 hours.       Last 3 Blood Glucose:   Recent Labs 12/01/22  0810 12/02/22  0542   GLUCOSE 332* 172*        HgBA1c:    Lab Results   Component Value Date/Time    LABA1C 6.1 11/29/2022 02:06 PM         TSH:  No results found for: TSH  ANEMIA STUDIES  No results for input(s): LABIRON, TIBC, FERRITIN, [id number], FOLATE, OCCULTBLD in the last 72 hours. Cultures during this admission:     Blood cultures:                 [] None drawn      [] Negative             []  Positive (Details:  )  Urine Culture:                   [] None drawn      [] Negative             []  Positive (Details:  )  Sputum Culture:               [] None drawn       [] Negative             []  Positive (Details:  )   Endotracheal aspirate:     [] None drawn       [] Negative             []  Positive (Details:  )     Chest Xray (12/3/2022):    ASSESSMENT:     1) Hypovolemic shock 2/2 acute blood loss from UGIB  2) CAD s/p CABG 1 month ago  3) Atrial fibrillation on coumadin  4) NSTEMI  5) ERICA 2/2 hypovolemia 2/2 shock and GI bleed  6) HTN   7) HLD  8) Depression   9) Combined acute gapped metabolic acidosis with acute respiratory alkalosis    PLAN:     Neuro  - Patient is intubated and sedated  -Patient is on Propofol for sedation  -Zyprexa as needed  -Cont neuro checks as per protocol    CV  -Patient intermittently goes into A.fib with RVR  -Resumed the amiodarone as per the cardio recommendations  -Start anticoagulation once cleared by Nephrology  -Keep MAP > 65      Resp  -On Oxygen through the nasal cannula  -Keep Oxygen saturation above 92%  -Continue to monitor    GI  - UGI bleed 2/2 duodenal ulcer  -S/p EGD 11/29 --> duodenal ulcer with visible vessel cauterized with bipolar probe, old blood in the stomach.  - GI recs post EGD --> continue with PPI drip for another 24 to 48 hours and then twice daily for 8 weeks.   - Diet NPO  ADULT TUBE FEEDING; Orogastric; Diabetic; Continuous; 40; Yes; 10; Q 4 hours; 65; 30; Q 4 hours  -Cont Protonix IV BID    /Renal  - Na 140 (137) / K 4.4 (4.8)  - BUN 17 (35) / Cr 1.42 (1.69)  -Good urine output    Heme  - HgB 8.4   -Monitor H & H  -Transfuse if HB< 8.0    ID  - Remains afebrile.  Tmax 98.6.   - WBC 8.5 (15.9)   - On Zosyn and Vancomycin for  hospital acquired pneumonia    Endo  - Glucose is reasonable currently on lantus 15 units and also on sliding scale    DVT Ppx: SCDs, no AC until cleared by GI  GI Ppx: Protonix gtt  Diet: Tube feeds    Cher Benitez MD           Department of Internal Medicine/ Critical care  [facility] ([address] [place] [address] Dr)             12/3/2022, 8:44 AM

## 2022-12-03 NOTE — PROGRESS NOTES
[facility] Pharmacokinetic Monitoring Service - Vancomycin     Husam Gamble is a 68 y.o. male starting on vancomycin therapy for pneumonia (HAP). Pharmacy consulted by Maxx Lyle DO for monitoring and adjustment. Target Concentration: Goal AUC/MARIO 400-600 mg*hr/L    Additional Antimicrobials: Zosyn    Pertinent Laboratory Values: Wt Readings from Last 1 Encounters:   12/02/22 200 lb 2.8 oz (90.8 kg)     Temp Readings from Last 1 Encounters:   12/02/22 98.4 °F (36.9 °C) (Bladder)     Estimated Creatinine Clearance: 71 mL/min (based on SCr of 1.02 mg/dL). Recent Labs     12/01/22  0614 12/01/22  0810 12/02/22  0542   CREATININE  --  1.15 1.02   WBC 8.5  --  7.5     Procalcitonin: n/a    Pertinent Cultures:  Culture Date Source Results        MRSA Nasal Swab:  Ordered on 11/29. Negative on 11/30.      Plan:  Dosing recommendations based on Bayesian software  Start vancomycin 1500mg IVPB every 24 hours  Anticipated AUC of 438 and trough concentration of 12.5 at steady state  Renal labs as indicated   Vancomycin concentration ordered for  @    Pharmacy will continue to monitor patient and adjust therapy as indicated    Thank you for the consult,  GEREMIAS Coon TEZ [facility]  12/2/2022 9:48 PM

## 2022-12-04 ENCOUNTER — APPOINTMENT (OUTPATIENT)
Dept: GENERAL RADIOLOGY | Age: 77
DRG: 377 | End: 2022-12-04
Attending: INTERNAL MEDICINE
Payer: MEDICARE

## 2022-12-04 LAB
ABSOLUTE EOS #: 0.21 K/UL (ref 0–0.44)
ABSOLUTE IMMATURE GRANULOCYTE: 0.04 K/UL (ref 0–0.3)
ABSOLUTE LYMPH #: 1 K/UL (ref 1.1–3.7)
ABSOLUTE MONO #: 0.57 K/UL (ref 0.1–1.2)
ALLEN TEST: POSITIVE
ANION GAP SERPL CALCULATED.3IONS-SCNC: 11 MMOL/L (ref 9–17)
BASOPHILS # BLD: 0 % (ref 0–2)
BASOPHILS ABSOLUTE: <0.03 K/UL (ref 0–0.2)
BUN BLDV-MCNC: 19 MG/DL (ref 8–23)
CALCIUM SERPL-MCNC: 7.8 MG/DL (ref 8.6–10.4)
CHLORIDE BLD-SCNC: 107 MMOL/L (ref 98–107)
CO2: 19 MMOL/L (ref 20–31)
CREAT SERPL-MCNC: 1.21 MG/DL (ref 0.7–1.2)
CULTURE: NORMAL
CULTURE: NORMAL
EOSINOPHILS RELATIVE PERCENT: 2 % (ref 1–4)
FIO2: 40
GFR SERPL CREATININE-BSD FRML MDRD: >60 ML/MIN/1.73M2
GLUCOSE BLD-MCNC: 200 MG/DL (ref 75–110)
GLUCOSE BLD-MCNC: 224 MG/DL (ref 75–110)
GLUCOSE BLD-MCNC: 268 MG/DL (ref 75–110)
GLUCOSE BLD-MCNC: 276 MG/DL (ref 75–110)
GLUCOSE BLD-MCNC: 313 MG/DL (ref 70–99)
GLUCOSE BLD-MCNC: 313 MG/DL (ref 74–100)
GLUCOSE BLD-MCNC: 318 MG/DL (ref 75–110)
HCT VFR BLD CALC: 24 % (ref 40.7–50.3)
HCT VFR BLD CALC: 24.4 % (ref 40.7–50.3)
HCT VFR BLD CALC: 24.5 % (ref 40.7–50.3)
HCT VFR BLD CALC: 24.7 % (ref 40.7–50.3)
HCT VFR BLD CALC: 25.5 % (ref 40.7–50.3)
HEMOGLOBIN: 7.4 G/DL (ref 13–17)
HEMOGLOBIN: 7.6 G/DL (ref 13–17)
HEMOGLOBIN: 7.7 G/DL (ref 13–17)
HEMOGLOBIN: 7.7 G/DL (ref 13–17)
HEMOGLOBIN: 7.9 G/DL (ref 13–17)
IMMATURE GRANULOCYTES: 1 %
LYMPHOCYTES # BLD: 11 % (ref 24–43)
Lab: NORMAL
Lab: NORMAL
MCH RBC QN AUTO: 28.8 PG (ref 25.2–33.5)
MCH RBC QN AUTO: 29.4 PG (ref 25.2–33.5)
MCHC RBC AUTO-ENTMCNC: 30.2 G/DL (ref 28.4–34.8)
MCHC RBC AUTO-ENTMCNC: 31.2 G/DL (ref 28.4–34.8)
MCV RBC AUTO: 92.5 FL (ref 82.6–102.9)
MCV RBC AUTO: 97.2 FL (ref 82.6–102.9)
MODE: ABNORMAL
MONOCYTES # BLD: 7 % (ref 3–12)
NEGATIVE BASE EXCESS, ART: 5 (ref 0–2)
NRBC AUTOMATED: 0 PER 100 WBC
NRBC AUTOMATED: 0 PER 100 WBC
O2 DEVICE/FLOW/%: ABNORMAL
PARTIAL THROMBOPLASTIN TIME: 28 SEC (ref 20.5–30.5)
PARTIAL THROMBOPLASTIN TIME: 37.1 SEC (ref 20.5–30.5)
PDW BLD-RTO: 17.2 % (ref 11.8–14.4)
PDW BLD-RTO: 17.2 % (ref 11.8–14.4)
PLATELET # BLD: 169 K/UL (ref 138–453)
PLATELET # BLD: 172 K/UL (ref 138–453)
PMV BLD AUTO: 9.7 FL (ref 8.1–13.5)
PMV BLD AUTO: 9.8 FL (ref 8.1–13.5)
POC HCO3: 20.1 MMOL/L (ref 21–28)
POC O2 SATURATION: 81 % (ref 94–98)
POC PCO2: 35.6 MM HG (ref 35–48)
POC PH: 7.36 (ref 7.35–7.45)
POC PO2: 46.7 MM HG (ref 83–108)
POTASSIUM SERPL-SCNC: 3.7 MMOL/L (ref 3.7–5.3)
RBC # BLD: 2.52 M/UL (ref 4.21–5.77)
RBC # BLD: 2.67 M/UL (ref 4.21–5.77)
RBC # BLD: ABNORMAL 10*6/UL
SAMPLE SITE: ABNORMAL
SEG NEUTROPHILS: 79 % (ref 36–65)
SEGMENTED NEUTROPHILS ABSOLUTE COUNT: 6.94 K/UL (ref 1.5–8.1)
SODIUM BLD-SCNC: 137 MMOL/L (ref 135–144)
SPECIMEN DESCRIPTION: NORMAL
SPECIMEN DESCRIPTION: NORMAL
VANCOMYCIN RANDOM: 11.2 UG/ML
WBC # BLD: 7.5 K/UL (ref 3.5–11.3)
WBC # BLD: 8.8 K/UL (ref 3.5–11.3)

## 2022-12-04 PROCEDURE — 94003 VENT MGMT INPAT SUBQ DAY: CPT

## 2022-12-04 PROCEDURE — 80048 BASIC METABOLIC PNL TOTAL CA: CPT

## 2022-12-04 PROCEDURE — 87205 SMEAR GRAM STAIN: CPT

## 2022-12-04 PROCEDURE — 36600 WITHDRAWAL OF ARTERIAL BLOOD: CPT

## 2022-12-04 PROCEDURE — 85025 COMPLETE CBC W/AUTO DIFF WBC: CPT

## 2022-12-04 PROCEDURE — 6370000000 HC RX 637 (ALT 250 FOR IP)

## 2022-12-04 PROCEDURE — 85018 HEMOGLOBIN: CPT

## 2022-12-04 PROCEDURE — 2700000000 HC OXYGEN THERAPY PER DAY

## 2022-12-04 PROCEDURE — 2580000003 HC RX 258: Performed by: STUDENT IN AN ORGANIZED HEALTH CARE EDUCATION/TRAINING PROGRAM

## 2022-12-04 PROCEDURE — 6360000002 HC RX W HCPCS

## 2022-12-04 PROCEDURE — 85014 HEMATOCRIT: CPT

## 2022-12-04 PROCEDURE — 2500000003 HC RX 250 WO HCPCS: Performed by: HEALTH CARE PROVIDER

## 2022-12-04 PROCEDURE — 6360000002 HC RX W HCPCS: Performed by: HEALTH CARE PROVIDER

## 2022-12-04 PROCEDURE — 36415 COLL VENOUS BLD VENIPUNCTURE: CPT

## 2022-12-04 PROCEDURE — 6360000002 HC RX W HCPCS: Performed by: STUDENT IN AN ORGANIZED HEALTH CARE EDUCATION/TRAINING PROGRAM

## 2022-12-04 PROCEDURE — 2000000000 HC ICU R&B

## 2022-12-04 PROCEDURE — 71045 X-RAY EXAM CHEST 1 VIEW: CPT

## 2022-12-04 PROCEDURE — 6370000000 HC RX 637 (ALT 250 FOR IP): Performed by: STUDENT IN AN ORGANIZED HEALTH CARE EDUCATION/TRAINING PROGRAM

## 2022-12-04 PROCEDURE — 82803 BLOOD GASES ANY COMBINATION: CPT

## 2022-12-04 PROCEDURE — C9113 INJ PANTOPRAZOLE SODIUM, VIA: HCPCS | Performed by: STUDENT IN AN ORGANIZED HEALTH CARE EDUCATION/TRAINING PROGRAM

## 2022-12-04 PROCEDURE — 2580000003 HC RX 258: Performed by: HEALTH CARE PROVIDER

## 2022-12-04 PROCEDURE — 94761 N-INVAS EAR/PLS OXIMETRY MLT: CPT

## 2022-12-04 PROCEDURE — 85027 COMPLETE CBC AUTOMATED: CPT

## 2022-12-04 PROCEDURE — 99291 CRITICAL CARE FIRST HOUR: CPT | Performed by: INTERNAL MEDICINE

## 2022-12-04 PROCEDURE — 80202 ASSAY OF VANCOMYCIN: CPT

## 2022-12-04 PROCEDURE — 82947 ASSAY GLUCOSE BLOOD QUANT: CPT

## 2022-12-04 PROCEDURE — 85730 THROMBOPLASTIN TIME PARTIAL: CPT

## 2022-12-04 RX ORDER — HEPARIN SODIUM AND DEXTROSE 10000; 5 [USP'U]/100ML; G/100ML
5-30 INJECTION INTRAVENOUS CONTINUOUS
Status: DISCONTINUED | OUTPATIENT
Start: 2022-12-04 | End: 2022-12-05

## 2022-12-04 RX ORDER — HEPARIN SODIUM 1000 [USP'U]/ML
4000 INJECTION, SOLUTION INTRAVENOUS; SUBCUTANEOUS ONCE
Status: COMPLETED | OUTPATIENT
Start: 2022-12-04 | End: 2022-12-04

## 2022-12-04 RX ORDER — HEPARIN SODIUM 1000 [USP'U]/ML
4000 INJECTION, SOLUTION INTRAVENOUS; SUBCUTANEOUS PRN
Status: DISCONTINUED | OUTPATIENT
Start: 2022-12-04 | End: 2022-12-06

## 2022-12-04 RX ORDER — INSULIN GLARGINE 100 [IU]/ML
20 INJECTION, SOLUTION SUBCUTANEOUS 2 TIMES DAILY
Status: DISCONTINUED | OUTPATIENT
Start: 2022-12-04 | End: 2022-12-07

## 2022-12-04 RX ORDER — 0.9 % SODIUM CHLORIDE 0.9 %
500 INTRAVENOUS SOLUTION INTRAVENOUS ONCE
Status: DISCONTINUED | OUTPATIENT
Start: 2022-12-04 | End: 2022-12-04

## 2022-12-04 RX ORDER — HEPARIN SODIUM 1000 [USP'U]/ML
2000 INJECTION, SOLUTION INTRAVENOUS; SUBCUTANEOUS PRN
Status: DISCONTINUED | OUTPATIENT
Start: 2022-12-04 | End: 2022-12-06

## 2022-12-04 RX ORDER — FUROSEMIDE 10 MG/ML
20 INJECTION INTRAMUSCULAR; INTRAVENOUS ONCE
Status: COMPLETED | OUTPATIENT
Start: 2022-12-04 | End: 2022-12-04

## 2022-12-04 RX ORDER — INSULIN LISPRO 100 [IU]/ML
0-8 INJECTION, SOLUTION INTRAVENOUS; SUBCUTANEOUS EVERY 4 HOURS
Status: DISCONTINUED | OUTPATIENT
Start: 2022-12-04 | End: 2022-12-06

## 2022-12-04 RX ADMIN — PROPOFOL 10 MCG/KG/MIN: 10 INJECTION, EMULSION INTRAVENOUS at 03:26

## 2022-12-04 RX ADMIN — INSULIN GLARGINE 15 UNITS: 100 INJECTION, SOLUTION SUBCUTANEOUS at 08:39

## 2022-12-04 RX ADMIN — AMIODARONE HYDROCHLORIDE 200 MG: 200 TABLET ORAL at 08:02

## 2022-12-04 RX ADMIN — DEXMEDETOMIDINE HYDROCHLORIDE 0.8 MCG/KG/HR: 4 INJECTION, SOLUTION INTRAVENOUS at 21:04

## 2022-12-04 RX ADMIN — DEXMEDETOMIDINE HYDROCHLORIDE 0.4 MCG/KG/HR: 4 INJECTION, SOLUTION INTRAVENOUS at 06:24

## 2022-12-04 RX ADMIN — SODIUM CHLORIDE, PRESERVATIVE FREE 40 MG: 5 INJECTION INTRAVENOUS at 15:44

## 2022-12-04 RX ADMIN — FUROSEMIDE 20 MG: 10 INJECTION, SOLUTION INTRAMUSCULAR; INTRAVENOUS at 03:29

## 2022-12-04 RX ADMIN — SODIUM CHLORIDE: 9 INJECTION, SOLUTION INTRAVENOUS at 16:50

## 2022-12-04 RX ADMIN — OLANZAPINE 5 MG: 5 TABLET, FILM COATED ORAL at 20:35

## 2022-12-04 RX ADMIN — SODIUM CHLORIDE, PRESERVATIVE FREE 10 ML: 5 INJECTION INTRAVENOUS at 08:03

## 2022-12-04 RX ADMIN — SODIUM CHLORIDE, PRESERVATIVE FREE 10 ML: 5 INJECTION INTRAVENOUS at 20:35

## 2022-12-04 RX ADMIN — HEPARIN SODIUM 11 UNITS/KG/HR: 10000 INJECTION, SOLUTION INTRAVENOUS at 13:25

## 2022-12-04 RX ADMIN — SODIUM CHLORIDE, PRESERVATIVE FREE 40 MG: 5 INJECTION INTRAVENOUS at 03:25

## 2022-12-04 RX ADMIN — INSULIN LISPRO 4 UNITS: 100 INJECTION, SOLUTION INTRAVENOUS; SUBCUTANEOUS at 16:40

## 2022-12-04 RX ADMIN — HEPARIN SODIUM 4000 UNITS: 1000 INJECTION INTRAVENOUS; SUBCUTANEOUS at 13:21

## 2022-12-04 RX ADMIN — DEXMEDETOMIDINE HYDROCHLORIDE 1 MCG/KG/HR: 4 INJECTION, SOLUTION INTRAVENOUS at 16:36

## 2022-12-04 RX ADMIN — PIPERACILLIN AND TAZOBACTAM 3375 MG: 3; .375 INJECTION, POWDER, FOR SOLUTION INTRAVENOUS at 04:22

## 2022-12-04 RX ADMIN — SODIUM CHLORIDE: 9 INJECTION, SOLUTION INTRAVENOUS at 22:55

## 2022-12-04 RX ADMIN — Medication 1500 MG: at 22:57

## 2022-12-04 RX ADMIN — PROPOFOL 20 MCG/KG/MIN: 10 INJECTION, EMULSION INTRAVENOUS at 19:48

## 2022-12-04 RX ADMIN — INSULIN LISPRO 2 UNITS: 100 INJECTION, SOLUTION INTRAVENOUS; SUBCUTANEOUS at 20:36

## 2022-12-04 RX ADMIN — PIPERACILLIN AND TAZOBACTAM 3375 MG: 3; .375 INJECTION, POWDER, FOR SOLUTION INTRAVENOUS at 13:50

## 2022-12-04 RX ADMIN — HEPARIN SODIUM 2000 UNITS: 1000 INJECTION INTRAVENOUS; SUBCUTANEOUS at 15:48

## 2022-12-04 RX ADMIN — INSULIN LISPRO 6 UNITS: 100 INJECTION, SOLUTION INTRAVENOUS; SUBCUTANEOUS at 08:40

## 2022-12-04 RX ADMIN — INSULIN GLARGINE 20 UNITS: 100 INJECTION, SOLUTION SUBCUTANEOUS at 20:35

## 2022-12-04 RX ADMIN — INSULIN LISPRO 4 UNITS: 100 INJECTION, SOLUTION INTRAVENOUS; SUBCUTANEOUS at 13:22

## 2022-12-04 RX ADMIN — DEXMEDETOMIDINE HYDROCHLORIDE 1 MCG/KG/HR: 4 INJECTION, SOLUTION INTRAVENOUS at 12:25

## 2022-12-04 ASSESSMENT — PULMONARY FUNCTION TESTS
PIF_VALUE: 25
PIF_VALUE: 28
PIF_VALUE: 23
PIF_VALUE: 24
PIF_VALUE: 24
PIF_VALUE: 26
PIF_VALUE: 11
PIF_VALUE: 24
PIF_VALUE: 26
PIF_VALUE: 11
PIF_VALUE: 24
PIF_VALUE: 12
PIF_VALUE: 26
PIF_VALUE: 12
PIF_VALUE: 26
PIF_VALUE: 26
PIF_VALUE: 12
PIF_VALUE: 11
PIF_VALUE: 27
PIF_VALUE: 12
PIF_VALUE: 26

## 2022-12-04 NOTE — PROGRESS NOTES
12/04/22 1338   NICU Vent Information   Vent Mode (S)  AC/PRVC     Pt.  Placed back in [address] secondary to increased RR greater than 30, increased agitation and increased secretions

## 2022-12-04 NOTE — PROGRESS NOTES
Critical Care Team - Daily Progress Note      Date and time: 12/4/2022 8:27 AM  Patient's name:  David Solorzano  Medical Record Number: [id number]  Patient's account/billing number: [de-identified]  Patient's YOB: 1945  Age: 68 y.o. Date of Admission: 11/29/2022 12:20 PM  Length of stay during current admission: 5      Primary Care Physician: Gonsalo Garcia  ICU Attending Physician: Dr. Khai Angel Status: Full Code    Reason for ICU admission: No chief complaint on file. David Solorzano is a 68 y.o. presented from [facility] with past medical history significant for CAD s/p CABG 10/25/2022, atrial fibrillation on Coumadin, diabetes, , HTN, HLD, depression. Presented for painless rectal bleeding multiple times with hg dropped from 8.6 to 6.8. received 2 units PRBC, 2 units FFP, and 1 unit cryoprecipitate as well as TXA, 10 mg of vitamin K.and 2000 units Kcentra for INR of 3.09. EGD 11/28/2022 and found to have a duodenal ulcer with duodenitis but no active bleeding. He had a colonoscopy with reports of no source of bleeding found. According to the chart of transfer, patient had multiple episodes of hematemesis and the repeat EGD showed old blood and no active duodenal ulcer treated with epinephrine. Patient was intebated and transfereed to [facility] for higher care. SUBJECTIVE:     OVERNIGHT EVENTS:         No acute events   TMAX 101.3, received tylenol     Levo weaned overnight, MAP 70s    Low mentation with only unpurposeful movement with sedation holidays.     GI   Black tarry BM x1 overnight,unchanged from previous BM     HgB 7.7 (7.7,8.0, 8.4, 9.2)   Continue Protonix 40mg BID     PRVC: 40%/16/650/8  ABG: pH 7.35; pCO2 35.6; PO2 46.7; HCO3 20.1    Continue Amiodarone for Afib w/ RVR   Cards recommends anticoagulation when appropriate   Heparin gtt to start today      On Zosyn and Vancomycin for hospital acquired pneumonia  WBC 8.8    Na 137 / K 3.7   BUN 19/ Cr 1.21    BG elevated 318 Today:  Patient is currently intubated   Restart heparin gtt for Afib   Increase Lantus to 20 units BID  for hyperglycemia   Sputum for gram stain culture ordered   Continue to monitor closely     F.A.S.T. M. H.U.G.S. B.I.D. Feeding Diet: Diet NPO  ADULT TUBE FEEDING; Orogastric; Peptide Based High Protein; Continuous; 40; Yes; 10; Q 4 hours; 60; 30; Q 4 hours  Fluids: [email or web address]    Family: Updated at bedside   Analgesic: Tylenol as needed  Sedation: Propofol, precedex    Thrombo-prophylaxis: [] Enoxaparin, [] Unfract.  Heparin Subcutaneously, [x] EPC Cuffs  Mobility: nonmobile, intubated  Heads up: 30 degrees  Ulcer prophylaxis: [x] PPI Agent, [] [id number], [] Sucralfate, [] Other:   Glycemic control: SSI, lantus 15 units BID:  this AM   Spontaneous breathing trial: pending   Bowel regimen/urine output: UOP appropriate   Indwelling catheter/lines: CVC (11/29), PIVx2, felix (11/29)  De-escalation: None     AWAKE & FOLLOWING COMMANDS:  [x] No   [] Yes    CURRENT VENTILATION STATUS:     [x] Ventilator  [] BIPAP  [] Nasal Cannula [] Room Air      IF INTUBATED, ET TUBE MARKING AT LOWER LIP:       cms    SECRETIONS Amount:  [x] Small [] Moderate  [] Large  [] None  Color:     [] White [] Colored  [] Bloody    SEDATION:  RAAS Score:  [x] Propofol gtt  [] Versed gtt  [] Ativan gtt   [] No Sedation    PARALYZED:  [x] No    [] Yes    DIARRHEA:                [x] No                [] Yes  (C. Difficile status: [] positive                                                                                                                       [] negative                                                                                                                     [] pending)    VASOPRESSORS:  [x] No    [] Yes    If yes -   [] Levophed       [] Dopamine     [] Vasopressin       [] Dobutamine  [] Phenylephrine         [] Epinephrine    CENTRAL LINES:     [] No   [x] Yes   (Date of Insertion:   11/29/2022 )           If yes -     [] Right IJ     [] Left IJ [] Right Femoral [] Left Femoral                   [] Right Subclavian [] Left Subclavian       BENNETT'S CATHETER:   [] No   [x] Yes  (Date of Insertion:   )     URINE OUTPUT:            [x] Good   [] Low              [] Anuric      OBJECTIVE:     VITAL SIGNS:  BP (!) 106/57   Pulse 75   Temp 99.3 °F (37.4 °C) (Bladder)   Resp 16   Ht 6' 2\" (1.88 m)   Wt 197 lb 15.6 oz (89.8 kg)   SpO2 100%   BMI 25.42 kg/m²   Tmax over 24 hours:  Temp (24hrs), Av.5 °F (37.5 °C), Min:97.9 °F (36.6 °C), Max:101.3 °F (38.5 °C)      Patient Vitals for the past 8 hrs:   BP Temp Temp src Pulse Resp SpO2   22 0800 (!) 106/57 99.3 °F (37.4 °C) Bladder 75 16 100 %   22 0700 -- -- -- 81 18 100 %   22 0645 101/69 -- -- 73 18 --   22 0630 (!) 117/53 -- -- 72 15 --   22 0615 (!) 111/51 -- -- 72 18 --   22 0600 (!) 115/91 98.4 °F (36.9 °C) Bladder 71 19 --   22 0545 120/65 -- -- 71 17 100 %   22 0530 (!) 113/51 -- -- 71 15 --   22 0515 (!) 117/51 -- -- 73 13 --   22 0500 (!) 118/52 -- -- 69 17 --   22 0445 (!) 123/54 -- -- 67 27 --   22 0430 136/62 -- -- 63 16 --   22 0415 (!) 130/58 -- -- 59 16 --   22 0400 (!) 125/55 98.4 °F (36.9 °C) Bladder 56 17 100 %   22 0345 (!) 135/59 -- -- 56 16 --   22 0334 -- -- -- 55 16 100 %   22 0330 (!) 127/53 -- -- 56 16 --   22 0315 (!) 136/59 -- -- 57 16 --   22 0300 (!) 122/54 -- -- 61 16 --   22 0245 (!) 107/50 -- -- 59 17 --   22 0230 (!) 121/57 -- -- 58 16 --   22 0215 (!) 105/52 -- -- 56 16 --   22 0200 (!) 129/49 99.1 °F (37.3 °C) Bladder 57 16 100 %   22 0145 (!) 130/50 -- -- 57 16 --   22 0130 (!) 122/49 -- -- 57 16 --   22 0115 (!) 124/48 -- -- 58 16 --   22 0100 (!) 126/48 -- -- 58 16 --   22 0045 (!) 121/44 -- -- 58 16 --   22 0030 (!) 149/53 -- -- 62 16 --           Intake/Output Summary (Last 24 hours) at 12/4/2022 0827  Last data filed at 12/4/2022 0800  Gross per 24 hour   Intake 3903.75 ml   Output 2165 ml   Net 1738.75 ml       Date 12/04/22 0000 - 12/04/22 2359   Shift 0985-1985 5243-0596 8849-4139 24 Hour Total   INTAKE   I.V.(mL/kg) 638(7.1)   638(7.1)   NG/GT(mL/kg)  4529(28.1)  6692(61.7)   IV Piggyback(mL/kg) 323(3.6)   323(3.6)   Shift Total(mL/kg) 961(10.7) 4056(85.9)  4350(68)   OUTPUT   Urine(mL/kg/hr) 1030(1.4) 200  1230   Emesis/NG output(mL/kg)  0(0)  0(0)   Shift Total(mL/kg) 1030(11.5) 200(2.2)  1230(13.7)   Weight (kg) 89.8 89.8 89.8 89.8       Wt Readings from Last 3 Encounters:   12/03/22 197 lb 15.6 oz (89.8 kg)     Body mass index is 25.42 kg/m². PHYSICAL EXAM:  Constitutional: Intubated and sedated  EENT: PERRLA  Neck: Supple, symmetrical, trachea midline,   Respiratory: Bilateral normal vesicular breathing  Cardiovascular: regular rate and rhythm, normal S1, S2, no murmur noted and 2+ pulses throughout  Abdomen: soft, nontender, nondistended, no masses or organomegaly  NEUROLOGIC: Intubated and sedated.   Extremities:  peripheral pulses normal, no pedal edema, no clubbing or cyanosis  SKIN: normal coloration and turgor    Any additional physical findings:      MEDICATIONS:  Scheduled Meds:   amiodarone  200 mg Oral Daily    OLANZapine  5 mg Per NG tube Nightly    piperacillin-tazobactam  3,375 mg IntraVENous Q8H    vancomycin (VANCOCIN) intermittent dosing (placeholder)   Other RX Placeholder    vancomycin  1,500 mg IntraVENous Q24H    pantoprazole (PROTONIX) 40 mg injection  40 mg IntraVENous Q12H    insulin glargine  15 Units SubCUTAneous BID    sodium chloride flush  5-40 mL IntraVENous 2 times per day    insulin lispro  0-8 Units SubCUTAneous TID WC    insulin lispro  0-4 Units SubCUTAneous Nightly     Continuous Infusions:   propofol 15 mcg/kg/min (12/04/22 2684)    dexmedetomidine 0.4 mcg/kg/hr (12/04/22 7263)    sodium chloride      sodium chloride Stopped (12/04/22 0422)    fentaNYL 50 mcg/mL Stopped (12/01/22 1309)    norepinephrine Stopped (12/04/22 0422)    sodium chloride 40 mL/hr at 12/04/22 0651    dextrose       PRN Meds:   LORazepam, 1 mg, Q6H PRN  sodium chloride, , PRN  sodium chloride flush, 5-40 mL, PRN  sodium chloride, , PRN  ondansetron, 4 mg, Q8H PRN   Or  ondansetron, 4 mg, Q6H PRN  polyethylene glycol, 17 g, Daily PRN  acetaminophen, 650 mg, Q6H PRN   Or  acetaminophen, 650 mg, Q6H PRN  sodium phosphate IVPB, 10 mmol, PRN   Or  sodium phosphate IVPB, 15 mmol, PRN   Or  sodium phosphate IVPB, 20 mmol, PRN  magnesium sulfate, 2,000 mg, PRN  potassium chloride, 20 mEq, PRN   Or  potassium chloride, 10 mEq, PRN  glucose, 4 tablet, PRN  dextrose bolus, 125 mL, PRN   Or  dextrose bolus, 250 mL, PRN  glucagon (rDNA), 1 mg, PRN  dextrose, , Continuous PRN      SUPPORT DEVICES: [x] Ventilator [] BIPAP  [] Nasal Cannula [] Room Air    VENT SETTINGS (Comprehensive) (if applicable):  Vent Information  Equipment Changed: HME, Expiratory Filter  Ventilator Initiate: Yes  Ventilator Discontinue: Yes  Vent Mode: AC/PRVC  Additional Respiratory Assessments  Heart Rate: 75  Resp: 16  SpO2: 100 %  End Tidal CO2: 29 (%)  Position: Semi-Hardy's  Humidification Source: HME  Cuff Pressure (cm H2O):  (mov)    ABGs:   Lab Results   Component Value Date/Time    FIO2 40.0 12/04/2022 04:34 AM     Lactic Acid: No results found for: LACTA      DATA:  Complete Blood Count:   Recent Labs     12/02/22  0542 12/02/22  0750 12/03/22  1025 12/03/22  1524 12/03/22 2003 12/04/22  0218 12/04/22  0542   WBC 7.5  --  11.4*  --   --   --  8.8   HGB 8.3*   < > 8.0*   < > 7.7* 7.7* 7.7*   MCV 93.3  --  95.2  --   --   --  92.5   *  --  163  --   --   --  169   RBC 2.82*  --  2.69*  --   --   --  2.67*   HCT 26.3*   < > 25.6*   < > 23.7* 24.4* 24.7*   MCH 29.4  --  29.7  --   --   --  28.8   MCHC 31.6  --  31.3  --   --   --  31.2   RDW 16.8*  --  17.5*  --   --   --  17.2* MPV 10.0  --  10.1  --   --   --  9.8    < > = values in this interval not displayed. PT/INR:    Lab Results   Component Value Date/Time    PROTIME 13.4 11/30/2022 04:31 AM    INR 1.3 11/30/2022 04:31 AM     PTT:  No results found for: APTT, PTT    Basal Metabolic Profile:   Recent Labs     12/02/22  0542 12/03/22  1025 12/04/22  0542    137 137   K 4.0 3.8 3.7   BUN 17 20 19   CREATININE 1.02 1.33* 1.21*   * 108* 107   CO2 17* 17* 19*        Magnesium:   Lab Results   Component Value Date/Time    MG 2.0 11/29/2022 10:52 PM     Phosphorus: No results found for: PHOS  S. Calcium:  Recent Labs     12/04/22  0542   CALCIUM 7.8*       S. Ionized Calcium:No results for input(s): IONCA in the last 72 hours. Urinalysis:   Lab Results   Component Value Date/Time    WBCUA 20 TO 50 11/29/2022 01:45 PM    RBCUA 2 TO 5 11/29/2022 01:45 PM       CARDIAC ENZYMES: No results for input(s): CKMB, CKMBINDEX, TROPONINI in the last 72 hours. Invalid input(s): CKTOTAL;3  BNP: No results for input(s): BNP in the last 72 hours. LFTS  No results for input(s): ALKPHOS, ALT, AST, BILITOT, BILIDIR, LABALBU in the last 72 hours. AMYLASE/LIPASE/AMMONIA  No results for input(s): AMYLASE, LIPASE, AMMONIA in the last 72 hours. Last 3 Blood Glucose:   Recent Labs     12/02/22  0542 12/03/22  1025 12/04/22  0542   GLUCOSE 172* 271* 313*        HgBA1c:    Lab Results   Component Value Date/Time    LABA1C 6.1 11/29/2022 02:06 PM         TSH:  No results found for: TSH  ANEMIA STUDIES  No results for input(s): LABIRON, TIBC, FERRITIN, [id number], FOLATE, OCCULTBLD in the last 72 hours.     Cultures during this admission:     Blood cultures:                 [] None drawn      [x] Negative             []  Positive (Details:  )  Urine Culture:                   [] None drawn      [] Negative             []  Positive (Details:  )  Sputum Culture:               [] None drawn       [] Negative             [] Positive (Details:  )   Endotracheal aspirate:     [] None drawn       [] Negative             []  Positive (Details:  )     Chest Xray (12/4/2022):    ASSESSMENT:     1) Hypovolemic shock 2/2 acute blood loss from UGIB  2) CAD s/p CABG 1 month ago  3) Atrial fibrillation on coumadin  4) NSTEMI  5) ERICA 2/2 hypovolemia 2/2 shock and GI bleed  6) HTN   7) HLD  8) Depression   9) Combined acute gapped metabolic acidosis with acute respiratory alkalosis    PLAN:     Neuro  - Patient is intubated and sedated  -Patient is on Propofol and Precedex for sedation  -Zyprexa as needed  -Cont neuro checks as per protocol    CV  -Patient intermittently goes into A.fib with RVR  -Resumed the amiodarone as per the cardio recommendations  -Start Heparin gtt today   -Keep MAP > 65    Resp  -PRVC: 40%/16/650/8  -ABG: pH 7.35; pCO2 35.6; PO2 46.7; HCO3 20.1  -Continue to monitor    GI  - UGI bleed 2/2 duodenal ulcer  -S/p EGD 11/29 --> duodenal ulcer with visible vessel cauterized with bipolar probe, old blood in the stomach.  - GI recs post EGD --> continue with PPI drip for another 24 to 48 hours and then twice daily for 8 weeks.   -S/p 2u pRBC on 11/30/22  - Diet NPO  ADULT TUBE FEEDING; Orogastric; Peptide Based High Protein; Continuous; 40; Yes; 10; Q 4 hours; 60; 30; Q 4 hours  -Cont Protonix IV BID  -Ok to restart anticoagulation for Afib, hemoglobin stable, VSS     /Renal  - Na 137 / K 3.7  - BUN 19/ Cr 1.21  - Urine output appropriate   - Replace electrolytes as needed     Heme  -HgB 7.7(7.7,8.0)   -Monitor H & H  -Transfuse if HB< 8.0    ID  - TMAX 101.3 overnight   - WBC 8.8  - On Zosyn and Vancomycin for  hospital acquired pneumonia  - Treat fever with Tylenol PRN     Endo  - Glucose persistently elevated  - Increase Lantus to 30 units BID   - Continue MDSS   - Continue to monitor closely     DVT Ppx: SCDs, Heparin gtt   GI Ppx: Protonix BID   Diet: Tube feeds    Christy Stevenson MD           Department of Internal Medicine/ Critical care  [facility] ([place]             12/4/2022, 8:27 AM  Attending Physician Statement  I have discussed the case of Albino Govea, including pertinent history and exam findings with the resident/fellow/medical student/NP/PA. I have seen and examined the patient and the key elements of the encounter have been performed by me. I agree with the assessment, plan and orders as documented by the resident/fellow/medical student/NP/PA  With changes made to the note as needed. Pt was seen during rounds. Review of Systems:   In addition to the pertinent positives and negatives as stated within HPI and the review of systems as documented in their notes, all other systems were reviewed when able to and are reported negative. Patient with fever  Having thick yellow secretions  Send off sputum for gram stain culture  On the ventilator on 30% oxygen  ABG without significant acid-base disorder  Blood sugars are increased, increase Lantus insulin  Patient came off the norepinephrine  Fluid balance +4 L  Renal function is showing improvement creatinine  On proton pump inhibitors twice daily  Hemoglobin has been stable  Start heparin infusion as requested by cardiology and continue to monitor hemoglobin  Leukocytosis improving  Chest x-ray with increased bibasilar infiltrates  GI signed off  Patient's family was updated  Total critical care time caring for this patient with life threatening, unstable organ failure, including direct patient contact, management of life support systems, review of data including imaging and labs, discussions with other team members and physicians at least 27  Min so far today, excluding procedures.           Kevin Drew MD  12/4/2022  5:19 PM

## 2022-12-04 NOTE — PLAN OF CARE
Problem: Discharge Planning  Goal: Discharge to home or other facility with appropriate resources  12/4/2022 0829 by Jordan Amin RN  Outcome: Progressing  12/4/2022 0021 by Wilmar Tan RN  Outcome: Progressing  Flowsheets (Taken 12/3/2022 2000)  Discharge to home or other facility with appropriate resources:   Identify barriers to discharge with patient and caregiver   Arrange for needed discharge resources and transportation as appropriate     Problem: Safety - Medical Restraint  Goal: Remains free of injury from restraints (Restraint for Interference with Medical Device)  Description: INTERVENTIONS:  1. Determine that other, less restrictive measures have been tried or would not be effective before applying the restraint  2. Evaluate the patient's condition at the time of restraint application  3. Inform patient/family regarding the reason for restraint  4.  Q2H: Monitor safety, psychosocial status, comfort, nutrition and hydration  12/4/2022 0829 by Jordan Amin RN  Outcome: Progressing  Flowsheets  Taken 12/4/2022 0800 by Jordan Amin RN  Remains free of injury from restraints (restraint for interference with medical device): Every 2 hours: Monitor safety, psychosocial status, comfort, nutrition and hydration  Taken 12/4/2022 0600 by Wilmar Tan RN  Remains free of injury from restraints (restraint for interference with medical device): Determine that other, less restrictive measures have been tried or would not be effective before applying the restraint  Taken 12/4/2022 0400 by Wilmar Tan RN  Remains free of injury from restraints (restraint for interference with medical device):   Determine that other, less restrictive measures have been tried or would not be effective before applying the restraint   Evaluate the patient's condition at the time of restraint application  Taken 07/8/9882 0200 by Wilmar Tan RN  Remains free of injury from restraints (restraint for interference with medical device):   Evaluate the patient's condition at the time of restraint application   Determine that other, less restrictive measures have been tried or would not be effective before applying the restraint   Inform patient/family regarding the reason for restraint  12/4/2022 0021 by Author Lane RN  Outcome: Progressing  Flowsheets  Taken 12/4/2022 0000 by Author Lane RN  Remains free of injury from restraints (restraint for interference with medical device): Determine that other, less restrictive measures have been tried or would not be effective before applying the restraint  Taken 12/3/2022 2200 by Author Lane RN  Remains free of injury from restraints (restraint for interference with medical device):   Determine that other, less restrictive measures have been tried or would not be effective before applying the restraint   Evaluate the patient's condition at the time of restraint application  Taken 43/3/1088 2000 by Author Lane RN  Remains free of injury from restraints (restraint for interference with medical device):   Evaluate the patient's condition at the time of restraint application   Determine that other, less restrictive measures have been tried or would not be effective before applying the restraint  Taken 12/3/2022 1200 by Eloisa Guaman RN  Remains free of injury from restraints (restraint for interference with medical device): Evaluate the patient's condition at the time of restraint application     Problem: Confusion  Goal: Confusion, delirium, dementia, or psychosis is improved or at baseline  Description: INTERVENTIONS:  1. Assess for possible contributors to thought disturbance, including medications, impaired vision or hearing, underlying metabolic abnormalities, dehydration, psychiatric diagnoses, and notify attending LIP  2. [place] high risk fall precautions, as indicated  3. Provide frequent short contacts to provide reality reorientation, refocusing and direction  4. Decrease environmental stimuli, including noise as appropriate  5. Monitor and intervene to maintain adequate nutrition, hydration, elimination, sleep and activity  6. If unable to ensure safety without constant attention obtain sitter and review sitter guidelines with assigned personnel  7.  Initiate Psychosocial CNS and Spiritual Care consult, as indicated  12/4/2022 0829 by Jordi Coburn RN  Outcome: Progressing  Flowsheets (Taken 12/4/2022 0354 by Geovanna Caballero RN)  Effect of thought disturbance (confusion, delirium, dementia, or psychosis) are managed with adequate functional status:   Decrease environmental stimuli, including noise as appropriate   Monitor and intervene to maintain adequate nutrition, hydration, elimination, sleep and activity  12/4/2022 0021 by Geovanna Caballero RN  Outcome: Progressing  Flowsheets (Taken 12/3/2022 2000)  Effect of thought disturbance (confusion, delirium, dementia, or psychosis) are managed with adequate functional status:   Assess for contributors to thought disturbance, including medications, impaired vision or hearing, underlying metabolic abnormalities, dehydration, psychiatric diagnoses, notify [facility] high risk fall precautions, as indicated   Provide frequent short contacts to provide reality reorientation, refocusing and direction   Decrease environmental stimuli, including noise as appropriate   Monitor and intervene to maintain adequate nutrition, hydration, elimination, sleep and activity     Problem: Pain  Goal: Verbalizes/displays adequate comfort level or baseline comfort level  Recent Flowsheet Documentation  Taken 12/4/2022 0600 by Geovanna Caballero RN  Verbalizes/displays adequate comfort level or baseline comfort level:   Assess pain using appropriate pain scale   Administer analgesics based on type and severity of pain and evaluate response  Taken 12/4/2022 0545 by Geovanna Caballero RN  Verbalizes/displays adequate comfort level or baseline comfort level: Assess pain using appropriate pain scale   Administer analgesics based on type and severity of pain and evaluate response   Implement non-pharmacological measures as appropriate and evaluate response  Taken 12/4/2022 0400 by Heber Osorio RN  Verbalizes/displays adequate comfort level or baseline comfort level:   Implement non-pharmacological measures as appropriate and evaluate response   Administer analgesics based on type and severity of pain and evaluate response   Assess pain using appropriate pain scale  Taken 12/4/2022 0200 by Heber Osorio RN  Verbalizes/displays adequate comfort level or baseline comfort level:   Administer analgesics based on type and severity of pain and evaluate response   Assess pain using appropriate pain scale   Implement non-pharmacological measures as appropriate and evaluate response   Notify Licensed Independent Practitioner if interventions unsuccessful or patient reports new pain  12/4/2022 0021 by Heber Osorio RN  Outcome: Progressing  Flowsheets  Taken 12/4/2022 0000  Verbalizes/displays adequate comfort level or baseline comfort level:   Encourage patient to monitor pain and request assistance   Assess pain using appropriate pain scale  Taken 12/3/2022 2230  Verbalizes/displays adequate comfort level or baseline comfort level:   Encourage patient to monitor pain and request assistance   Assess pain using appropriate pain scale   Administer analgesics based on type and severity of pain and evaluate response   Implement non-pharmacological measures as appropriate and evaluate response  Taken 12/3/2022 2030  Verbalizes/displays adequate comfort level or baseline comfort level:   Administer analgesics based on type and severity of pain and evaluate response   Assess pain using appropriate pain scale     Problem: Cardiovascular - Adult  Goal: Maintains optimal cardiac output and hemodynamic stability  Outcome: Progressing  Goal: Absence of cardiac dysrhythmias or at baseline  Outcome: Progressing     Problem: Gastrointestinal - Adult  Goal: Minimal or absence of nausea and vomiting  Outcome: Progressing     Problem: Respiratory - Adult  Goal: Achieves optimal ventilation and oxygenation  12/4/2022 0829 by Javad Vasquez RN  Outcome: Progressing  12/4/2022 0021 by Zak Bonilla RN  Outcome: Progressing  12/3/2022 2057 by Naomi Hernandez RCP  Outcome: Progressing  Flowsheets  Taken 12/3/2022 2057 by Naomi Hernandez RCP  Achieves optimal ventilation and oxygenation:   Assess for changes in respiratory status   Assess the need for suctioning and aspirate as needed   Respiratory therapy support as indicated   Assess for changes in mentation and behavior   Oxygen supplementation based on oxygen saturation or arterial blood gases  Taken 12/3/2022 2000 by Zak Bonilla RN  Achieves optimal ventilation and oxygenation:   Assess for changes in mentation and behavior   Assess for changes in respiratory status   Position to facilitate oxygenation and minimize respiratory effort   Oxygen supplementation based on oxygen saturation or arterial blood gases     Problem: Nutrition Deficit:  Goal: Optimize nutritional status  12/4/2022 0021 by Zak Bonilla RN  Outcome: Progressing  Flowsheets (Taken 12/3/2022 1231 by Randy Carrera, MS, RD, LD)  Nutrient intake appropriate for improving, restoring, or maintaining nutritional needs:   Recommend, monitor, and adjust tube feedings and TPN/PPN based on assessed needs   Assess nutritional status and recommend course of action     Problem: Skin/Tissue Integrity  Goal: Absence of new skin breakdown  Description: 1. Monitor for areas of redness and/or skin breakdown  2. Assess vascular access sites hourly  3. Every 4-6 hours minimum:  Change oxygen saturation probe site  4. Every 4-6 hours:  If on nasal continuous positive airway pressure, respiratory therapy assess nares and determine need for appliance change or resting period.   12/4/2022 4964 by Phuong Molina RN  Outcome: Progressing  12/4/2022 0021 by Theron Mcpherson RN  Outcome: Progressing     Problem: Safety - Adult  Goal: Free from fall injury  12/4/2022 0829 by Phuong Molina RN  Outcome: Progressing  12/4/2022 0021 by Theron Mcpherson RN  Outcome: Progressing     Problem: ABCDS Injury Assessment  Goal: Absence of physical injury  12/4/2022 0829 by Phuong Molina RN  Outcome: Progressing  12/4/2022 0021 by Theron Mcpherson RN  Outcome: Progressing     Problem: Neurosensory - Adult  Goal: Achieves stable or improved neurological status  Outcome: Progressing     Problem: Skin/Tissue Integrity - Adult  Goal: Skin integrity remains intact  Outcome: Progressing  Flowsheets (Taken 12/3/2022 2000 by Theron Mcpherson RN)  Skin Integrity Remains Intact: Monitor for areas of redness and/or skin breakdown

## 2022-12-04 NOTE — PLAN OF CARE
Problem: Safety - Medical Restraint  Goal: Remains free of injury from restraints (Restraint for Interference with Medical Device)  Description: INTERVENTIONS:  1. Determine that other, less restrictive measures have been tried or would not be effective before applying the restraint  2. Evaluate the patient's condition at the time of restraint application  3. Inform patient/family regarding the reason for restraint  4. Q2H: Monitor safety, psychosocial status, comfort, nutrition and hydration  Outcome: Progressing  Flowsheets  Taken 12/4/2022 0000 by Radha Bhatt RN  Remains free of injury from restraints (restraint for interference with medical device): Determine that other, less restrictive measures have been tried or would not be effective before applying the restraint  Taken 12/3/2022 2200 by Radha Bhatt RN  Remains free of injury from restraints (restraint for interference with medical device):   Determine that other, less restrictive measures have been tried or would not be effective before applying the restraint   Evaluate the patient's condition at the time of restraint application  Taken 61/6/5203 2000 by Radha Bhatt RN  Remains free of injury from restraints (restraint for interference with medical device):   Evaluate the patient's condition at the time of restraint application   Determine that other, less restrictive measures have been tried or would not be effective before applying the restraint  Taken 12/3/2022 1200 by Vonnie Johnston RN  Remains free of injury from restraints (restraint for interference with medical device): Evaluate the patient's condition at the time of restraint application     Problem: Confusion  Goal: Confusion, delirium, dementia, or psychosis is improved or at baseline  Description: INTERVENTIONS:  1.  Assess for possible contributors to thought disturbance, including medications, impaired vision or hearing, underlying metabolic abnormalities, dehydration, psychiatric diagnoses, and notify attending LIP  2. [place] high risk fall precautions, as indicated  3. Provide frequent short contacts to provide reality reorientation, refocusing and direction  4. Decrease environmental stimuli, including noise as appropriate  5. Monitor and intervene to maintain adequate nutrition, hydration, elimination, sleep and activity  6. If unable to ensure safety without constant attention obtain sitter and review sitter guidelines with assigned personnel  7.  Initiate Psychosocial CNS and Spiritual Care consult, as indicated  Outcome: Progressing  Flowsheets (Taken 12/3/2022 2000)  Effect of thought disturbance (confusion, delirium, dementia, or psychosis) are managed with adequate functional status:   Assess for contributors to thought disturbance, including medications, impaired vision or hearing, underlying metabolic abnormalities, dehydration, psychiatric diagnoses, notify [facility] high risk fall precautions, as indicated   Provide frequent short contacts to provide reality reorientation, refocusing and direction   Decrease environmental stimuli, including noise as appropriate   Monitor and intervene to maintain adequate nutrition, hydration, elimination, sleep and activity     Problem: Pain  Goal: Verbalizes/displays adequate comfort level or baseline comfort level  Outcome: Progressing  Flowsheets  Taken 12/4/2022 0000  Verbalizes/displays adequate comfort level or baseline comfort level:   Encourage patient to monitor pain and request assistance   Assess pain using appropriate pain scale  Taken 12/3/2022 2230  Verbalizes/displays adequate comfort level or baseline comfort level:   Encourage patient to monitor pain and request assistance   Assess pain using appropriate pain scale   Administer analgesics based on type and severity of pain and evaluate response   Implement non-pharmacological measures as appropriate and evaluate response  Taken 12/3/2022 2030  Verbalizes/displays adequate comfort level or baseline comfort level:   Administer analgesics based on type and severity of pain and evaluate response   Assess pain using appropriate pain scale     Problem: Skin/Tissue Integrity  Goal: Absence of new skin breakdown  Description: 1. Monitor for areas of redness and/or skin breakdown  2. Assess vascular access sites hourly  3. Every 4-6 hours minimum:  Change oxygen saturation probe site  4. Every 4-6 hours:  If on nasal continuous positive airway pressure, respiratory therapy assess nares and determine need for appliance change or resting period.   Outcome: Progressing

## 2022-12-04 NOTE — PLAN OF CARE
Problem: Respiratory - Adult  Goal: Achieves optimal ventilation and oxygenation  12/3/2022 2057 by Purvi Hernandez RCP  Outcome: Progressing  Flowsheets (Taken 12/3/2022 2057)  Achieves optimal ventilation and oxygenation:   Assess for changes in respiratory status   Assess the need for suctioning and aspirate as needed   Respiratory therapy support as indicated   Assess for changes in mentation and behavior   Oxygen supplementation based on oxygen saturation or arterial blood gases

## 2022-12-04 NOTE — PROGRESS NOTES
[facility]   Progress Note                    Date:   12/4/2022  Patient name:  Araseli Baer  Date of admission:  11/29/2022 12:20 PM  MRN:   [id number]  YOB: 1945  PCP:    Yahir Frost    Reason for Admission:  Upper GI bleed [K92.2]    Subjective:        Clinical Changes / Abnormalities:    Patient seen and examined at bedside. No acute events overnight. Remains intubated and sedated  Currently in SR. Urine output in the last 24 hours:     Intake/Output Summary (Last 24 hours) at 12/4/2022 0717  Last data filed at 12/4/2022 0651  Gross per 24 hour   Intake 2924.73 ml   Output 2015 ml   Net 909.73 ml     I/O since admission: +3.2 liters      Medications:   Scheduled Meds:   amiodarone  200 mg Oral Daily    OLANZapine  5 mg Per NG tube Nightly    piperacillin-tazobactam  3,375 mg IntraVENous Q8H    vancomycin (VANCOCIN) intermittent dosing (placeholder)   Other RX Placeholder    vancomycin  1,500 mg IntraVENous Q24H    pantoprazole (PROTONIX) 40 mg injection  40 mg IntraVENous Q12H    insulin glargine  15 Units SubCUTAneous BID    sodium chloride flush  5-40 mL IntraVENous 2 times per day    insulin lispro  0-8 Units SubCUTAneous TID WC    insulin lispro  0-4 Units SubCUTAneous Nightly     Continuous Infusions:   propofol 15 mcg/kg/min (12/04/22 0652)    dexmedetomidine 0.4 mcg/kg/hr (12/04/22 0651)    sodium chloride      sodium chloride Stopped (12/04/22 0422)    fentaNYL 50 mcg/mL Stopped (12/01/22 1309)    norepinephrine Stopped (12/04/22 0422)    sodium chloride 40 mL/hr at 12/04/22 0651    dextrose       CBC:   Recent Labs     12/02/22  0542 12/02/22  0750 12/03/22  1025 12/03/22  1524 12/03/22 2003 12/04/22  0218 12/04/22  0542   WBC 7.5  --  11.4*  --   --   --  8.8   HGB 8.3*   < > 8.0*   < > 7.7* 7.7* 7.7*   *  --  163  --   --   --  169    < > = values in this interval not displayed.      BMP:    Recent Labs     12/02/22  0542 12/03/22  1024 12/04/22  0542    137 137   K 4.0 3.8 3.7   * 108* 107   CO2 17* 17* 19*   BUN 17 20 19   CREATININE 1.02 1.33* 1.21*   GLUCOSE 172* 271* 313*     Hepatic: No results for input(s): AST, ALT, ALB, BILITOT, ALKPHOS in the last 72 hours. Troponin: No results for input(s): TROPONINI in the last 72 hours. No results for input(s): TROPONINT in the last 72 hours. BNP: No results for input(s): PROBNP in the last 72 hours. No results for input(s): BNP in the last 72 hours. Lipids: No results for input(s): CHOL, HDL in the last 72 hours. Invalid input(s): LDLCALCU  INR: No results for input(s): INR in the last 72 hours. Objective:   Vitals: /69   Pulse 73   Temp 98.4 °F (36.9 °C) (Bladder)   Resp 18   Ht 6' 2\" (1.88 m)   Wt 197 lb 15.6 oz (89.8 kg)   SpO2 100%   BMI 25.42 kg/m²    General appearance: Intubated and sedated. HEENT: Head: Normocephalic, no lesions, without obvious abnormality. Neck: no adenopathy, no carotid bruit, no JVD, supple, symmetrical, trachea midline and thyroid not enlarged, symmetric, no tenderness/mass/nodules  Lungs: clear to auscultation bilaterally  Heart: regular rate and rhythm, S1, S2 normal, no murmur, click, rub or gallop  Abdomen: soft, non-tender; bowel sounds normal; no masses,  no organomegaly  Extremities: extremities normal, atraumatic, no cyanosis or edema        DATA:    Diagnostics:       ECHO:   04/2018  The left ventricle is normal size. There is normal left ventricular wall thickness. Left ventricle systolic function is normal.      The Ejection Fraction is 55-60%. Grade I diastolic dysfunction. Cannot rule out anteroapical hypokinesis seen only in apical 4 chamber views although endocardium   was not sen well. Echo 04/2022    Left Ventricle: Left ventricle appears normal in size. Wall thickness   is normal. Systolic function is low normal to mildly decreased with an   ejection fraction of 50-55%.      Left Atrium: Left atrium is normal in size. The left atrial volume   index is 27.1 mL/m2. Right Ventricle: Right ventricular size appears normal. The right   ventricular basal diameter is 35.0 mm. Systolic function is normal.     Mitral Valve: The leaflets are mildly thickened and exhibit normal   excursion. Aortic Valve: The aortic valve is congenitally bicuspid. The leaflets   exhibit normal excursion. There is moderate sclerosis. There is no   regurgitation. There is mild stenosis. The calculated aortic valve area is   1.69 cm2. The calculated aortic valve peak gradient is 11.83 mmHg. The   calculated aortic valve mean gradient is 6.00 mmHg. Assessment / Acute Cardiac Problems:   Shock likely hypovolemic secondary to bleeding duodenal ulcer, currently on pressor support. Status post EGD 11/29/2022 with bipolar cautery of bleeding duodenal ulcer. CAD status post PCI X 3 in 2012 and CABG X2 with LIMA to distal LAD, SVG to OM1 with exploration of distal PDA 10/25/2022. A. fib which was newly diagnosed in November 2022, on Coumadin and amiodarone. Preserved LVEF on echocardiogram as above. Elevated troponin 910 with mild uptrend. Likely secondary to bleed. Acute hypoxic respiratory failure secondary to above. Bicuspid Aortic valve. Hypertension. Hyperlipidemia. Chronic LBBB. Reintubated 12/2 /2022        Plan of Treatment:   Start anticoagulation for atrial fibrillation when okay with primary team and GI  Currently in SR. Continue PO amidarone  Rest per primary team  Currently intubated and sedated. Wean as tolerated  K>4, Mg>2    Wander Stubbs MD  Fellow, [id number] [facility]    Attending note. The patient was seen and examined agree with the evaluation and plan above. Intubated and sedated. We will resume anticoagulation with okay with the primary service. Currently on p.o. amiodarone. Normal sinus rhythm. Continue supportive therapy.   We will follow as needed.

## 2022-12-04 NOTE — PROGRESS NOTES
[id number] Pharmacokinetic Monitoring Service - Vancomycin    Consulting Provider: Dr. Kadie Barry   Indication: pneumonia  Target Concentration: Goal AUC/MARIO 400-600 mg*hr/L  Day of Therapy: 3  Additional Antimicrobials: zosyn    Pertinent Laboratory Values: Wt Readings from Last 1 Encounters:   12/03/22 197 lb 15.6 oz (89.8 kg)     Temp Readings from Last 1 Encounters:   12/04/22 99.3 °F (37.4 °C) (Bladder)     Estimated Creatinine Clearance: 59 mL/min (A) (based on SCr of 1.21 mg/dL (H)).   Recent Labs     12/03/22  1025 12/04/22  0542   CREATININE 1.33* 1.21*   WBC 11.4* 8.8       Pertinent Cultures:  Culture Date Source Results   11/29 Blood x2 No growth   11/29 Urine No growth   11/29 MRSA swab Negative   12/03 Blood x2 No growth at 12 h      Assessment:  Date/Time Current Dose Concentration Timing of Concentration (h)   12/04 0540 1500 mg q24h 11.2 ~7 h after last dose   Note: Serum concentrations collected for AUC dosing may appear elevated if collected in close proximity to the dose administered, this is not necessarily an indication of toxicity    Plan:  SCr slightly trending down (baseline SCr~1.3-1.6); urine output has been adequate  Based on level assessment current dosing will reach therapeutic AUC of 490 and trough of ~15 at steady state, continue Vancomycin 1500 mg IV q24h    Pharmacy will continue to monitor patient and adjust therapy as indicated    Thank you for the consult,  Jannet Stack, PharmD, 12/4/2022 12:07 PM

## 2022-12-04 NOTE — PLAN OF CARE
Problem: Respiratory - Adult  Goal: Achieves optimal ventilation and oxygenation  12/4/2022 0905 by Richard Fothergill, RCP  Outcome: Progressing  12/4/2022 0829 by Lm Tariq RN  Outcome: Progressing  12/4/2022 0021 by Tanya Lam RN  Outcome: Progressing  12/3/2022 2057 by Gilmer Hernandez RCP  Outcome: Progressing  Flowsheets  Taken 12/3/2022 2057 by Gilmer Hernandez RCP  Achieves optimal ventilation and oxygenation:   Assess for changes in respiratory status   Assess the need for suctioning and aspirate as needed   Respiratory therapy support as indicated   Assess for changes in mentation and behavior   Oxygen supplementation based on oxygen saturation or arterial blood gases  Taken 12/3/2022 2000 by Tanya Lam RN  Achieves optimal ventilation and oxygenation:   Assess for changes in mentation and behavior   Assess for changes in respiratory status   Position to facilitate oxygenation and minimize respiratory effort   Oxygen supplementation based on oxygen saturation or arterial blood gases

## 2022-12-05 LAB
ALLEN TEST: POSITIVE
ANION GAP SERPL CALCULATED.3IONS-SCNC: 8 MMOL/L (ref 9–17)
BUN BLDV-MCNC: 22 MG/DL (ref 8–23)
CALCIUM IONIZED: 1.14 MMOL/L (ref 1.13–1.33)
CALCIUM SERPL-MCNC: 7.7 MG/DL (ref 8.6–10.4)
CHLORIDE BLD-SCNC: 111 MMOL/L (ref 98–107)
CO2: 23 MMOL/L (ref 20–31)
CREAT SERPL-MCNC: 0.95 MG/DL (ref 0.7–1.2)
DIRECT EXAM: ABNORMAL
FIO2: 30
GFR SERPL CREATININE-BSD FRML MDRD: >60 ML/MIN/1.73M2
GLUCOSE BLD-MCNC: 112 MG/DL (ref 75–110)
GLUCOSE BLD-MCNC: 211 MG/DL (ref 75–110)
GLUCOSE BLD-MCNC: 255 MG/DL (ref 75–110)
GLUCOSE BLD-MCNC: 277 MG/DL (ref 70–99)
GLUCOSE BLD-MCNC: 285 MG/DL (ref 74–100)
GLUCOSE BLD-MCNC: 319 MG/DL (ref 75–110)
HCO3 VENOUS: 22.6 MMOL/L (ref 22–29)
HCT VFR BLD CALC: 23.9 % (ref 40.7–50.3)
HCT VFR BLD CALC: 24.9 % (ref 40.7–50.3)
HCT VFR BLD CALC: 25.4 % (ref 40.7–50.3)
HCT VFR BLD CALC: 27 % (ref 40.7–50.3)
HEMOGLOBIN: 7.5 G/DL (ref 13–17)
HEMOGLOBIN: 7.7 G/DL (ref 13–17)
HEMOGLOBIN: 8.1 G/DL (ref 13–17)
HEMOGLOBIN: 8.4 G/DL (ref 13–17)
MODE: ABNORMAL
NEGATIVE BASE EXCESS, VEN: 2 (ref 0–2)
O2 DEVICE/FLOW/%: ABNORMAL
O2 SAT, VEN: 79 % (ref 60–85)
PARTIAL THROMBOPLASTIN TIME: 43.2 SEC (ref 20.5–30.5)
PARTIAL THROMBOPLASTIN TIME: 44.1 SEC (ref 20.5–30.5)
PARTIAL THROMBOPLASTIN TIME: 45.8 SEC (ref 20.5–30.5)
PCO2, VEN: 38.8 MM HG (ref 41–51)
PH VENOUS: 7.37 (ref 7.32–7.43)
PO2, VEN: 44.7 MM HG (ref 30–50)
POTASSIUM SERPL-SCNC: 3.7 MMOL/L (ref 3.7–5.3)
SAMPLE SITE: ABNORMAL
SODIUM BLD-SCNC: 142 MMOL/L (ref 135–144)
SPECIMEN DESCRIPTION: ABNORMAL

## 2022-12-05 PROCEDURE — 97530 THERAPEUTIC ACTIVITIES: CPT

## 2022-12-05 PROCEDURE — 82330 ASSAY OF CALCIUM: CPT

## 2022-12-05 PROCEDURE — 6360000002 HC RX W HCPCS

## 2022-12-05 PROCEDURE — 6370000000 HC RX 637 (ALT 250 FOR IP)

## 2022-12-05 PROCEDURE — 2500000003 HC RX 250 WO HCPCS: Performed by: HEALTH CARE PROVIDER

## 2022-12-05 PROCEDURE — 82803 BLOOD GASES ANY COMBINATION: CPT

## 2022-12-05 PROCEDURE — 2580000003 HC RX 258: Performed by: HEALTH CARE PROVIDER

## 2022-12-05 PROCEDURE — 93306 TTE W/DOPPLER COMPLETE: CPT

## 2022-12-05 PROCEDURE — C9113 INJ PANTOPRAZOLE SODIUM, VIA: HCPCS | Performed by: STUDENT IN AN ORGANIZED HEALTH CARE EDUCATION/TRAINING PROGRAM

## 2022-12-05 PROCEDURE — 2580000003 HC RX 258: Performed by: STUDENT IN AN ORGANIZED HEALTH CARE EDUCATION/TRAINING PROGRAM

## 2022-12-05 PROCEDURE — 82947 ASSAY GLUCOSE BLOOD QUANT: CPT

## 2022-12-05 PROCEDURE — 94003 VENT MGMT INPAT SUBQ DAY: CPT

## 2022-12-05 PROCEDURE — 94761 N-INVAS EAR/PLS OXIMETRY MLT: CPT

## 2022-12-05 PROCEDURE — 2000000000 HC ICU R&B

## 2022-12-05 PROCEDURE — 85018 HEMOGLOBIN: CPT

## 2022-12-05 PROCEDURE — 36415 COLL VENOUS BLD VENIPUNCTURE: CPT

## 2022-12-05 PROCEDURE — 36600 WITHDRAWAL OF ARTERIAL BLOOD: CPT

## 2022-12-05 PROCEDURE — 97166 OT EVAL MOD COMPLEX 45 MIN: CPT

## 2022-12-05 PROCEDURE — 85014 HEMATOCRIT: CPT

## 2022-12-05 PROCEDURE — 2700000000 HC OXYGEN THERAPY PER DAY

## 2022-12-05 PROCEDURE — 6360000002 HC RX W HCPCS: Performed by: HEALTH CARE PROVIDER

## 2022-12-05 PROCEDURE — 99291 CRITICAL CARE FIRST HOUR: CPT | Performed by: INTERNAL MEDICINE

## 2022-12-05 PROCEDURE — 6370000000 HC RX 637 (ALT 250 FOR IP): Performed by: STUDENT IN AN ORGANIZED HEALTH CARE EDUCATION/TRAINING PROGRAM

## 2022-12-05 PROCEDURE — 80048 BASIC METABOLIC PNL TOTAL CA: CPT

## 2022-12-05 PROCEDURE — 6360000002 HC RX W HCPCS: Performed by: STUDENT IN AN ORGANIZED HEALTH CARE EDUCATION/TRAINING PROGRAM

## 2022-12-05 PROCEDURE — 85730 THROMBOPLASTIN TIME PARTIAL: CPT

## 2022-12-05 PROCEDURE — 97535 SELF CARE MNGMENT TRAINING: CPT

## 2022-12-05 RX ORDER — FUROSEMIDE 10 MG/ML
INJECTION INTRAMUSCULAR; INTRAVENOUS
Status: COMPLETED
Start: 2022-12-05 | End: 2022-12-05

## 2022-12-05 RX ORDER — FUROSEMIDE 10 MG/ML
20 INJECTION INTRAMUSCULAR; INTRAVENOUS ONCE
Status: COMPLETED | OUTPATIENT
Start: 2022-12-05 | End: 2022-12-05

## 2022-12-05 RX ORDER — FENTANYL CITRATE 50 UG/ML
50 INJECTION, SOLUTION INTRAMUSCULAR; INTRAVENOUS
Status: DISCONTINUED | OUTPATIENT
Start: 2022-12-05 | End: 2022-12-08

## 2022-12-05 RX ORDER — ASPIRIN 81 MG/1
81 TABLET, CHEWABLE ORAL DAILY
Status: DISCONTINUED | OUTPATIENT
Start: 2022-12-06 | End: 2022-12-14 | Stop reason: HOSPADM

## 2022-12-05 RX ORDER — FENTANYL CITRATE 50 UG/ML
INJECTION, SOLUTION INTRAMUSCULAR; INTRAVENOUS
Status: COMPLETED
Start: 2022-12-05 | End: 2022-12-05

## 2022-12-05 RX ORDER — BACITRACIN, NEOMYCIN, POLYMYXIN B 400; 3.5; 5 [USP'U]/G; MG/G; [USP'U]/G
OINTMENT TOPICAL 2 TIMES DAILY
Status: DISCONTINUED | OUTPATIENT
Start: 2022-12-05 | End: 2022-12-14 | Stop reason: HOSPADM

## 2022-12-05 RX ADMIN — INSULIN LISPRO 4 UNITS: 100 INJECTION, SOLUTION INTRAVENOUS; SUBCUTANEOUS at 04:40

## 2022-12-05 RX ADMIN — SODIUM CHLORIDE, PRESERVATIVE FREE 10 ML: 5 INJECTION INTRAVENOUS at 20:00

## 2022-12-05 RX ADMIN — PIPERACILLIN AND TAZOBACTAM 3375 MG: 3; .375 INJECTION, POWDER, FOR SOLUTION INTRAVENOUS at 12:22

## 2022-12-05 RX ADMIN — Medication 1500 MG: at 23:47

## 2022-12-05 RX ADMIN — DEXMEDETOMIDINE HYDROCHLORIDE 1 MCG/KG/HR: 4 INJECTION, SOLUTION INTRAVENOUS at 19:01

## 2022-12-05 RX ADMIN — HEPARIN SODIUM 15 UNITS/KG/HR: 10000 INJECTION, SOLUTION INTRAVENOUS at 10:06

## 2022-12-05 RX ADMIN — POLYMYXIN B SULFATE, BACITRACIN ZINC, NEOMYCIN SULFATE: 5000; 3.5; 4 OINTMENT TOPICAL at 19:59

## 2022-12-05 RX ADMIN — SODIUM CHLORIDE, PRESERVATIVE FREE 10 ML: 5 INJECTION INTRAVENOUS at 08:02

## 2022-12-05 RX ADMIN — INSULIN LISPRO 2 UNITS: 100 INJECTION, SOLUTION INTRAVENOUS; SUBCUTANEOUS at 00:15

## 2022-12-05 RX ADMIN — HEPARIN SODIUM 2000 UNITS: 1000 INJECTION INTRAVENOUS; SUBCUTANEOUS at 00:43

## 2022-12-05 RX ADMIN — SODIUM CHLORIDE, PRESERVATIVE FREE 40 MG: 5 INJECTION INTRAVENOUS at 04:32

## 2022-12-05 RX ADMIN — DEXMEDETOMIDINE HYDROCHLORIDE 1 MCG/KG/HR: 4 INJECTION, SOLUTION INTRAVENOUS at 14:43

## 2022-12-05 RX ADMIN — DEXMEDETOMIDINE HYDROCHLORIDE 0.8 MCG/KG/HR: 4 INJECTION, SOLUTION INTRAVENOUS at 01:28

## 2022-12-05 RX ADMIN — INSULIN LISPRO 2 UNITS: 100 INJECTION, SOLUTION INTRAVENOUS; SUBCUTANEOUS at 16:07

## 2022-12-05 RX ADMIN — SODIUM CHLORIDE, PRESERVATIVE FREE 40 MG: 5 INJECTION INTRAVENOUS at 14:43

## 2022-12-05 RX ADMIN — PIPERACILLIN AND TAZOBACTAM 3375 MG: 3; .375 INJECTION, POWDER, FOR SOLUTION INTRAVENOUS at 21:31

## 2022-12-05 RX ADMIN — AMIODARONE HYDROCHLORIDE 200 MG: 200 TABLET ORAL at 08:54

## 2022-12-05 RX ADMIN — PROPOFOL 10 MCG/KG/MIN: 10 INJECTION, EMULSION INTRAVENOUS at 04:36

## 2022-12-05 RX ADMIN — INSULIN LISPRO 6 UNITS: 100 INJECTION, SOLUTION INTRAVENOUS; SUBCUTANEOUS at 12:22

## 2022-12-05 RX ADMIN — FENTANYL CITRATE 50 MCG: 50 INJECTION, SOLUTION INTRAMUSCULAR; INTRAVENOUS at 04:32

## 2022-12-05 RX ADMIN — DEXMEDETOMIDINE HYDROCHLORIDE 1 MCG/KG/HR: 4 INJECTION, SOLUTION INTRAVENOUS at 06:00

## 2022-12-05 RX ADMIN — POLYMYXIN B SULFATE, BACITRACIN ZINC, NEOMYCIN SULFATE: 5000; 3.5; 4 OINTMENT TOPICAL at 12:23

## 2022-12-05 RX ADMIN — HEPARIN SODIUM 2000 UNITS: 1000 INJECTION INTRAVENOUS; SUBCUTANEOUS at 10:09

## 2022-12-05 RX ADMIN — FUROSEMIDE 20 MG: 10 INJECTION INTRAMUSCULAR; INTRAVENOUS at 10:51

## 2022-12-05 RX ADMIN — FUROSEMIDE 20 MG: 10 INJECTION, SOLUTION INTRAMUSCULAR; INTRAVENOUS at 10:51

## 2022-12-05 RX ADMIN — INSULIN GLARGINE 20 UNITS: 100 INJECTION, SOLUTION SUBCUTANEOUS at 08:26

## 2022-12-05 RX ADMIN — PIPERACILLIN AND TAZOBACTAM 3375 MG: 3; .375 INJECTION, POWDER, FOR SOLUTION INTRAVENOUS at 04:34

## 2022-12-05 RX ADMIN — DEXMEDETOMIDINE HYDROCHLORIDE 1 MCG/KG/HR: 4 INJECTION, SOLUTION INTRAVENOUS at 23:21

## 2022-12-05 RX ADMIN — INSULIN LISPRO 4 UNITS: 100 INJECTION, SOLUTION INTRAVENOUS; SUBCUTANEOUS at 08:26

## 2022-12-05 RX ADMIN — DEXMEDETOMIDINE HYDROCHLORIDE 1.2 MCG/KG/HR: 4 INJECTION, SOLUTION INTRAVENOUS at 10:27

## 2022-12-05 ASSESSMENT — PAIN SCALES - PAIN ASSESSMENT IN ADVANCED DEMENTIA (PAINAD)
BREATHING: 0
FACIALEXPRESSION: 0
NEGVOCALIZATION: 0
TOTALSCORE: 0
BODYLANGUAGE: 0
CONSOLABILITY: 0

## 2022-12-05 ASSESSMENT — PULMONARY FUNCTION TESTS
PIF_VALUE: 25
PIF_VALUE: 11

## 2022-12-05 ASSESSMENT — PAIN SCALES - GENERAL
PAINLEVEL_OUTOF10: 3
PAINLEVEL_OUTOF10: 4

## 2022-12-05 NOTE — PLAN OF CARE
Problem: Discharge Planning  Goal: Discharge to home or other facility with appropriate resources  12/4/2022 1959 by Megan Jane RN  Outcome: Progressing     Problem: Safety - Medical Restraint  Goal: Remains free of injury from restraints (Restraint for Interference with Medical Device)  Description: INTERVENTIONS:  1. Determine that other, less restrictive measures have been tried or would not be effective before applying the restraint  2. Evaluate the patient's condition at the time of restraint application  3. Inform patient/family regarding the reason for restraint  4.  Q2H: Monitor safety, psychosocial status, comfort, nutrition and hydration  12/4/2022 1959 by Megan Jane RN  Outcome: Progressing  Flowsheets  Problem: Pain  Goal: Verbalizes/displays adequate comfort level or baseline comfort level  Outcome: Progressing  Flowsheets (Taken 12/4/2022 0600 by Evelyn Haskins RN)  Verbalizes/displays adequate comfort level or baseline comfort level:   Assess pain using appropriate pain scale   Administer analgesics based on type and severity of pain and evaluate response     Problem: Cardiovascular - Adult  Goal: Maintains optimal cardiac output and hemodynamic stability  12/4/2022 1959 by Megan Jane RN  Outcome: Progressing     Problem: Cardiovascular - Adult  Goal: Absence of cardiac dysrhythmias or at baseline  12/4/2022 1959 by Megan Jane RN  Outcome: Progressing     Problem: Gastrointestinal - Adult  Goal: Minimal or absence of nausea and vomiting  12/4/2022 1959 by Megan Jane RN  Outcome: Progressing     Problem: Gastrointestinal - Adult  Goal: Maintains or returns to baseline bowel function  Outcome: Progressing     Problem: Gastrointestinal - Adult  Goal: Maintains adequate nutritional intake  Outcome: Progressing     Problem: Gastrointestinal - Adult  Goal: Establish and maintain optimal ostomy function  Outcome: Progressing     Problem: Respiratory - Adult  Goal: Achieves optimal ventilation and oxygenation  12/4/2022 1959 by Aviva Shine RN  Outcome: Progressing     Problem: Nutrition Deficit:  Goal: Optimize nutritional status  Outcome: Progressing     Problem: Skin/Tissue Integrity  Goal: Absence of new skin breakdown  Description: 1. Monitor for areas of redness and/or skin breakdown  2. Assess vascular access sites hourly  3. Every 4-6 hours minimum:  Change oxygen saturation probe site  4. Every 4-6 hours:  If on nasal continuous positive airway pressure, respiratory therapy assess nares and determine need for appliance change or resting period. 12/4/2022 1959 by Aviva Shine RN  Outcome: Progressing     Problem: Safety - Adult  Goal: Free from fall injury  12/4/2022 1959 by Aviva Shine RN  Outcome: Progressing     Problem: ABCDS Injury Assessment  Goal: Absence of physical injury  12/4/2022 1959 by Aviva Shine RN  Outcome: Progressing     Problem: Neurosensory - Adult  Goal: Achieves stable or improved neurological status  12/4/2022 1959 by Aviva Shine RN  Outcome: Progressing     Problem: Skin/Tissue Integrity - Adult  Goal: Skin integrity remains intact  12/4/2022 1959 by Aviva Shine RN  Outcome: Progressing     Problem: Skin/Tissue Integrity - Adult  Goal: Incisions, wounds, or drain sites healing without S/S of infection  Outcome: Progressing     Problem: Skin/Tissue Integrity - Adult  Goal: Oral mucous membranes remain intact  Outcome: Progressing        Problem: Confusion  Goal: Confusion, delirium, dementia, or psychosis is improved or at baseline  Description: INTERVENTIONS:  1. Assess for possible contributors to thought disturbance, including medications, impaired vision or hearing, underlying metabolic abnormalities, dehydration, psychiatric diagnoses, and notify attending LIP  2. [place] high risk fall precautions, as indicated  3. Provide frequent short contacts to provide reality reorientation, refocusing and direction  4. Decrease environmental stimuli, including noise as appropriate  5. Monitor and intervene to maintain adequate nutrition, hydration, elimination, sleep and activity  6. If unable to ensure safety without constant attention obtain sitter and review sitter guidelines with assigned personnel  7.  Initiate Psychosocial CNS and Spiritual Care consult, as indicated  12/4/2022 1959 by Nayla Rodríguez RN  Outcome: Progressing

## 2022-12-05 NOTE — PROGRESS NOTES
Critical Care Team - Daily Progress Note      Date and time: 12/5/2022 7:42 AM  Patient's name:  Dalila Coronado  Medical Record Number: [id number]  Patient's account/billing number: [de-identified]  Patient's YOB: 1945  Age: 68 y.o. Date of Admission: 11/29/2022 12:20 PM  Length of stay during current admission: 6      Primary Care Physician: Claritza Lock  ICU Attending Physician: Dr. Jt Garcia Status: Full Code    Reason for ICU admission: No chief complaint on file. Dalila Coronado is a 68 y.o. presented from [facility] with past medical history significant for CAD s/p CABG 10/25/2022, atrial fibrillation on Coumadin, diabetes, , HTN, HLD, depression. Presented for painless rectal bleeding multiple times with hg dropped from 8.6 to 6.8. received 2 units PRBC, 2 units FFP, and 1 unit cryoprecipitate as well as TXA, 10 mg of vitamin K.and 2000 units Kcentra for INR of 3.09. EGD 11/28/2022 and found to have a duodenal ulcer with duodenitis but no active bleeding. He had a colonoscopy with reports of no source of bleeding found. According to the chart of transfer, patient had multiple episodes of hematemesis and the repeat EGD showed old blood and no active duodenal ulcer treated with epinephrine. Patient was intebated and transfereed to [facility] for higher care. SUBJECTIVE:     OVERNIGHT EVENTS:         No acute events   Afebrile   MAP 70/80s, not requiring pressure support  Propofot/precedex for sedation     Mentation improved,following commands.     GI   No BM overnight,last on 12/3 and was black/tarry, unchanged from previous BM     HgB 7.7 (7.5, 7.9, 7.4, 7.6)   Continue Protonix 40mg BID     PRVC: 30%/16/650/8  ABG: pH 7.37; pCO2 38.8; PO2 44.7; HCO3 22.6    Continue Amiodarone for Afib w/ RVR   Continue Heparin gtt     On Zosyn and Vancomycin for hospital acquired pneumonia  WBC 8.8  Sputum gram stain 12/4/22: mixed bacterial morphotypes    Na 142 / K 3.7   BUN 22/ Cr 1.21  Ca 7.7, ionized pending   UOP: 96 ml/hr overnight; 2L out last 24 hrs     BG elevated 255  Lantus 20 BID (home dose)  MDSS     Today:  Wean vent  Wean sedation   Continue heparin gtt and amiodarone for Afib   Swallow study following extubation   Monitor BG and adjust insulin as needed    AWAKE & FOLLOWING COMMANDS:  [x] No   [] Yes    CURRENT VENTILATION STATUS:     [x] Ventilator  [] BIPAP  [] Nasal Cannula [] Room Air      IF INTUBATED, ET TUBE MARKING AT LOWER LIP:       cms    SECRETIONS Amount:  [x] Small [] Moderate  [] Large  [] None  Color:     [] White [] Colored  [] Bloody    SEDATION:  RAAS Score:  [x] Propofol gtt  [] Versed gtt  [] Ativan gtt   [] No Sedation    PARALYZED:  [x] No    [] Yes    DIARRHEA:                [x] No                [] Yes  (C. Difficile status: [] positive                                                                                                                       [] negative                                                                                                                     [] pending)    VASOPRESSORS:  [x] No    [] Yes    If yes -   [] Levophed       [] Dopamine     [] Vasopressin       [] Dobutamine  [] Phenylephrine         [] Epinephrine    CENTRAL LINES:     [] No   [x] Yes   (Date of Insertion:   2022 )           If yes -     [] Right IJ     [] Left IJ [] Right Femoral [] Left Femoral                   [] Right Subclavian [] Left Subclavian       BENNETT'S CATHETER:   [] No   [x] Yes  (Date of Insertion:   )     URINE OUTPUT:            [x] Good   [] Low              [] Anuric      OBJECTIVE:     VITAL SIGNS:  BP (!) 116/56   Pulse 57   Temp 97.7 °F (36.5 °C) (Bladder)   Resp 16   Ht 6' 2\" (1.88 m)   Wt 210 lb 5.1 oz (95.4 kg)   SpO2 97%   BMI 27.00 kg/m²   Tmax over 24 hours:  Temp (24hrs), Av °F (36.7 °C), Min:97.3 °F (36.3 °C), Max:99.3 °F (37.4 °C)      Patient Vitals for the past 8 hrs:   BP Temp Temp src Pulse Resp SpO2 Weight 12/05/22 0600 (!) 116/56 97.7 °F (36.5 °C) Bladder 57 -- 97 % 210 lb 5.1 oz (95.4 kg)   12/05/22 0500 (!) 135/55 -- -- 56 -- 100 % --   12/05/22 0400 (!) 131/52 97.5 °F (36.4 °C) Bladder 64 -- 100 % --   12/05/22 0305 -- -- -- 55 16 100 % --   12/05/22 0300 (!) 118/53 -- -- 55 -- 100 % --   12/05/22 0200 (!) 131/58 97.3 °F (36.3 °C) Bladder 58 -- 100 % --   12/05/22 0100 (!) 137/59 -- -- 60 -- 100 % --   12/05/22 0000 138/64 97.3 °F (36.3 °C) Bladder 58 -- 100 % --   12/04/22 2349 -- -- -- 53 16 100 % --           Intake/Output Summary (Last 24 hours) at 12/5/2022 0742  Last data filed at 12/5/2022 0415  Gross per 24 hour   Intake 4665.06 ml   Output 2045 ml   Net 2620.06 ml       Date 12/05/22 0000 - 12/05/22 2359   Shift 4277-7820 1308-0963 5008-6653 24 Hour Total   INTAKE   I.V.(mL/kg) 723(7.6)   723(7.6)   NG/GT(mL/kg) 516(5.4)   516(5.4)   IV Piggyback(mL/kg) 258(2.7)   258(2.7)   Shift Total(mL/kg) 1496. 9(15.7)   1496. 9(15.7)   OUTPUT   Urine(mL/kg/hr) 580   580   Shift Total(mL/kg) 580(6.1)   580(6.1)   Weight (kg) 95.4 95.4 95.4 95.4       Wt Readings from Last 3 Encounters:   12/05/22 210 lb 5.1 oz (95.4 kg)     Body mass index is 27 kg/m². PHYSICAL EXAM:  Constitutional: Intubated and sedated  EENT: PERRLA  Neck: Supple, symmetrical, trachea midline,   Respiratory: Bilateral normal vesicular breathing  Cardiovascular: regular rate and rhythm, normal S1, S2, no murmur noted and 2+ pulses throughout  Abdomen: soft, nontender, nondistended, no masses or organomegaly  NEUROLOGIC: Intubated and sedated.   Extremities:  peripheral pulses normal, no pedal edema, no clubbing or cyanosis  SKIN: normal coloration and turgor    Any additional physical findings:      MEDICATIONS:  Scheduled Meds:   insulin glargine  20 Units SubCUTAneous BID    insulin lispro  0-8 Units SubCUTAneous Q4H    amiodarone  200 mg Oral Daily    OLANZapine  5 mg Per NG tube Nightly    piperacillin-tazobactam  3,375 mg IntraVENous Q8H vancomycin (VANCOCIN) intermittent dosing (placeholder)   Other RX Placeholder    vancomycin  1,500 mg IntraVENous Q24H    pantoprazole (PROTONIX) 40 mg injection  40 mg IntraVENous Q12H    sodium chloride flush  5-40 mL IntraVENous 2 times per day     Continuous Infusions:   heparin (PORCINE) Infusion 15 Units/kg/hr (12/05/22 4443)    propofol 10 mcg/kg/min (12/05/22 5736)    dexmedetomidine 1.2 mcg/kg/hr (12/05/22 4394)    sodium chloride      sodium chloride Stopped (12/05/22 0434)    fentaNYL 50 mcg/mL Stopped (12/01/22 1309)    norepinephrine Stopped (12/04/22 0422)    sodium chloride 50 mL/hr at 12/05/22 0652    dextrose       PRN Meds:   fentanNYL, 50 mcg, Q1H PRN  heparin (porcine), 4,000 Units, PRN  heparin (porcine), 2,000 Units, PRN  LORazepam, 1 mg, Q6H PRN  sodium chloride, , PRN  sodium chloride flush, 5-40 mL, PRN  sodium chloride, , PRN  ondansetron, 4 mg, Q8H PRN   Or  ondansetron, 4 mg, Q6H PRN  polyethylene glycol, 17 g, Daily PRN  acetaminophen, 650 mg, Q6H PRN   Or  acetaminophen, 650 mg, Q6H PRN  sodium phosphate IVPB, 10 mmol, PRN   Or  sodium phosphate IVPB, 15 mmol, PRN   Or  sodium phosphate IVPB, 20 mmol, PRN  magnesium sulfate, 2,000 mg, PRN  potassium chloride, 20 mEq, PRN   Or  potassium chloride, 10 mEq, PRN  glucose, 4 tablet, PRN  dextrose bolus, 125 mL, PRN   Or  dextrose bolus, 250 mL, PRN  glucagon (rDNA), 1 mg, PRN  dextrose, , Continuous PRN      SUPPORT DEVICES: [x] Ventilator [] BIPAP  [] Nasal Cannula [] Room Air    VENT SETTINGS (Comprehensive) (if applicable):  Vent Information  Equipment Changed: Expiratory Filter, Suction catheter, HME  Ventilator Initiate: Yes  Ventilator Discontinue: Yes  Vent Mode: AC/PRVC  Additional Respiratory Assessments  Heart Rate: 57  Resp: 16  SpO2: 97 %  End Tidal CO2: 29 (%)  Position: Semi-Hardy's  Humidification Source: HME  Cuff Pressure (cm H2O):  (mov)    ABGs:   Lab Results   Component Value Date/Time    FIO2 30.0 12/05/2022 04:31 AM     Lactic Acid: No results found for: LACTA      DATA:  Complete Blood Count:   Recent Labs     12/03/22  1025 12/03/22  1524 12/04/22  0542 12/04/22  0949 12/04/22  1217 12/04/22  1749 12/04/22  2333   WBC 11.4*  --  8.8  --  7.5  --   --    HGB 8.0*   < > 7.7*   < > 7.4* 7.9* 7.5*   MCV 95.2  --  92.5  --  97.2  --   --      --  169  --  172  --   --    RBC 2.69*  --  2.67*  --  2.52*  --   --    HCT 25.6*   < > 24.7*   < > 24.5* 25.5* 23.9*   MCH 29.7  --  28.8  --  29.4  --   --    MCHC 31.3  --  31.2  --  30.2  --   --    RDW 17.5*  --  17.2*  --  17.2*  --   --    MPV 10.1  --  9.8  --  9.7  --   --     < > = values in this interval not displayed. PT/INR:    Lab Results   Component Value Date/Time    PROTIME 13.4 11/30/2022 04:31 AM    INR 1.3 11/30/2022 04:31 AM     PTT:    Lab Results   Component Value Date/Time    APTT 43.2 12/04/2022 11:39 PM       Basal Metabolic Profile:   Recent Labs     12/03/22  1025 12/04/22  0542    137   K 3.8 3.7   BUN 20 19   CREATININE 1.33* 1.21*   * 107   CO2 17* 19*        Magnesium:   Lab Results   Component Value Date/Time    MG 2.0 11/29/2022 10:52 PM     Phosphorus: No results found for: PHOS  S. Calcium:  Recent Labs     12/04/22  0542   CALCIUM 7.8*       S. Ionized Calcium:No results for input(s): IONCA in the last 72 hours. Urinalysis:   Lab Results   Component Value Date/Time    WBCUA 20 TO 50 11/29/2022 01:45 PM    RBCUA 2 TO 5 11/29/2022 01:45 PM       CARDIAC ENZYMES: No results for input(s): CKMB, CKMBINDEX, TROPONINI in the last 72 hours. Invalid input(s): CKTOTAL;3  BNP: No results for input(s): BNP in the last 72 hours. LFTS  No results for input(s): ALKPHOS, ALT, AST, BILITOT, BILIDIR, LABALBU in the last 72 hours. AMYLASE/LIPASE/AMMONIA  No results for input(s): AMYLASE, LIPASE, AMMONIA in the last 72 hours.       Last 3 Blood Glucose:   Recent Labs     12/03/22  1025 12/04/22  0542   GLUCOSE 271* 313* HgBA1c:    Lab Results   Component Value Date/Time    LABA1C 6.1 11/29/2022 02:06 PM         TSH:  No results found for: TSH  ANEMIA STUDIES  No results for input(s): LABIRON, TIBC, FERRITIN, [id number], FOLATE, OCCULTBLD in the last 72 hours. Cultures during this admission:     Blood cultures:                 [] None drawn      [x] Negative             []  Positive (Details:  )  Urine Culture:                   [] None drawn      [] Negative             []  Positive (Details:  )  Sputum Culture:               [] None drawn       [] Negative             []  Positive (Details:  )   Endotracheal aspirate:     [] None drawn       [] Negative             []  Positive (Details:  )     Chest Xray (12/5/2022):    ASSESSMENT:     1) Hypovolemic shock 2/2 acute blood loss from UGIB  2) CAD s/p CABG 1 month ago  3) Atrial fibrillation on coumadin  4) NSTEMI  5) ERICA 2/2 hypovolemia 2/2 shock and GI bleed  6) HTN   7) HLD  8) Depression   9) Combined acute gapped metabolic acidosis with acute respiratory alkalosis    PLAN:     Neuro  -Patient is intubated and sedated  -Plan to wean vent and sedation today   -Patient is on Propofol and Precedex for sedation  -Zyprexa as needed  -Cont neuro checks as per protocol    CV  -Patient intermittently goes into A.fib with RVR  -Resumed the amiodarone as per the cardio recommendations  -Continue Heparin gtt today   -Keep MAP > 65    Resp  -PRVC: 30%/16/650/8  -ABG: pH 7.37; pCO2 38.8; PO2 44.7; HCO3 22.6  -Continue to monitor    GI  - UGI bleed 2/2 duodenal ulcer  -S/p EGD 11/29 --> duodenal ulcer with visible vessel cauterized with bipolar probe, old blood in the stomach.  - GI recs post EGD --> continue with PPI drip for another 24 to 48 hours and then twice daily for 8 weeks.   -S/p 2u pRBC on 11/30/22  - Diet NPO  ADULT TUBE FEEDING; Orogastric; Peptide Based High Protein; Continuous; 40; Yes; 10; Q 4 hours; 60; 30; Q 4 hours  -Cont Protonix IV BID  -Ok to restart anticoagulation

## 2022-12-05 NOTE — PROGRESS NOTES
Order obtained for extubation. SpO2 of 98 on 30% FiO2. Patient extubated and placed on 2 liters/min via nasal cannula. Post extubation SpO2 is 100% with HR  58 bpm and RR 13 breaths/min. Patient had strong cough that was productive of yellow sputum. Extubation Well tolerated by patient. .   Breath Sounds: Coarse    Carlin Izaguirre RCP   12:01 PM

## 2022-12-05 NOTE — PLAN OF CARE
Problem: Discharge Planning  Goal: Discharge to home or other facility with appropriate resources  Outcome: Progressing     Problem: Confusion  Goal: Confusion, delirium, dementia, or psychosis is improved or at baseline  Description: INTERVENTIONS:  1. Assess for possible contributors to thought disturbance, including medications, impaired vision or hearing, underlying metabolic abnormalities, dehydration, psychiatric diagnoses, and notify attending LIP  2. [place] high risk fall precautions, as indicated  3. Provide frequent short contacts to provide reality reorientation, refocusing and direction  4. Decrease environmental stimuli, including noise as appropriate  5. Monitor and intervene to maintain adequate nutrition, hydration, elimination, sleep and activity  6. If unable to ensure safety without constant attention obtain sitter and review sitter guidelines with assigned personnel  7. Initiate Psychosocial CNS and Spiritual Care consult, as indicated  Outcome: Progressing     Problem: Pain  Goal: Verbalizes/displays adequate comfort level or baseline comfort level  Outcome: Progressing     Problem: Cardiovascular - Adult  Goal: Maintains optimal cardiac output and hemodynamic stability  Outcome: Progressing  Goal: Absence of cardiac dysrhythmias or at baseline  Outcome: Progressing     Problem: Gastrointestinal - Adult  Goal: Minimal or absence of nausea and vomiting  Outcome: Progressing  Goal: Maintains or returns to baseline bowel function  Outcome: Progressing  Goal: Maintains adequate nutritional intake  Outcome: Progressing  Goal: Establish and maintain optimal ostomy function  Outcome: Progressing     Problem: Respiratory - Adult  Goal: Achieves optimal ventilation and oxygenation  Outcome: Progressing     Problem: Nutrition Deficit:  Goal: Optimize nutritional status  Outcome: Progressing     Problem: Skin/Tissue Integrity  Goal: Absence of new skin breakdown  Description: 1.   Monitor for areas of redness and/or skin breakdown  2. Assess vascular access sites hourly  3. Every 4-6 hours minimum:  Change oxygen saturation probe site  4. Every 4-6 hours:  If on nasal continuous positive airway pressure, respiratory therapy assess nares and determine need for appliance change or resting period.   Outcome: Progressing     Problem: Safety - Adult  Goal: Free from fall injury  Outcome: Progressing     Problem: ABCDS Injury Assessment  Goal: Absence of physical injury  Outcome: Progressing     Problem: Neurosensory - Adult  Goal: Achieves stable or improved neurological status  Outcome: Progressing  Goal: Absence of seizures  Outcome: Progressing  Goal: Remains free of injury related to seizures activity  Outcome: Progressing  Goal: Achieves maximal functionality and self care  Outcome: Progressing     Problem: Skin/Tissue Integrity - Adult  Goal: Skin integrity remains intact  Outcome: Progressing  Goal: Incisions, wounds, or drain sites healing without S/S of infection  Outcome: Progressing  Goal: Oral mucous membranes remain intact  Outcome: Progressing

## 2022-12-05 NOTE — PROGRESS NOTES
[facility] [address] [address]  PROGRESS NOTE    Shift date: 12/5/2022  Shift day: Monday   Shift # 1    Room # 3021/3021-01   Name: Nathan Abrams                Mormon: Unknown   Place of Roman Catholic: Unknown    Referral: Routine Visit    Admit Date & Time: 11/29/2022 12:20 PM    Assessment:  Nathan Abrams is a 68 y.o. male in the hospital because upper GI bleed. Upon entering the room writer observes patient is sitting up in bed but struggling physically. Patient's significant other is by the bed side holding a suction tube. Intervention:  Writer introduced self and title as  Writer offered space for significant other and patient  to express feelings, needs, and concerns and provided a ministry presence. Patient is struggling to find voice. Significant other mentions that the patient has just been extubated. Patient is struggling because of the pain from the tube removal.  asks if he can pray. Patient nods head yes. Outcome:   listens to significant other.  tells patient to rest. Fara Koo prays for patient. Patient and significant other thank  for visit. Plan:  Chaplains will remain available to offer spiritual and emotional support as needed. Electronically signed by Lorinda Saint Resident, on 12/5/2022 at 2:54 PM.  [facility] [place]  [phone]       12/05/22 1452   Encounter Summary   Service Provided For: Patient;Patient and family together   Referral/Consult From: [address] [place] [address] Drive; Children   Last Encounter  12/05/22   Complexity of Encounter High   Begin Time 1140   End Time  1143   Total Time Calculated 3 min   Assessment/Intervention/Outcome   Assessment Coping;Calm; Hopeful   Intervention Active listening;Sustaining Presence/Ministry of presence;Prayer (assurance of)/[place]   Outcome Connection/Belonging;Coping;Engaged in conversation;Expressed Gratitude

## 2022-12-05 NOTE — PROGRESS NOTES
Occupational Therapy  Facility/Department: [facility] 3- MICU  Occupational Therapy Initial Assessment    Name: Jennifer Dennis  : 1945  MRN: [id number]  Date of Service: 2022  Obtained from medical chart:   \" Jennifer Dennis is a 68 y.o. presented from [facility] facilty with past medical history significant for CAD s/p CABG 10/25/2022, atrial fibrillation on Coumadin, diabetes, , HTN, HLD, depression. Presented for painless rectal bleeding multiple times with hg dropped from 8.6 to 6.8. received 2 units PRBC, 2 units FFP, and 1 unit cryoprecipitate as well as TXA, 10 mg of vitamin K.and 2000 units Kcentra for INR of 3.09. EGD 2022 and found to have a duodenal ulcer with duodenitis but no active bleeding. He had a colonoscopy with reports of no source of bleeding found. According to the chart of transfer, patient had multiple episodes of hematemesis and the repeat EGD showed old blood and no active duodenal ulcer treated with epinephrine. Patient was intebated and transfereed to [facility] for higher care. \"    Discharge Recommendations:  Patient would benefit from continued therapy after discharge. Further therapy recommended at discharge. The patient should be able to tolerate at least 3 hours of therapy per day over 5 days or 15 hours over 7 days. This patient may benefit from a Physical Medicine and Rehab consult. Patient Diagnosis(es): There were no encounter diagnoses. Past Medical History:  has no past medical history on file. Past Surgical History:  has a past surgical history that includes Upper gastrointestinal endoscopy (N/A, 2022). Assessment   Performance deficits / Impairments: Decreased functional mobility ; Decreased ADL status; Decreased endurance;Decreased high-level IADLs;Decreased safe awareness;Decreased balance;Decreased cognition;Decreased strength  Assessment: Patient supine upon arrival, required Mod A to sit EOB and engage in static sitting balance at [address] A- Mod A for ~8-10 minutes with posterior lean. Pt demonstrates decreased cognition and lethargy throughout evaluation. Pt required Min A to complete face washing this date d/t B UE weakness and decreased cognition. Pt motivated to participate in functional activity and progress with therapy. Pt would benefit from continued acute and post acute OT services to address functional deficits impacting performance and safety with ADLs and functional tasks. Prognosis: Good  Decision Making: Medium Complexity  REQUIRES OT FOLLOW-UP: Yes  Activity Tolerance  Activity Tolerance: Patient Tolerated treatment well;Patient limited by fatigue        Plan   Occupational Therapy Plan  Times Per Week: 4-5x/wk  Current Treatment Recommendations: Strengthening, Balance training, Functional mobility training, Endurance training, Equipment evaluation, education, & procurement, Patient/Caregiver education & training, Home management training, Positioning, Safety education & training     Restrictions  Restrictions/Precautions  Restrictions/Precautions: Up as Tolerated  Required Braces or Orthoses?: No    Subjective   General  Patient assessed for rehabilitation services?: Yes  Family / Caregiver Present: Yes (Wife and other family member)  General Comment  Comments: RN ok'd patient for OT evaluation. Pt pleasant, cooperative and agreeable. Pt emotional throughout evaluation, able to be re-directed. Pt declines any pain during evaluation.      Social/Functional History  Social/Functional History  Lives With: Spouse  Type of Home: House  Home Layout: One level  Home Access: Level entry  Bathroom Shower/Tub: Tub/Shower unit, Walk-in shower  Bathroom Toilet: Standard  Bathroom Equipment: Shower chair  ADL Assistance: Independent  Homemaking Assistance: Independent  Homemaking Responsibilities: Yes (shares with wife)  Meal Prep Responsibility: Secondary  Laundry Responsibility: Secondary  Cleaning Responsibility: Secondary  Shopping Responsibility: Secondary  Ambulation Assistance: Independent  Transfer Assistance: Independent  Active : Yes  Mode of Transportation: Truck  Occupation: Retired  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  Leisure & Hobbies: Fishing, hunting, spending time with family  Additional Comments: Has supportive family to provide assistance at discharge     Objective   Heart Rate: 76  Heart Rate Source: Monitor  BP: 126/69  BP Location: Right upper arm  BP Method: Automatic  Patient Position: Semi fowlers; Lying left side  MAP (Calculated): 88  Resp: 23  SpO2: 95 %  O2 Device: Nasal cannula             Safety Devices  Type of Devices: Gait belt;Call light within reach;Nurse notified; Left in bed;Patient at risk for falls  Restraints  Restraints Initially in Place: No  Balance  Sitting: With support (Min A grossly EOB for ~8-9 min with intermittent Mod A d/t fatigue)  Transfer Training  Transfer Training: No (d/t weakness and lethargy)     AROM: Generally decreased, functional (decreased B shoulder flexion ~90 degrees)  PROM: Within functional limits  Strength: Generally decreased, functional (B shoulders 3+/5; B elbow flex 4/5, B elbow ext 4-/5; B  4/5)  Coordination: Generally decreased, functional (decreased speed)  Tone: Normal  Sensation: Intact (declines any numbness or tingling)  ADL  Feeding: Minimal assistance;Setup;Verbal cueing; Increased time to complete  Grooming: Minimal assistance; Increased time to complete;Verbal cueing;Setup  UE Bathing: Moderate assistance;Setup; Increased time to complete;Verbal cueing  LE Bathing: Maximum assistance; Increased time to complete;Verbal cueing;Setup  UE Dressing: Moderate assistance;Verbal cueing;Setup; Increased time to complete  LE Dressing: Maximum assistance; Increased time to complete;Verbal cueing;Setup  Toileting: Maximum assistance; Increased time to complete;Setup  Additional Comments: Patient completed washing face at [address] [place] [address] with support at the elbow d/t shoulder weakness and coordination impacting ability to wash face. VCs to complete washing entirity of face required with good return; Increased time to complete all functional tasks. Bed mobility  Supine to Sit: Moderate assistance (assistance for trunk and LE progression)  Sit to Supine: Minimal assistance (assistance for LE progression)  Scooting: Moderate assistance  Bed Mobility Comments: Increased time to complete with VCs for sequencing     Vision  Vision: Impaired  Vision Exceptions: Wears glasses for reading  Hearing  Hearing: Within functional limits  Cognition  Overall Cognitive Status: Exceptions  Arousal/Alertness: Appropriate responses to stimuli  Following Commands: Follows multistep commands with repitition; Follows multistep commands with increased time  Attention Span: Attends with cues to redirect  Problem Solving: Decreased awareness of errors  Insights: Decreased awareness of deficits  Initiation: Requires cues for some  Sequencing: Requires cues for some  Orientation  Overall Orientation Status: Within Functional Limits     Education Given To: Patient; Family  Education Provided: Role of Therapy; ADL Adaptive Strategies; Fall Prevention Strategies; Family Education; Energy Conservation;Equipment;Plan of Care  Education Method: Verbal;Demonstration  Barriers to Learning: Cognition  Education Outcome: Continued education needed;Verbalized understanding  AM-PAC Score        AM-[facility] Inpatient Daily Activity Raw Score: 14 (12/05/22 1650)  -PAC Inpatient ADL T-Scale Score : 33.39 (12/05/22 1650)  ADL Inpatient CMS 0-100% Score: 59.67 (12/05/22 1650)  ADL Inpatient CMS G-Code Modifier : CK (12/05/22 1650)  Goals  Short Term Goals  Time Frame for Short Term Goals: Patient will, by discharge  Short Term Goal 1: demo UB ADLs at Mod I  Short Term Goal 2: demo LB ADLs at SBA using AE PRN  Short Term Goal 3: demo bed mobility at A to engage in functional tasks  Short Term Goal 4: demo grooming/self-feeding at Mod I using AE PRN  Short Term Goal 5: demo 15+ min of dynamic sitting balance at [facility] to engage in ADL tasks  Short Term Goal 6: demo functional transfers at [facility] using LRD to engage in ADLs     Therapy Time   Individual Concurrent Group Co-treatment   Time In 1421         Time Out 1501         Minutes 40         Timed Code Treatment Minutes: Lm 108, OTR/L

## 2022-12-05 NOTE — PROGRESS NOTES
[facility]   Progress Note                    Date:   12/5/2022  Patient name:  Jorge Smith  Date of admission:  11/29/2022 12:20 PM  MRN:   [id number]  YOB: 1945  PCP:    Stefany Sotelo    Reason for Admission:  Upper GI bleed [K92.2]    Subjective:        Clinical Changes / Abnormalities:    Patient seen and examined at bedside. No acute events overnight. Remains intubated and sedated  On heparin  gtt , amio oral .  Currently in Sinus bradycardia in upper 40s . likely due to precedix  mainly and propool . Medications:   Scheduled Meds:   insulin glargine  20 Units SubCUTAneous BID    insulin lispro  0-8 Units SubCUTAneous Q4H    amiodarone  200 mg Oral Daily    OLANZapine  5 mg Per NG tube Nightly    piperacillin-tazobactam  3,375 mg IntraVENous Q8H    vancomycin (VANCOCIN) intermittent dosing (placeholder)   Other RX Placeholder    vancomycin  1,500 mg IntraVENous Q24H    pantoprazole (PROTONIX) 40 mg injection  40 mg IntraVENous Q12H    sodium chloride flush  5-40 mL IntraVENous 2 times per day     Continuous Infusions:   heparin (PORCINE) Infusion 15 Units/kg/hr (12/05/22 6622)    propofol 10 mcg/kg/min (12/05/22 6679)    dexmedetomidine 1.2 mcg/kg/hr (12/05/22 9612)    sodium chloride      sodium chloride Stopped (12/05/22 0434)    fentaNYL 50 mcg/mL Stopped (12/01/22 1309)    norepinephrine Stopped (12/04/22 0422)    sodium chloride 50 mL/hr at 12/05/22 0652    dextrose       CBC:   Recent Labs     12/03/22  1025 12/03/22  1524 12/04/22  0542 12/04/22  0949 12/04/22  1217 12/04/22  1749 12/04/22  2333   WBC 11.4*  --  8.8  --  7.5  --   --    HGB 8.0*   < > 7.7*   < > 7.4* 7.9* 7.5*     --  169  --  172  --   --     < > = values in this interval not displayed.        BMP:    Recent Labs     12/03/22  1025 12/04/22  0542    137   K 3.8 3.7   * 107   CO2 17* 19*   BUN 20 19   CREATININE 1.33* 1.21*   GLUCOSE 271* 313*       Hepatic: No results for input(s): AST, ALT, ALB, BILITOT, ALKPHOS in the last 72 hours. Troponin: No results for input(s): TROPONINI in the last 72 hours. No results for input(s): TROPONINT in the last 72 hours. BNP: No results for input(s): PROBNP in the last 72 hours. No results for input(s): BNP in the last 72 hours. Lipids: No results for input(s): CHOL, HDL in the last 72 hours. Invalid input(s): LDLCALCU  INR: No results for input(s): INR in the last 72 hours. Objective:   Vitals: BP (!) 116/56   Pulse 57   Temp 97.7 °F (36.5 °C) (Bladder)   Resp 16   Ht 6' 2\" (1.88 m)   Wt 210 lb 5.1 oz (95.4 kg)   SpO2 97%   BMI 27.00 kg/m²    General appearance: Intubated and sedated. HEENT: Head: Normocephalic, no lesions, without obvious abnormality. Neck: no adenopathy, no carotid bruit, no JVD, supple, symmetrical, trachea midline and thyroid not enlarged, symmetric, no tenderness/mass/nodules  Lungs: clear to auscultation bilaterally  Heart: regular rate and rhythm, S1, S2 normal, no murmur, click, rub or gallop  Abdomen: soft, non-tender; bowel sounds normal; no masses,  no organomegaly  Extremities: extremities normal, atraumatic, no cyanosis or edema        DATA:    Diagnostics:       ECHO:   04/2018  The left ventricle is normal size. There is normal left ventricular wall thickness. Left ventricle systolic function is normal.      The Ejection Fraction is 55-60%. Grade I diastolic dysfunction. Cannot rule out anteroapical hypokinesis seen only in apical 4 chamber views although endocardium   was not sen well. Echo 04/2022    Left Ventricle: Left ventricle appears normal in size. Wall thickness   is normal. Systolic function is low normal to mildly decreased with an   ejection fraction of 50-55%. Left Atrium: Left atrium is normal in size. The left atrial volume   index is 27.1 mL/m2.      Right Ventricle: Right ventricular size appears normal. The right   ventricular basal diameter is 35.0 mm. Systolic function is normal.     Mitral Valve: The leaflets are mildly thickened and exhibit normal   excursion. Aortic Valve: The aortic valve is congenitally bicuspid. The leaflets   exhibit normal excursion. There is moderate sclerosis. There is no   regurgitation. There is mild stenosis. The calculated aortic valve area is   1.69 cm2. The calculated aortic valve peak gradient is 11.83 mmHg. The   calculated aortic valve mean gradient is 6.00 mmHg. Assessment / Acute Cardiac Problems:   Shock likely hypovolemic secondary to bleeding duodenal ulcer, currently on pressor support. Status post EGD 11/29/2022 with bipolar cautery of bleeding duodenal ulcer. CAD status post PCI X 3 in 2012 and CABG X2 with LIMA to distal LAD, SVG to OM1 with exploration of distal PDA 10/25/2022. Post op CABG- A. fib which was newly diagnosed in November 2022, on Coumadin and amiodarone. Preserved LVEF on echocardiogram as above. Elevated troponin 910 with mild uptrend. Likely secondary to bleed. Acute hypoxic respiratory failure secondary to above. Bicuspid Aortic valve. Hypertension. Hyperlipidemia. Chronic LBBB. Reintubated 12/2 /2022  Sinus bradycardia         Plan of Treatment:   Continue  heparin gtt . Continue PO amidarone  Currently intubated and sedated. Wean as tolerated  Currently in Sinus bradycardia in upper 40s . likely due to precedix  mainly and propofol . K>4, Mg>2  Rest per primary team    Caroline MD Johnny  Fellow, [address] [place] [address]      I performed a history and physical examination of the patient and discussed management with the resident. I reviewed the residents note and agree with the documented findings and plan of care. Any areas of disagreement are noted on the chart. I was personally present for the key portions of any procedures.  I have documented in the chart those procedures where I was not present during the martinez portions. I have personally evaluated this patient and have completed at least one if not all key elements of the E/M (history, physical exam, and MDM). Additional findings are as noted. DC IV heparin. Continue amiodarone. ASA 81mg po qday when ok with GI. Call cardiology with questions, will sign off.     Eri Ly MD

## 2022-12-05 NOTE — PLAN OF CARE
Problem: Respiratory - Adult  Goal: Achieves optimal ventilation and oxygenation  12/4/2022 2017 by Purvi Hernandez RCP  Outcome: Progressing  Flowsheets (Taken 12/4/2022 2017)  Achieves optimal ventilation and oxygenation:   Assess for changes in respiratory status   Assess the need for suctioning and aspirate as needed   Respiratory therapy support as indicated   Assess for changes in mentation and behavior   Oxygen supplementation based on oxygen saturation or arterial blood gases

## 2022-12-06 LAB
ANION GAP SERPL CALCULATED.3IONS-SCNC: 11 MMOL/L (ref 9–17)
BUN BLDV-MCNC: 16 MG/DL (ref 8–23)
CALCIUM SERPL-MCNC: 7.8 MG/DL (ref 8.6–10.4)
CHLORIDE BLD-SCNC: 111 MMOL/L (ref 98–107)
CO2: 24 MMOL/L (ref 20–31)
CREAT SERPL-MCNC: 0.94 MG/DL (ref 0.7–1.2)
GFR SERPL CREATININE-BSD FRML MDRD: >60 ML/MIN/1.73M2
GLUCOSE BLD-MCNC: 117 MG/DL (ref 75–110)
GLUCOSE BLD-MCNC: 148 MG/DL (ref 75–110)
GLUCOSE BLD-MCNC: 187 MG/DL (ref 75–110)
GLUCOSE BLD-MCNC: 69 MG/DL (ref 75–110)
GLUCOSE BLD-MCNC: 81 MG/DL (ref 70–99)
GLUCOSE BLD-MCNC: 81 MG/DL (ref 74–100)
GLUCOSE BLD-MCNC: 81 MG/DL (ref 75–110)
GLUCOSE BLD-MCNC: 82 MG/DL (ref 75–110)
HCT VFR BLD CALC: 24.3 % (ref 40.7–50.3)
HCT VFR BLD CALC: 24.9 % (ref 40.7–50.3)
HEMOGLOBIN: 7.5 G/DL (ref 13–17)
HEMOGLOBIN: 7.9 G/DL (ref 13–17)
MAGNESIUM: 1.8 MG/DL (ref 1.6–2.6)
MCH RBC QN AUTO: 28.5 PG (ref 25.2–33.5)
MCHC RBC AUTO-ENTMCNC: 30.9 G/DL (ref 28.4–34.8)
MCV RBC AUTO: 92.4 FL (ref 82.6–102.9)
NRBC AUTOMATED: 0 PER 100 WBC
PDW BLD-RTO: 16.6 % (ref 11.8–14.4)
PLATELET # BLD: 223 K/UL (ref 138–453)
PMV BLD AUTO: 9.7 FL (ref 8.1–13.5)
POC HCO3: 25.3 MMOL/L (ref 21–28)
POC O2 SATURATION: 97 % (ref 94–98)
POC PCO2: 34.8 MM HG (ref 35–48)
POC PH: 7.47 (ref 7.35–7.45)
POC PO2: 82.2 MM HG (ref 83–108)
POSITIVE BASE EXCESS, ART: 2 (ref 0–3)
POTASSIUM SERPL-SCNC: 3.1 MMOL/L (ref 3.7–5.3)
RBC # BLD: 2.63 M/UL (ref 4.21–5.77)
SAMPLE SITE: ABNORMAL
SODIUM BLD-SCNC: 146 MMOL/L (ref 135–144)
WBC # BLD: 5.2 K/UL (ref 3.5–11.3)

## 2022-12-06 PROCEDURE — 6360000002 HC RX W HCPCS

## 2022-12-06 PROCEDURE — 2580000003 HC RX 258

## 2022-12-06 PROCEDURE — 6360000002 HC RX W HCPCS: Performed by: HEALTH CARE PROVIDER

## 2022-12-06 PROCEDURE — 36600 WITHDRAWAL OF ARTERIAL BLOOD: CPT

## 2022-12-06 PROCEDURE — 82803 BLOOD GASES ANY COMBINATION: CPT

## 2022-12-06 PROCEDURE — 82947 ASSAY GLUCOSE BLOOD QUANT: CPT

## 2022-12-06 PROCEDURE — 85027 COMPLETE CBC AUTOMATED: CPT

## 2022-12-06 PROCEDURE — 85018 HEMOGLOBIN: CPT

## 2022-12-06 PROCEDURE — 2500000003 HC RX 250 WO HCPCS

## 2022-12-06 PROCEDURE — 94761 N-INVAS EAR/PLS OXIMETRY MLT: CPT

## 2022-12-06 PROCEDURE — 36415 COLL VENOUS BLD VENIPUNCTURE: CPT

## 2022-12-06 PROCEDURE — 2580000003 HC RX 258: Performed by: STUDENT IN AN ORGANIZED HEALTH CARE EDUCATION/TRAINING PROGRAM

## 2022-12-06 PROCEDURE — 6370000000 HC RX 637 (ALT 250 FOR IP): Performed by: STUDENT IN AN ORGANIZED HEALTH CARE EDUCATION/TRAINING PROGRAM

## 2022-12-06 PROCEDURE — 2060000000 HC ICU INTERMEDIATE R&B

## 2022-12-06 PROCEDURE — 2700000000 HC OXYGEN THERAPY PER DAY

## 2022-12-06 PROCEDURE — 6360000002 HC RX W HCPCS: Performed by: STUDENT IN AN ORGANIZED HEALTH CARE EDUCATION/TRAINING PROGRAM

## 2022-12-06 PROCEDURE — 94660 CPAP INITIATION&MGMT: CPT

## 2022-12-06 PROCEDURE — C9113 INJ PANTOPRAZOLE SODIUM, VIA: HCPCS | Performed by: STUDENT IN AN ORGANIZED HEALTH CARE EDUCATION/TRAINING PROGRAM

## 2022-12-06 PROCEDURE — 83735 ASSAY OF MAGNESIUM: CPT

## 2022-12-06 PROCEDURE — 6370000000 HC RX 637 (ALT 250 FOR IP): Performed by: INTERNAL MEDICINE

## 2022-12-06 PROCEDURE — 2500000003 HC RX 250 WO HCPCS: Performed by: HEALTH CARE PROVIDER

## 2022-12-06 PROCEDURE — 80048 BASIC METABOLIC PNL TOTAL CA: CPT

## 2022-12-06 PROCEDURE — 2580000003 HC RX 258: Performed by: HEALTH CARE PROVIDER

## 2022-12-06 PROCEDURE — 6370000000 HC RX 637 (ALT 250 FOR IP)

## 2022-12-06 PROCEDURE — 92610 EVALUATE SWALLOWING FUNCTION: CPT

## 2022-12-06 PROCEDURE — 85014 HEMATOCRIT: CPT

## 2022-12-06 PROCEDURE — 99291 CRITICAL CARE FIRST HOUR: CPT | Performed by: INTERNAL MEDICINE

## 2022-12-06 RX ORDER — POTASSIUM CHLORIDE 20 MEQ/1
40 TABLET, EXTENDED RELEASE ORAL ONCE
Status: COMPLETED | OUTPATIENT
Start: 2022-12-06 | End: 2022-12-06

## 2022-12-06 RX ORDER — DEXTROSE MONOHYDRATE 25 G/50ML
25 INJECTION, SOLUTION INTRAVENOUS ONCE
Status: COMPLETED | OUTPATIENT
Start: 2022-12-06 | End: 2022-12-06

## 2022-12-06 RX ORDER — POTASSIUM CHLORIDE 7.45 MG/ML
10 INJECTION INTRAVENOUS
Status: ACTIVE | OUTPATIENT
Start: 2022-12-06 | End: 2022-12-06

## 2022-12-06 RX ORDER — POTASSIUM CHLORIDE 20 MEQ/1
TABLET, EXTENDED RELEASE ORAL
Status: COMPLETED
Start: 2022-12-06 | End: 2022-12-06

## 2022-12-06 RX ORDER — DEXTROSE AND SODIUM CHLORIDE 5; .45 G/100ML; G/100ML
INJECTION, SOLUTION INTRAVENOUS CONTINUOUS
Status: DISCONTINUED | OUTPATIENT
Start: 2022-12-06 | End: 2022-12-07

## 2022-12-06 RX ORDER — INSULIN LISPRO 100 [IU]/ML
0-4 INJECTION, SOLUTION INTRAVENOUS; SUBCUTANEOUS
Status: DISCONTINUED | OUTPATIENT
Start: 2022-12-06 | End: 2022-12-10

## 2022-12-06 RX ORDER — FUROSEMIDE 10 MG/ML
20 INJECTION INTRAMUSCULAR; INTRAVENOUS ONCE
Status: COMPLETED | OUTPATIENT
Start: 2022-12-06 | End: 2022-12-06

## 2022-12-06 RX ORDER — OLANZAPINE 5 MG/1
5 TABLET ORAL NIGHTLY
Status: DISCONTINUED | OUTPATIENT
Start: 2022-12-06 | End: 2022-12-08

## 2022-12-06 RX ORDER — DEXTROSE MONOHYDRATE 25 G/50ML
25 INJECTION, SOLUTION INTRAVENOUS ONCE
Status: DISCONTINUED | OUTPATIENT
Start: 2022-12-06 | End: 2022-12-14 | Stop reason: HOSPADM

## 2022-12-06 RX ORDER — DEXTROSE MONOHYDRATE 100 MG/ML
INJECTION, SOLUTION INTRAVENOUS CONTINUOUS PRN
Status: DISCONTINUED | OUTPATIENT
Start: 2022-12-06 | End: 2022-12-14 | Stop reason: HOSPADM

## 2022-12-06 RX ORDER — HEPARIN SODIUM 5000 [USP'U]/ML
5000 INJECTION, SOLUTION INTRAVENOUS; SUBCUTANEOUS EVERY 8 HOURS SCHEDULED
Status: DISCONTINUED | OUTPATIENT
Start: 2022-12-06 | End: 2022-12-14 | Stop reason: HOSPADM

## 2022-12-06 RX ORDER — INSULIN LISPRO 100 [IU]/ML
0-4 INJECTION, SOLUTION INTRAVENOUS; SUBCUTANEOUS NIGHTLY
Status: DISCONTINUED | OUTPATIENT
Start: 2022-12-06 | End: 2022-12-10

## 2022-12-06 RX ADMIN — POTASSIUM CHLORIDE 40 MEQ: 20 TABLET, EXTENDED RELEASE ORAL at 18:26

## 2022-12-06 RX ADMIN — ASPIRIN 81 MG: 81 TABLET, CHEWABLE ORAL at 15:41

## 2022-12-06 RX ADMIN — DEXTROSE MONOHYDRATE 12.5 G: 25 INJECTION, SOLUTION INTRAVENOUS at 10:07

## 2022-12-06 RX ADMIN — DEXMEDETOMIDINE HYDROCHLORIDE 0.9 MCG/KG/HR: 4 INJECTION, SOLUTION INTRAVENOUS at 04:00

## 2022-12-06 RX ADMIN — SODIUM CHLORIDE, PRESERVATIVE FREE 40 MG: 5 INJECTION INTRAVENOUS at 15:56

## 2022-12-06 RX ADMIN — POTASSIUM CHLORIDE 40 MEQ: 1500 TABLET, EXTENDED RELEASE ORAL at 18:26

## 2022-12-06 RX ADMIN — AMIODARONE HYDROCHLORIDE 200 MG: 200 TABLET ORAL at 15:42

## 2022-12-06 RX ADMIN — SODIUM CHLORIDE, PRESERVATIVE FREE 40 MG: 5 INJECTION INTRAVENOUS at 03:53

## 2022-12-06 RX ADMIN — HEPARIN SODIUM 5000 UNITS: 5000 INJECTION INTRAVENOUS; SUBCUTANEOUS at 21:53

## 2022-12-06 RX ADMIN — SODIUM CHLORIDE, PRESERVATIVE FREE 10 ML: 5 INJECTION INTRAVENOUS at 09:00

## 2022-12-06 RX ADMIN — FUROSEMIDE 20 MG: 10 INJECTION, SOLUTION INTRAMUSCULAR; INTRAVENOUS at 10:27

## 2022-12-06 RX ADMIN — POLYMYXIN B SULFATE, BACITRACIN ZINC, NEOMYCIN SULFATE: 5000; 3.5; 4 OINTMENT TOPICAL at 20:53

## 2022-12-06 RX ADMIN — PIPERACILLIN AND TAZOBACTAM 3375 MG: 3; .375 INJECTION, POWDER, FOR SOLUTION INTRAVENOUS at 05:12

## 2022-12-06 RX ADMIN — DEXTROSE AND SODIUM CHLORIDE: 5; 450 INJECTION, SOLUTION INTRAVENOUS at 10:17

## 2022-12-06 RX ADMIN — HEPARIN SODIUM 5000 UNITS: 5000 INJECTION INTRAVENOUS; SUBCUTANEOUS at 10:33

## 2022-12-06 RX ADMIN — LORAZEPAM 1 MG: 2 INJECTION INTRAMUSCULAR; INTRAVENOUS at 00:11

## 2022-12-06 RX ADMIN — POLYMYXIN B SULFATE, BACITRACIN ZINC, NEOMYCIN SULFATE: 5000; 3.5; 4 OINTMENT TOPICAL at 13:11

## 2022-12-06 RX ADMIN — POTASSIUM CHLORIDE 10 MEQ: 7.46 INJECTION, SOLUTION INTRAVENOUS at 18:25

## 2022-12-06 RX ADMIN — POTASSIUM CHLORIDE 10 MEQ: 7.46 INJECTION, SOLUTION INTRAVENOUS at 20:28

## 2022-12-06 RX ADMIN — OLANZAPINE 5 MG: 5 TABLET, FILM COATED ORAL at 20:50

## 2022-12-06 RX ADMIN — SODIUM CHLORIDE, PRESERVATIVE FREE 10 ML: 5 INJECTION INTRAVENOUS at 20:52

## 2022-12-06 RX ADMIN — DEXMEDETOMIDINE HYDROCHLORIDE 0.7 MCG/KG/HR: 4 INJECTION, SOLUTION INTRAVENOUS at 09:07

## 2022-12-06 ASSESSMENT — PULMONARY FUNCTION TESTS
PIF_VALUE: 10

## 2022-12-06 ASSESSMENT — PAIN SCALES - GENERAL: PAINLEVEL_OUTOF10: 0

## 2022-12-06 NOTE — PROGRESS NOTES
[facility]  Speech Language Pathology    Date: 12/6/2022  Patient Name: David Solorzano  YOB: 1945   AGE: 68 y.o. MRN: [id number]        Patient Not Available for Speech Therapy     Due to:  [] Testing  [] Hemodialysis  [] Cancelled by RN  [] Surgery   [] Intubation/Sedation/Pain Medication  [] Medical instability  [x] Other: ST entered pt's room for swallow evaluation, unable to rouse pt with max verbal, tactile cues and stimulation. RN and physician notified. Pt not appropriate for swallow assessment at this time.     Next scheduled treatment: 12/7/22  Completed by: Daniel Dueñas, RICHARD, M.S. [address] [facility]

## 2022-12-06 NOTE — PROGRESS NOTES
Comprehensive Nutrition Assessment    Type and Reason for Visit:  Reassess    Nutrition Recommendations/Plan:   Continue NPO. Monitor for diet or nutrition support  If TF, recommend Glucerna 1.5 (diabetic formula) with goal rate of 55 ml/hr providing 1980 kcal and 109 gm protein     Malnutrition Assessment:  Malnutrition Status:  Insufficient data (11/30/22 3763)    Context:  Acute Illness     Findings of the 6 clinical characteristics of malnutrition:  Energy Intake:  Unable to assess  Weight Loss:  Unable to assess     Body Fat Loss:  Unable to assess     Muscle Mass Loss:  Unable to assess    Fluid Accumulation:  Unable to assess     Strength:  Not Performed    Nutrition Assessment:    Patient extubated yesterday. TF stopped. Patient not appropriate for speech therapy eval at this time, confused, unable to wake up. Will monitor for speech therapy recommendations or nutrition support    Nutrition Related Findings:    meds/labs reviewed Wound Type: None       Current Nutrition Intake & Therapies:    Average Meal Intake: NPO  Average Supplements Intake: NPO  Diet NPO    Anthropometric Measures:  Height: 6' 2\" (188 cm)  Ideal Body Weight (IBW): 190 lbs (86 kg)    Admission Body Weight: 207 lb 0.2 oz (93.9 kg)  Current Body Weight: 200 lb (90.7 kg), 105.3 % IBW. Weight Source: Bed Scale  Current BMI (kg/m2): 25.7                          BMI Categories: Overweight (BMI 25.0-29. 9)    Estimated Daily Nutrient Needs:  Energy Requirements Based On: Formula  Weight Used for Energy Requirements: Current  Energy (kcal/day): 0960-8158 kcal/day  Weight Used for Protein Requirements: Current  Protein (G/day): 110-115 gm protein/day  Method Used for Fluid Requirements: Other (Comment)  Fluid (ml/day): per MD    Nutrition Diagnosis:   Inadequate oral intake related to cognitive or neurological impairment as evidenced by NPO or clear liquid status due to medical condition    Nutrition Interventions:   Food and/or Nutrient Delivery: Continue NPO  Nutrition Education/Counseling: No recommendation at this time  Coordination of Nutrition Care: Continue to monitor while inpatient  Plan of Care discussed with: RN    Goals:  Previous Goal Met: No Progress toward Goal(s)  Goals: Meet at least 75% of estimated needs, within 7 days       Nutrition Monitoring and Evaluation:   Behavioral-Environmental Outcomes: None Identified  Food/Nutrient Intake Outcomes: Diet Advancement/Tolerance  Physical Signs/Symptoms Outcomes: Weight, Biochemical Data, Nutrition Focused Physical Findings    Discharge Planning:     Too soon to determine     Akua Acosta, [address] [facility] [address]  Contact: [phone]

## 2022-12-06 NOTE — PLAN OF CARE
Problem: Confusion  Goal: Confusion, delirium, dementia, or psychosis is improved or at baseline  Outcome: Progressing     Problem: Pain  Goal: Verbalizes/displays adequate comfort level or baseline comfort level  Outcome: Progressing     Problem: Cardiovascular - Adult  Goal: Maintains optimal cardiac output and hemodynamic stability  Outcome: Progressing     Problem: Cardiovascular - Adult  Goal: Absence of cardiac dysrhythmias or at baseline  Outcome: Progressing     Problem: Gastrointestinal - Adult  Goal: Minimal or absence of nausea and vomiting  Outcome: Progressing     Problem: Gastrointestinal - Adult  Goal: Maintains or returns to baseline bowel function  Outcome: Progressing     Problem: Gastrointestinal - Adult  Goal: Maintains adequate nutritional intake  Outcome: Progressing     Problem: Gastrointestinal - Adult  Goal: Establish and maintain optimal ostomy function  Outcome: Progressing     Problem: Respiratory - Adult  Goal: Achieves optimal ventilation and oxygenation  Outcome: Progressing  Flowsheets (Taken 12/6/2022 0800)  Achieves optimal ventilation and oxygenation: Assess for changes in respiratory status     Problem: Neurosensory - Adult  Goal: Achieves stable or improved neurological status  Outcome: Progressing     Problem: Neurosensory - Adult  Goal: Absence of seizures  Outcome: Progressing     Problem: Neurosensory - Adult  Goal: Remains free of injury related to seizures activity  Outcome: Progressing     Problem: Neurosensory - Adult  Goal: Achieves maximal functionality and self care  Outcome: Progressing     Problem: Skin/Tissue Integrity - Adult  Goal: Skin integrity remains intact  Outcome: Progressing  Flowsheets (Taken 12/6/2022 0800)  Skin Integrity Remains Intact: Monitor for areas of redness and/or skin breakdown     Problem: Skin/Tissue Integrity - Adult  Goal: Incisions, wounds, or drain sites healing without S/S of infection  Outcome: Progressing     Problem: Skin/Tissue Integrity - Adult  Goal: Oral mucous membranes remain intact  Outcome: Progressing     Problem: Nutrition Deficit:  Goal: Optimize nutritional status  Outcome: Progressing     Problem: Skin/Tissue Integrity  Goal: Absence of new skin breakdown  Outcome: Progressing     Problem: Safety - Adult  Goal: Free from fall injury  Outcome: Progressing     Problem: ABCDS Injury Assessment  Goal: Absence of physical injury  Outcome: Progressing

## 2022-12-06 NOTE — PROGRESS NOTES
Speech Language Pathology  Facility/Department: [facility]   CLINICAL BEDSIDE SWALLOW EVALUATION    NAME: Ronel Ferrera  : 1945  MRN: [id number]    ADMISSION DATE: 2022  ADMITTING DIAGNOSIS: has Upper GI bleed on their problem list.      Date of Eval: 2022  Evaluating Therapist: RICHARD Mccray    Current Diet level:  Current Diet : NPO      Primary Complaint History was obtained from chart review. Ronel Ferrera is a 68 y.o. presented from [facility] with past medical history significant for CAD s/p CABG 10/25/2022, atrial fibrillation on Coumadin, diabetes, , HTN, HLD, depression. Presented for painless rectal bleeding multiple times with hg dropped from 8.6 to 6.8. received 2 units PRBC, 2 units FFP, and 1 unit cryoprecipitate as well as TXA, 10 mg of vitamin K.and 2000 units Kcentra for INR of 3.09. EGD 2022 and found to have a duodenal ulcer with duodenitis but no active bleeding. He had a colonoscopy with reports of no source of bleeding found. According to the chart of transfer, patient had multiple episodes of hematemesis and the repeat EGD showed old blood and no active duodenal ulcer treated with epinephrine. Patient was intebated and transfereed to [facility] for higher care.           Pain:  Pain Assessment  Pain Assessment: Critical Care Pain Observation Tool (CPOT)  Pain Level: 3  Non-Pharmaceutical Pain Intervention(s): Family support  Response to Pain Intervention: Unable to communicate  Pain Assessment in Advanced Dementia (PAINAD)  Breathing Independent of Vocalization: normal  Negative Vocalization: none  Facial Expression: smiling or inexpressive  Body Language: relaxed  Consolability: no need to console  PAINAD Score: 0    Reason for Referral  Ronel Ferrera was referred for a bedside swallow evaluation to assess the efficiency of his swallow function, identify signs and symptoms of aspiration and make recommendations regarding safe dietary consistencies, effective compensatory strategies, and safe eating environment. Impression  Patient presents with probable safe swallow for Dysphagia soft and bite/sized (Dysphagia III) diet with Mildly Thick (Nectar) liquids as evidenced by no overt s/s of aspiration noted with these consistencies when tested. +immediate cough with thin liquid by straw, +delayed cough following regular solid. Recommend small sips and bites, only feed when alert and awake and upright at 90 degrees for all PO intake. Recommend close monitoring for overt/clinical s/s of aspiration and D/C PO intake and complete Modified Barium Swallow Study should they occur. Results and recommendations reported to RN. Dysphagia Diagnosis: Mild oral stage dysphagia;Mild to moderate pharyngeal stage dysphagia  Dysphagia Outcome Severity Scale: Level 4: Mild moderate dysphagia- Intermittent supervision/cueing. One - two diet consistencies restricted     Treatment Plan  Requires SLP Intervention: Yes   Frequency: 3-5x/week  D/C Recommendations: Ongoing speech therapy is recommended during this hospitalization; Ongoing speech therapy is recommended at next level of care       Recommended Diet and Intervention   Solids: Dysphagia soft and bite/sized (Dysphagia III)  Liquids: Mildly Thick (Nectar)  Assist feed     Recommended Form of Meds: Meds in puree     Therapeutic Interventions: Diet tolerance monitoring; Therapeutic PO trials with SLP;Oral care; Patient/Family education    Compensatory Swallowing Strategies  Compensatory Swallowing Strategies : Alternate solids and liquids;Eat/Feed slowly;Upright as possible for all oral intake;Small bites/sips; Remain upright for 30-45 minutes after meals    Treatment/Goals  Short-term Goals  Goal 1: Pt will tolerate recommended diet without overt s/s of aspiration. Goal 2: Pt will repeat bedside swallow evaluation as appropriate for potential upgrade.     General  Chart Reviewed: Yes  Behavior/Cognition: Alert;Confused; Cooperative  O2 Device: Nasal cannula  Follows Directions: Simple  Dentition: Adequate  Patient Positioning: Upright in bed  Baseline Vocal Quality: Weak  Consistencies Administered: All         Oral Motor Deficits   WFL    Oral Phase Dysfunction  Oral Phase  Oral Phase: Exceptions  Oral Phase  Oral Phase - Comment: Prolonged mastication with soft solid, perseverative mastication. Prolonged mastication of regular solid     Indicators of Pharyngeal Phase Dysfunction   Pharyngeal Phase   Pharyngeal Phase: No overt s/s of aspiration with puree, soft solid, nectar thick liquid by straw. +immediate coughing following thin liquid by straw. +delayed coughing following regular dry solid.     Prognosis  Individuals consulted  Consulted and agree with results and recommendations: Patient;RN;Family member    Education  Patient Education: yes  Patient Education Response: Verbalizes understanding             Therapy Time   [id number]            LUCIE Manjarrez  12/6/2022 3:25 PM

## 2022-12-06 NOTE — PROGRESS NOTES
Critical Care Team - Daily Progress Note      Date and time: 12/6/2022 7:19 AM  Patient's name:  Kaley Sullivan  Medical Record Number: [id number]  Patient's account/billing number: [de-identified]  Patient's YOB: 1945  Age: 68 y.o. Date of Admission: 11/29/2022 12:20 PM  Length of stay during current admission: 7      Primary Care Physician: [facility]  ICU Attending Physician: Dr. Patel Call Status: Full Code    Reason for ICU admission: No chief complaint on file. Kaley Sullivan is a 68 y.o. presented from [facility] with past medical history significant for CAD s/p CABG 10/25/2022, atrial fibrillation on Coumadin, diabetes, , HTN, HLD, depression. Presented for painless rectal bleeding multiple times with hg dropped from 8.6 to 6.8. received 2 units PRBC, 2 units FFP, and 1 unit cryoprecipitate as well as TXA, 10 mg of vitamin K.and 2000 units Kcentra for INR of 3.09. EGD 11/28/2022 and found to have a duodenal ulcer with duodenitis but no active bleeding. He had a colonoscopy with reports of no source of bleeding found. According to the chart of transfer, patient had multiple episodes of hematemesis and the repeat EGD showed old blood and no active duodenal ulcer treated with epinephrine. Patient was intebated and transfereed to [facility] for higher care.     SUBJECTIVE:     OVERNIGHT EVENTS:         Extubated 12/5/22  No acute events   Afebrile   MAP 70s, not requiring pressure support    Sat 90s on NC, one desaturation this AM to 80s and placed on mask     Pt remains confused     GI   No BM overnight,last on 12/3 and was black/tarry, unchanged from previous BM     HgB 7.5 (7.7, 7.5, 7.9, 7.4, 7.6)   Continue Protonix 40mg BID     Continue Amiodarone for Afib w/ RVR   D/c Heparin gtt yesterday per cardiology, Start ASA     On Zosyn and Vancomycin for hospital acquired pneumonia  WBC 5.2  Sputum gram stain 12/4/22: mixed bacterial morphotypes    BG 81  Lantus 20 BID (home dose) - HELD   MDSS     Today:  Start subq heparin for DVT prophylaxis   Swallow study    Monitor BG and adjust insulin as needed    AWAKE & FOLLOWING COMMANDS:  [] No   [x] Yes    CURRENT VENTILATION STATUS:     [] Ventilator  [] BIPAP  [x] Nasal Cannula [] Room Air      IF INTUBATED, ET TUBE MARKING AT LOWER LIP:       cms    SECRETIONS Amount:  [x] Small [] Moderate  [] Large  [] None  Color:     [] White [] Colored  [] Bloody    SEDATION:  RAAS Score:  [] Propofol gtt  [] Versed gtt  [] Ativan gtt   [x] No Sedation    PARALYZED:  [x] No    [] Yes    DIARRHEA:                [x] No                [] Yes  (C. Difficile status: [] positive                                                                                                                       [] negative                                                                                                                     [] pending)    VASOPRESSORS:  [x] No    [] Yes    If yes -   [] Levophed       [] Dopamine     [] Vasopressin       [] Dobutamine  [] Phenylephrine         [] Epinephrine    CENTRAL LINES:     [] No   [x] Yes   (Date of Insertion:   2022 )           If yes -     [] Right IJ     [] Left IJ [] Right Femoral [] Left Femoral                   [] Right Subclavian [] Left Subclavian       BENNETT'S CATHETER:   [] No   [x] Yes  (Date of Insertion:   )     URINE OUTPUT:            [x] Good   [] Low              [] Anuric      OBJECTIVE:     VITAL SIGNS:  BP (!) 131/57   Pulse 60   Temp 98.2 °F (36.8 °C) (Bladder)   Resp 16   Ht 6' 2\" (1.88 m)   Wt 200 lb 6.4 oz (90.9 kg)   SpO2 98%   BMI 25.73 kg/m²   Tmax over 24 hours:  Temp (24hrs), Av.5 °F (36.9 °C), Min:97.7 °F (36.5 °C), Max:99 °F (37.2 °C)      Patient Vitals for the past 8 hrs:   BP Temp Temp src Pulse Resp SpO2 Weight   22 0556 -- -- -- 60 16 98 % --   22 0533 -- -- -- -- -- -- 200 lb 6.4 oz (90.9 kg)   22 0300 (!) 131/57 -- -- 56 18 97 % --   22 0200 (!) 122/52 98.2 °F (36.8 °C) Bladder 53 19 96 % --   12/06/22 0100 133/60 -- -- 60 23 (!) 89 % --   12/06/22 0000 (!) 135/59 98.6 °F (37 °C) Bladder 60 19 96 % --           Intake/Output Summary (Last 24 hours) at 12/6/2022 0719  Last data filed at 12/6/2022 1182  Gross per 24 hour   Intake 1554.12 ml   Output 3695 ml   Net -2140.88 ml       Date 12/06/22 0000 - 12/06/22 2359   Shift 9146-3044 [id number] 4417-6454 24 Hour Total   INTAKE   I.V.(mL/kg) 278.6(3.1)   278.6(3.1)   IV Piggyback(mL/kg) 167(1.8)   167(1.8)   Shift Total(mL/kg) 445.6(4.9)   445.6(4.9)   OUTPUT   Urine(mL/kg/hr) 850   850   Shift Total(mL/kg) 850(9.4)   850(9.4)   Weight (kg) 90.9 90.9 90.9 90.9       Wt Readings from Last 3 Encounters:   12/06/22 200 lb 6.4 oz (90.9 kg)     Body mass index is 25.73 kg/m². PHYSICAL EXAM:  Constitutional: A&O x3, following commands, confused overnight, in no acute distress, hemodynamically stable   EENT: PERRLA  Neck: Supple, symmetrical, trachea midline,   Respiratory: Bilateral normal vesicular breathing  Cardiovascular: regular rate and rhythm, normal S1, S2, no murmur noted and 2+ pulses throughout  Abdomen: soft, nontender, nondistended, no masses or organomegaly  NEUROLOGIC: Intubated and sedated.   Extremities:  peripheral pulses normal, no pedal edema, no clubbing or cyanosis  SKIN: normal coloration and turgor    Any additional physical findings:      MEDICATIONS:  Scheduled Meds:   neomycin-bacitracin-polymyxin   Topical BID    aspirin  81 mg Oral Daily    [Held by provider] insulin glargine  20 Units SubCUTAneous BID    insulin lispro  0-8 Units SubCUTAneous Q4H    amiodarone  200 mg Oral Daily    OLANZapine  5 mg Per NG tube Nightly    piperacillin-tazobactam  3,375 mg IntraVENous Q8H    vancomycin (VANCOCIN) intermittent dosing (placeholder)   Other RX Placeholder    vancomycin  1,500 mg IntraVENous Q24H    pantoprazole (PROTONIX) 40 mg injection  40 mg IntraVENous Q12H    sodium chloride flush 5-40 mL IntraVENous 2 times per day     Continuous Infusions:   dexmedetomidine 0.9 mcg/kg/hr (12/06/22 0602)    sodium chloride      sodium chloride Stopped (12/06/22 0512)    norepinephrine Stopped (12/04/22 0422)    dextrose       PRN Meds:   fentanNYL, 50 mcg, Q1H PRN  heparin (porcine), 4,000 Units, PRN  heparin (porcine), 2,000 Units, PRN  LORazepam, 1 mg, Q6H PRN  sodium chloride, , PRN  sodium chloride flush, 5-40 mL, PRN  sodium chloride, , PRN  ondansetron, 4 mg, Q8H PRN   Or  ondansetron, 4 mg, Q6H PRN  polyethylene glycol, 17 g, Daily PRN  acetaminophen, 650 mg, Q6H PRN   Or  acetaminophen, 650 mg, Q6H PRN  sodium phosphate IVPB, 10 mmol, PRN   Or  sodium phosphate IVPB, 15 mmol, PRN   Or  sodium phosphate IVPB, 20 mmol, PRN  magnesium sulfate, 2,000 mg, PRN  potassium chloride, 20 mEq, PRN   Or  potassium chloride, 10 mEq, PRN  glucose, 4 tablet, PRN  dextrose bolus, 125 mL, PRN   Or  dextrose bolus, 250 mL, PRN  glucagon (rDNA), 1 mg, PRN  dextrose, , Continuous PRN      SUPPORT DEVICES: [] Ventilator [] BIPAP  [x] Nasal Cannula [] Room Air    VENT SETTINGS (Comprehensive) (if applicable):  Vent Information  Equipment Changed: Expiratory Filter, Suction catheter, HME  Ventilator Initiate: Yes  Ventilator Discontinue: Yes  Vent Mode: AC/PRVC  Additional Respiratory Assessments  Heart Rate: 60  Resp: 16  SpO2: 98 %  End Tidal CO2: 30 (%)  Position: Semi-Hardy's  Humidification Source: HME  Cuff Pressure (cm H2O):  (mov)    ABGs:   Lab Results   Component Value Date/Time    FIO2 30.0 12/05/2022 04:31 AM     Lactic Acid: No results found for: LACTA      DATA:  Complete Blood Count:   Recent Labs     12/04/22  0542 12/04/22  0949 12/04/22  1217 12/04/22  1749 12/05/22  1627 12/05/22  2005 12/06/22  0229   WBC 8.8  --  7.5  --   --   --  5.2   HGB 7.7*   < > 7.4*   < > 8.4* 8.1* 7.5*   MCV 92.5  --  97.2  --   --   --  92.4     --  172  --   --   --  223   RBC 2.67*  --  2.52*  --   --   --  2.63* HCT 24.7*   < > 24.5*   < > 27.0* 25.4* 24.3*   MCH 28.8  --  29.4  --   --   --  28.5   MCHC 31.2  --  30.2  --   --   --  30.9   RDW 17.2*  --  17.2*  --   --   --  16.6*   MPV 9.8  --  9.7  --   --   --  9.7    < > = values in this interval not displayed. PT/INR:    Lab Results   Component Value Date/Time    PROTIME 13.4 11/30/2022 04:31 AM    INR 1.3 11/30/2022 04:31 AM     PTT:    Lab Results   Component Value Date/Time    APTT 44.1 12/05/2022 04:27 PM       Basal Metabolic Profile:   Recent Labs     12/03/22  1025 12/04/22  0542 12/05/22  0734    137 142   K 3.8 3.7 3.7   BUN 20 19 22   CREATININE 1.33* 1.21* 0.95   * 107 111*   CO2 17* 19* 23        Magnesium:   Lab Results   Component Value Date/Time    MG 2.0 11/29/2022 10:52 PM     Phosphorus: No results found for: PHOS  S. Calcium:  Recent Labs     12/05/22  0734   CALCIUM 7.7*       S. Ionized Calcium:No results for input(s): IONCA in the last 72 hours. Urinalysis:   Lab Results   Component Value Date/Time    WBCUA 20 TO 50 11/29/2022 01:45 PM    RBCUA 2 TO 5 11/29/2022 01:45 PM       CARDIAC ENZYMES: No results for input(s): CKMB, CKMBINDEX, TROPONINI in the last 72 hours. Invalid input(s): CKTOTAL;3  BNP: No results for input(s): BNP in the last 72 hours. LFTS  No results for input(s): ALKPHOS, ALT, AST, BILITOT, BILIDIR, LABALBU in the last 72 hours. AMYLASE/LIPASE/AMMONIA  No results for input(s): AMYLASE, LIPASE, AMMONIA in the last 72 hours. Last 3 Blood Glucose:   Recent Labs     12/03/22  1025 12/04/22  0542 12/05/22  0734   GLUCOSE 271* 313* 277*        HgBA1c:    Lab Results   Component Value Date/Time    LABA1C 6.1 11/29/2022 02:06 PM         TSH:  No results found for: TSH  ANEMIA STUDIES  No results for input(s): LABIRON, TIBC, FERRITIN, [id number], FOLATE, OCCULTBLD in the last 72 hours.     Cultures during this admission:     Blood cultures:                 [] None drawn      [x] Negative []  Positive (Details:  )  Urine Culture:                   [] None drawn      [] Negative             []  Positive (Details:  )  Sputum Culture:               [] None drawn       [] Negative             []  Positive (Details:  )   Endotracheal aspirate:     [] None drawn       [] Negative             []  Positive (Details:  )     Chest Xray (12/6/2022):    ASSESSMENT:     1) Hypovolemic shock 2/2 acute blood loss from UGIB  2) CAD s/p CABG 1 month ago  3) Atrial fibrillation on coumadin  4) NSTEMI  5) ERICA 2/2 hypovolemia 2/2 shock and GI bleed  6) HTN   7) HLD  8) Depression   9) Combined acute gapped metabolic acidosis with acute respiratory alkalosis    PLAN:     Neuro  -Patient is awake and following commands   -Extubated 12/5/22  -Zyprexa as needed  -Cont neuro checks as per protocol    CV  -Patient intermittently goes into A.fib with RVR  -Resumed the amiodarone as per the cardio recommendations  -D/c Heparin gtt per Cardiology, start ASA   -Keep MAP > 72    Resp  -Satting well on NC   -Continue to monitor    GI  - UGI bleed 2/2 duodenal ulcer  -S/p EGD 11/29 --> duodenal ulcer with visible vessel cauterized with bipolar probe, old blood in the stomach.  - GI recs post EGD --> continue with PPI drip for another 24 to 48 hours and then twice daily for 8 weeks.   -S/p 2u pRBC on 11/30/22  - Diet NPO  ADULT TUBE FEEDING; Orogastric; Peptide Based High Protein; Continuous; 40; Yes; 10; Q 4 hours; 60; 30; Q 4 hours  -Cont Protonix IV BID  -Ok to restart anticoagulation for Afib, hemoglobin stable, VSS     /Renal  -Electrolytes every other day   -UOP: appropriate  - Replace electrolytes as needed     Heme  -HgB 7.5 (7.7, 7.5, 7.9, 7.4, 7.6)   -Monitor H & H  -Transfuse if HB< 8.0 if clinically necessary per GI     ID  - Afebrile overnight   - On Zosyn and Vancomycin for  hospital acquired pneumonia  - Treat fever with Tylenol PRN     Endo  - Lantus to 20 units BID HELD as patient is NPO   - Continue MDSS   - Continue to monitor closely     DVT Ppx: SCDs, heparin subq   GI Ppx: Protonix BID   Diet: Tube feeds    Perla Eastman MD           Department of Internal Medicine/ Critical care  [address] [facility] ([place])             12/6/2022, 7:19 AM  Attending Physician Statement  I have discussed the care of Brit Grills, including pertinent history and exam findings,  with the resident. I have seen and examined the patient and the key elements of all parts of the encounter have been performed by me. I agree with the assessment, plan and orders as documented by the resident with additions . During course of day pt became somnolent   Precedex weaned off   Start on bipap due to apneas ,   Bg corrected - start d5 1/2 ns at 50 ml/hr   Pt responded well  Passed swallow study   Transfer to floor   Use bipap at night and prn   Out pt pft   Daily lasix   Total critical care time caring for this patient with life threatening, unstable organ failure, including direct patient contact, management of life support systems, review of data including imaging and labs, discussions with other team members and physicians at least 28   Min so far today, excluding procedures. Electronically signed by Tamera Roche MD on   12/6/22 at 5:59 PM EST    Please note that this chart was generated using voice recognition Dragon dictation software. Although every effort was made to ensure the accuracy of this automated transcription, some errors in transcription may have occurred.

## 2022-12-06 NOTE — PLAN OF CARE
Problem: Discharge Planning  Goal: Discharge to home or other facility with appropriate resources  12/6/2022 0345 by Delmis Painting RN  Outcome: Progressing  12/5/2022 1740 by Martine Huertas RN  Outcome: Progressing     Problem: Confusion  Goal: Confusion, delirium, dementia, or psychosis is improved or at baseline  Description: INTERVENTIONS:  1. Assess for possible contributors to thought disturbance, including medications, impaired vision or hearing, underlying metabolic abnormalities, dehydration, psychiatric diagnoses, and notify attending LIP  2. [place] high risk fall precautions, as indicated  3. Provide frequent short contacts to provide reality reorientation, refocusing and direction  4. Decrease environmental stimuli, including noise as appropriate  5. Monitor and intervene to maintain adequate nutrition, hydration, elimination, sleep and activity  6. If unable to ensure safety without constant attention obtain sitter and review sitter guidelines with assigned personnel  7.  Initiate Psychosocial CNS and Spiritual Care consult, as indicated  12/6/2022 0345 by Delmis Painting RN  Outcome: Progressing  12/5/2022 1740 by Martine Huertas RN  Outcome: Progressing     Problem: Pain  Goal: Verbalizes/displays adequate comfort level or baseline comfort level  12/6/2022 0345 by Delmis Painting RN  Outcome: Progressing  12/5/2022 1740 by Martine Huertas RN  Outcome: Progressing     Problem: Cardiovascular - Adult  Goal: Maintains optimal cardiac output and hemodynamic stability  12/6/2022 0345 by Delmis Painting RN  Outcome: Progressing  12/5/2022 1740 by Martine Huertas RN  Outcome: Progressing  Goal: Absence of cardiac dysrhythmias or at baseline  12/6/2022 0345 by Delmis Painting RN  Outcome: Progressing  12/5/2022 1740 by Martine Huertas RN  Outcome: Progressing     Problem: Gastrointestinal - Adult  Goal: Minimal or absence of nausea and vomiting  12/6/2022 0345 by Delmis Painting RN  Outcome: Progressing  12/5/2022 1740 by Carlos Saenz RN  Outcome: Progressing  Goal: Maintains or returns to baseline bowel function  12/6/2022 0345 by Shahrzad Ferguson RN  Outcome: Progressing  12/5/2022 1740 by Carlos Saenz RN  Outcome: Progressing  Goal: Maintains adequate nutritional intake  12/6/2022 0345 by Shahrzad Ferguson RN  Outcome: Progressing  12/5/2022 1740 by Carlos Saenz RN  Outcome: Progressing  Goal: Establish and maintain optimal ostomy function  12/6/2022 0345 by Shahrzad Ferguson RN  Outcome: Progressing  12/5/2022 1740 by Carlos Saenz RN  Outcome: Progressing     Problem: Respiratory - Adult  Goal: Achieves optimal ventilation and oxygenation  12/6/2022 0345 by Shahrzad Ferguson RN  Outcome: Progressing  12/5/2022 1740 by Carlos Saenz RN  Outcome: Progressing     Problem: Nutrition Deficit:  Goal: Optimize nutritional status  12/6/2022 0345 by Shahrzad Ferguson RN  Outcome: Progressing  12/5/2022 1740 by Carlos Saenz RN  Outcome: Progressing     Problem: Skin/Tissue Integrity  Goal: Absence of new skin breakdown  Description: 1. Monitor for areas of redness and/or skin breakdown  2. Assess vascular access sites hourly  3. Every 4-6 hours minimum:  Change oxygen saturation probe site  4. Every 4-6 hours:  If on nasal continuous positive airway pressure, respiratory therapy assess nares and determine need for appliance change or resting period.   12/6/2022 0345 by Shahrzad Ferguson RN  Outcome: Progressing  12/5/2022 1740 by Carlos Saenz RN  Outcome: Progressing     Problem: Safety - Adult  Goal: Free from fall injury  12/6/2022 0345 by Shahrzad Ferguson RN  Outcome: Progressing  12/5/2022 1740 by Carlos Saenz RN  Outcome: Progressing     Problem: ABCDS Injury Assessment  Goal: Absence of physical injury  12/6/2022 0345 by Shahrzad Ferguson RN  Outcome: Progressing  12/5/2022 1740 by Carlos Saenz RN  Outcome: Progressing     Problem: Neurosensory - Adult  Goal: Achieves stable or improved neurological status  12/6/2022 0345 by Sonny Reyes RN  Outcome: Progressing  12/5/2022 1740 by Sharmaine Guevara RN  Outcome: Progressing  Goal: Absence of seizures  12/6/2022 0345 by Sonny Reyes RN  Outcome: Progressing  12/5/2022 1740 by Sharmaine Guevara RN  Outcome: Progressing  Goal: Remains free of injury related to seizures activity  12/6/2022 0345 by Sonny Reyes RN  Outcome: Progressing  12/5/2022 1740 by Sharmaine Guevara RN  Outcome: Progressing  Goal: Achieves maximal functionality and self care  12/6/2022 0345 by Sonny Reyes RN  Outcome: Progressing  12/5/2022 1740 by Sharmaine Guevara RN  Outcome: Progressing     Problem: Skin/Tissue Integrity - Adult  Goal: Skin integrity remains intact  12/6/2022 0345 by Sonny Reyes RN  Outcome: Progressing  12/5/2022 1740 by Sharmaine Guevara RN  Outcome: Progressing  Goal: Incisions, wounds, or drain sites healing without S/S of infection  12/6/2022 0345 by Sonny Reyes RN  Outcome: Progressing  12/5/2022 1740 by Sharmaine Guevara RN  Outcome: Progressing  Goal: Oral mucous membranes remain intact  12/6/2022 0345 by Sonny Reyes RN  Outcome: Progressing  12/5/2022 1740 by Sharmaine Guevara RN  Outcome: Progressing

## 2022-12-07 PROBLEM — Z95.1 S/P CABG (CORONARY ARTERY BYPASS GRAFT): Status: ACTIVE | Noted: 2022-11-11

## 2022-12-07 PROBLEM — E11.9 DIABETES MELLITUS TYPE 2, INSULIN DEPENDENT (HCC): Status: ACTIVE | Noted: 2022-12-07

## 2022-12-07 PROBLEM — I48.91 ATRIAL FIBRILLATION (HCC): Status: ACTIVE | Noted: 2022-11-02

## 2022-12-07 PROBLEM — D62 ANEMIA DUE TO BLOOD LOSS, ACUTE: Status: ACTIVE | Noted: 2022-12-07

## 2022-12-07 PROBLEM — Z79.4 DIABETES MELLITUS TYPE 2, INSULIN DEPENDENT (HCC): Status: ACTIVE | Noted: 2022-12-07

## 2022-12-07 LAB
ANION GAP SERPL CALCULATED.3IONS-SCNC: 12 MMOL/L (ref 9–17)
BUN BLDV-MCNC: 15 MG/DL (ref 8–23)
CALCIUM SERPL-MCNC: 7.9 MG/DL (ref 8.6–10.4)
CHLORIDE BLD-SCNC: 102 MMOL/L (ref 98–107)
CO2: 23 MMOL/L (ref 20–31)
CREAT SERPL-MCNC: 0.97 MG/DL (ref 0.7–1.2)
GFR SERPL CREATININE-BSD FRML MDRD: >60 ML/MIN/1.73M2
GLUCOSE BLD-MCNC: 218 MG/DL (ref 70–99)
GLUCOSE BLD-MCNC: 221 MG/DL (ref 75–110)
GLUCOSE BLD-MCNC: 239 MG/DL (ref 75–110)
GLUCOSE BLD-MCNC: 281 MG/DL (ref 75–110)
GLUCOSE BLD-MCNC: 308 MG/DL (ref 75–110)
HCT VFR BLD CALC: 29.3 % (ref 40.7–50.3)
HEMOGLOBIN: 8.9 G/DL (ref 13–17)
POTASSIUM SERPL-SCNC: 4.3 MMOL/L (ref 3.7–5.3)
SODIUM BLD-SCNC: 137 MMOL/L (ref 135–144)

## 2022-12-07 PROCEDURE — 99221 1ST HOSP IP/OBS SF/LOW 40: CPT | Performed by: INTERNAL MEDICINE

## 2022-12-07 PROCEDURE — 82947 ASSAY GLUCOSE BLOOD QUANT: CPT

## 2022-12-07 PROCEDURE — 6370000000 HC RX 637 (ALT 250 FOR IP): Performed by: INTERNAL MEDICINE

## 2022-12-07 PROCEDURE — 85014 HEMATOCRIT: CPT

## 2022-12-07 PROCEDURE — 6360000002 HC RX W HCPCS

## 2022-12-07 PROCEDURE — 6360000002 HC RX W HCPCS: Performed by: STUDENT IN AN ORGANIZED HEALTH CARE EDUCATION/TRAINING PROGRAM

## 2022-12-07 PROCEDURE — 2580000003 HC RX 258: Performed by: INTERNAL MEDICINE

## 2022-12-07 PROCEDURE — 85018 HEMOGLOBIN: CPT

## 2022-12-07 PROCEDURE — 94660 CPAP INITIATION&MGMT: CPT

## 2022-12-07 PROCEDURE — 80048 BASIC METABOLIC PNL TOTAL CA: CPT

## 2022-12-07 PROCEDURE — 2580000003 HC RX 258: Performed by: STUDENT IN AN ORGANIZED HEALTH CARE EDUCATION/TRAINING PROGRAM

## 2022-12-07 PROCEDURE — 51798 US URINE CAPACITY MEASURE: CPT

## 2022-12-07 PROCEDURE — 2580000003 HC RX 258

## 2022-12-07 PROCEDURE — C9113 INJ PANTOPRAZOLE SODIUM, VIA: HCPCS | Performed by: STUDENT IN AN ORGANIZED HEALTH CARE EDUCATION/TRAINING PROGRAM

## 2022-12-07 PROCEDURE — 36415 COLL VENOUS BLD VENIPUNCTURE: CPT

## 2022-12-07 PROCEDURE — 97530 THERAPEUTIC ACTIVITIES: CPT

## 2022-12-07 PROCEDURE — 2060000000 HC ICU INTERMEDIATE R&B

## 2022-12-07 PROCEDURE — 97162 PT EVAL MOD COMPLEX 30 MIN: CPT

## 2022-12-07 PROCEDURE — 6370000000 HC RX 637 (ALT 250 FOR IP): Performed by: STUDENT IN AN ORGANIZED HEALTH CARE EDUCATION/TRAINING PROGRAM

## 2022-12-07 RX ORDER — SODIUM CHLORIDE 450 MG/100ML
INJECTION, SOLUTION INTRAVENOUS CONTINUOUS
Status: DISCONTINUED | OUTPATIENT
Start: 2022-12-07 | End: 2022-12-08

## 2022-12-07 RX ORDER — TAMSULOSIN HYDROCHLORIDE 0.4 MG/1
0.4 CAPSULE ORAL DAILY
Status: DISCONTINUED | OUTPATIENT
Start: 2022-12-07 | End: 2022-12-14 | Stop reason: HOSPADM

## 2022-12-07 RX ORDER — INSULIN GLARGINE 100 [IU]/ML
15 INJECTION, SOLUTION SUBCUTANEOUS 2 TIMES DAILY
Status: DISCONTINUED | OUTPATIENT
Start: 2022-12-08 | End: 2022-12-08

## 2022-12-07 RX ADMIN — OLANZAPINE 5 MG: 5 TABLET, FILM COATED ORAL at 20:31

## 2022-12-07 RX ADMIN — SODIUM CHLORIDE, PRESERVATIVE FREE 40 MG: 5 INJECTION INTRAVENOUS at 04:08

## 2022-12-07 RX ADMIN — SODIUM CHLORIDE, PRESERVATIVE FREE 10 ML: 5 INJECTION INTRAVENOUS at 09:25

## 2022-12-07 RX ADMIN — HEPARIN SODIUM 5000 UNITS: 5000 INJECTION INTRAVENOUS; SUBCUTANEOUS at 15:12

## 2022-12-07 RX ADMIN — SODIUM CHLORIDE, PRESERVATIVE FREE 10 ML: 5 INJECTION INTRAVENOUS at 20:31

## 2022-12-07 RX ADMIN — POLYMYXIN B SULFATE, BACITRACIN ZINC, NEOMYCIN SULFATE: 5000; 3.5; 4 OINTMENT TOPICAL at 09:12

## 2022-12-07 RX ADMIN — DEXTROSE AND SODIUM CHLORIDE: 5; 450 INJECTION, SOLUTION INTRAVENOUS at 06:30

## 2022-12-07 RX ADMIN — LORAZEPAM 1 MG: 2 INJECTION INTRAMUSCULAR; INTRAVENOUS at 20:42

## 2022-12-07 RX ADMIN — SODIUM CHLORIDE, PRESERVATIVE FREE 40 MG: 5 INJECTION INTRAVENOUS at 15:13

## 2022-12-07 RX ADMIN — SODIUM CHLORIDE: 4.5 INJECTION, SOLUTION INTRAVENOUS at 22:12

## 2022-12-07 RX ADMIN — INSULIN LISPRO 1 UNITS: 100 INJECTION, SOLUTION INTRAVENOUS; SUBCUTANEOUS at 12:30

## 2022-12-07 RX ADMIN — POLYMYXIN B SULFATE, BACITRACIN ZINC, NEOMYCIN SULFATE: 5000; 3.5; 4 OINTMENT TOPICAL at 20:31

## 2022-12-07 RX ADMIN — INSULIN LISPRO 4 UNITS: 100 INJECTION, SOLUTION INTRAVENOUS; SUBCUTANEOUS at 20:31

## 2022-12-07 RX ADMIN — TAMSULOSIN HYDROCHLORIDE 0.4 MG: 0.4 CAPSULE ORAL at 15:13

## 2022-12-07 RX ADMIN — INSULIN LISPRO 1 UNITS: 100 INJECTION, SOLUTION INTRAVENOUS; SUBCUTANEOUS at 09:33

## 2022-12-07 RX ADMIN — INSULIN LISPRO 2 UNITS: 100 INJECTION, SOLUTION INTRAVENOUS; SUBCUTANEOUS at 16:58

## 2022-12-07 RX ADMIN — HEPARIN SODIUM 5000 UNITS: 5000 INJECTION INTRAVENOUS; SUBCUTANEOUS at 20:30

## 2022-12-07 RX ADMIN — AMIODARONE HYDROCHLORIDE 200 MG: 200 TABLET ORAL at 09:12

## 2022-12-07 RX ADMIN — ASPIRIN 81 MG: 81 TABLET, CHEWABLE ORAL at 09:12

## 2022-12-07 RX ADMIN — HEPARIN SODIUM 5000 UNITS: 5000 INJECTION INTRAVENOUS; SUBCUTANEOUS at 06:33

## 2022-12-07 NOTE — PROGRESS NOTES
Critical care team - Resident sign-out to medicine service      Date and time: 12/6/2022 7:52 PM  Patient's name:  Shree Baum Record Number: [id number]  Patient's account/billing number: [de-identified]  Patient's YOB: 1945  Age: 68 y.o. Date of Admission: 11/29/2022 12:20 PM  Length of stay during current admission: 7    Primary Care Physician: Grisel Casey    Code Status: Full Code    Mode of physician to physician communication:        [] Via telephone   [] In person     Date and time of sign-out: 12/6/2022 7:52 PM    Accepting Internal Medicine physician     Accepting Medicine team: IM Team intermed    Accepting team's attending: Nilesh Estes    Patient's current ICU Bed:  5613    Patient's assigned bed on floor: 108        [] Med-Surg Monitored [] Step-down       [] Psychiatry ICU       [] Psych floor     Reason for ICU admission:     GI bleed with melena    ICU course summary:     Greg An is a 68 [facility] medical history significant for CAD s/p CABG 10/25/2022, atrial fibrillation on Coumadin, diabetes, , HTN, HLD, depression. Presented for painless rectal bleeding multiple times with hg dropped from 8.6 to 6.8. received 2 units PRBC, 2 units FFP, and 1 unit cryoprecipitate as well as TXA, 10 mg of vitamin K.and 2000 units Kcentra for INR of 3.09. EGD 11/28/2022 and found to have a duodenal ulcer with duodenitis but no active bleeding. He had a colonoscopy with reports of no source of bleeding found. According to the chart of transfer, patient had multiple episodes of hematemesis and the repeat EGD showed old blood and no active duodenal ulcer treated with epinephrine. Patient was intubated and transfered to [place] [address] for hypovolemic shock  for higher care.   Extubated yesterday successfully, currently on BiPAP nightly and during daytime naps currently requiring no pressor support, still having black/tarry bowel movement but unchanged from before, hemoglobin stable, continues to be on Protonix 40 twice daily. Started on amiodarone and ASA for A. fib with RVR, cardiology on board. Been treated for pneumonia while in ICU      Procedures during patient's ICU stay:   Intubation/extubation    Current Vitals:     /77   Pulse 97   Temp 97.6 °F (36.4 °C) (Axillary)   Resp 18   Ht 6' 2\" (1.88 m)   Wt 200 lb 6.4 oz (90.9 kg)   SpO2 97%   BMI 25.73 kg/m²       Consults:     Cardiology,  GI    Assessment:     Patient Active Problem List    Diagnosis Date Noted    Upper GI bleed 11/29/2022     1) Hypovolemic shock 2/2 acute blood loss from UGIB  2) CAD s/p CABG 1 month ago  3) Atrial fibrillation on coumadin  4) NSTEMI  5) ERICA 2/2 hypovolemia 2/2 shock and GI bleed  6) HTN   7) HLD  8) Depression   9) Combined acute gapped metabolic acidosis with acute respiratory alkalosis    Recommended Follow-up:     -Follow cardiology recommendations, continue amiodarone  -Monitor respiratory status  -Monitor hemoglobin, continue Protonix  -Monitor blood sugars        Above mentioned assessment and plan was discussed by me with the admitting medicine resident. The medicine team assigned to the patient by medicine admitting resident will be following up the patient from now onwards on the floor.      Alcides Etienne MD,   PGY-3 IM Resident   12/6/2022, 7:52 PM

## 2022-12-07 NOTE — CONSULTS
[facility]  Office: [address], DO, Isaac Patella, DO, Tabby Beal, DO, Doretha Betancourt Blood, DO, Fabiola Valadez MD, Pushpa Burton MD, Antoinette Montoya MD, Samantha Velez MD,  Maya Singh MD, Sandy Wright MD, Elham Squires DO, Maggie Herndon MD,  Mulu Blake MD, Kelsey Lyon MD, Cory Coy DO, Hodan Poon MD, Vinnie Parra MD, Iesha Ventura DO, Kristina Handy MD, Nancy Recinos MD, Cristino Delong MD, Shanti Borrero MD, Marjorie Short DO, Raghu Snyder MD, Anila Allison MD, Shelia Perera, CNP,  Matthew Luu, CNP, Jeanie Lopez, CNP, Merwyn Cooks, CNP,  Kalani Green, [facility], Jennifer Basurto, CNP, Silvino Gallo, CNP, Yan Monsalve, CNP, Memo Hdez, CNP, Dm Sparks, CNP, Valeriy Farmer PA-C, Bethany Jorge, CNS, Antonietta Carlin, CNP, Josue Santana, CNP         [address] / HISTORY AND PHYSICAL EXAMINATION            Date:   12/7/2022  Patient name:  Araseli Baer  Date of admission:  11/29/2022 12:20 PM  MRN:   [id number]  Account:  [de-identified]  YOB: 1945  PCP:    Yahir Frost  Room:   [address] [place] [address]  Code Status:    Full Code    Physician Requesting Consult: Polo Tobias MD    Reason for Consult:  [place] of care, medical management    Chief Complaint:     Painless rectal bleeding    History Obtained From:     patient, electronic medical record    History of Present Illness:     Pt is a 68 yr old  male with a PMH CAD s/p CABG 10/25/22, A.fib on Coumadin, DM2, HTN [facility]. Hb 6.8 dropped, and he required transfusions. His INR was reversed with FFP, cryo, Kcentra and Vit K. Patient had an EGD on 11/28/22 that showed duodenitis and a duodenal ulcer, non bleeding.   Patient had hematemesis several times, and a repeat EGD showed old blood but no active bleeding, the ulcer had Epinephrine injected. He was subsequently intubated for resp failure and transferred to [facility]. [facility]. He did require pressors due to hypotension and was hypovolemic. He has since been extubated in the ICU and only wears Bipap at night. He still has dark, tarry stools, but unchanged from before. He is on Protonix 40mg BID. Cardiology was consulted and restarted his ASA. He was also treated for pneumonia in the ICU. He was up with PT/OT today became lightheaded. He denies SOB. His family is present and encouraging him to eat dinner. He is on thickened liquids and doesn't like them. He has no appetite and doesn't want to eat. + weakness. Past Medical History:     Past Medical History:   Diagnosis Date    A-fib ([facility] [address]     CAD (coronary artery disease)     Depression     DM2 (diabetes mellitus, type 2) (HCC)     HTN (hypertension)         Past Surgical History:     Past Surgical History:   Procedure Laterality Date    CARDIAC CATHETERIZATION      CAROTID ENDARTERECTOMY Right     CARPAL TUNNEL RELEASE      CHOLECYSTECTOMY      COLONOSCOPY      CORONARY ARTERY BYPASS GRAFT      UPPER GASTROINTESTINAL ENDOSCOPY N/A 11/29/2022    EGD CONTROL HEMORRHAGE performed by Nancy Vail MD at [facility]        Medications Prior to Admission:     Prior to Admission medications    Medication Sig Start Date End Date Taking?  Authorizing Provider   escitalopram (LEXAPRO) 10 MG tablet Take 10 mg by mouth daily   Yes Historical Provider, MD   metFORMIN (GLUCOPHAGE-XR) 500 MG extended release tablet Take 500 mg by mouth in the morning and at bedtime   Yes Historical Provider, MD   aspirin 81 MG EC tablet Take 81 mg by mouth daily   Yes Historical Provider, MD   glipiZIDE (GLUCOTROL XL) 10 MG extended release tablet Take 10 mg by mouth 2 times daily   Yes Historical Provider, MD   rosuvastatin (CRESTOR) 5 MG tablet Take 5 mg by mouth daily   Yes Historical Provider, MD   carvedilol (COREG) 6.25 MG tablet Take 6.25 mg by mouth 2 times daily (with meals)   Yes Historical Provider, MD   nitroGLYCERIN (NITROSTAT) 0.4 MG SL tablet Place 0.4 mg under the tongue every 5 minutes as needed for Chest pain up to max of 3 total doses. If no relief after 1 dose, call 911. Yes Historical Provider, MD   omeprazole (PRILOSEC) 20 MG delayed release capsule Take 20 mg by mouth 2 times daily   Yes Historical Provider, MD   isosorbide mononitrate (IMDUR) 30 MG extended release tablet Take 30 mg by mouth daily   Yes Historical Provider, MD   insulin glargine (BASAGLAR KWIKPEN) 100 UNIT/ML injection pen Inject 20 Units into the skin 2 times daily   Yes Historical Provider, MD        Allergies:     Atorvastatin    Social History:     Tobacco:    reports that he has quit smoking. His smoking use included cigarettes. He has never used smokeless tobacco.  Alcohol:      reports that he does not currently use alcohol. Drug Use:  reports no history of drug use. Family History:     Family History   Problem Relation Age of Onset    Cancer Mother     Cancer Father     Heart Disease Brother        Review of Systems:     Positive and Negative as described in HPI. CONSTITUTIONAL:  negative for fevers, chills, sweats, + fatigue  HEENT:  negative for vision, hearing changes, runny nose, throat pain  RESPIRATORY:  negative for shortness of breath, cough, congestion, wheezing. CARDIOVASCULAR:  negative for chest pain, palpitations.   GASTROINTESTINAL:  negative for nausea, vomiting, diarrhea, constipation, change in bowel habits, abdominal pain   GENITOURINARY:  negative for difficulty of urination, burning with urination, frequency   INTEGUMENT:  negative for rash, skin lesions, easy bruising   HEMATOLOGIC/LYMPHATIC:  negative for swelling/edema   ALLERGIC/IMMUNOLOGIC:  negative for urticaria , itching  ENDOCRINE:  negative increase in drinking, increase in urination, hot or cold intolerance  MUSCULOSKELETAL:  negative joint pains, muscle aches, swelling of joints  NEUROLOGICAL:  negative for headaches, + dizziness, lightheadedness  BEHAVIOR/PSYCH:  Positive for depression, anxiety    Physical Exam:     BP (!) 115/50   Pulse 100   Temp 98 °F (36.7 °C) (Temporal)   Resp 18   Ht 6' 2\" (1.88 m)   Wt 200 lb 6.4 oz (90.9 kg)   SpO2 98%   BMI 25.73 kg/m²   Temp (24hrs), Av.7 °F (37.1 °C), Min:98 °F (36.7 °C), Max:99.3 °F (37.4 °C)    Recent Labs     22  0803 22  1114 22  1531 22   POCGLU 221* 239* 281* 308*       Intake/Output Summary (Last 24 hours) at 2022 2233  Last data filed at 2022 2200  Gross per 24 hour   Intake 1983 ml   Output 1725 ml   Net 258 ml       General Appearance:  alert, ill appearing, and in no acute distress  Mental status: oriented to person, place, and time with flat affect  Head:  normocephalic, atraumatic. Eye: no icterus, redness, pupils equal and reactive, extraocular eye movements intact, conjunctiva clear  Ear: normal external ear, no discharge, hearing intact  Nose:  no drainage noted  Mouth: mucous membranes moist  Neck: supple, no carotid bruits, thyroid not palpable  Lungs: Bilateral equal air entry, clear to ausculation, no wheezing, rales or rhonchi, normal effort  Cardiovascular: normal rate, regular rhythm, no murmur, gallop, rub.   Abdomen: Soft, nontender, nondistended, normal bowel sounds, no hepatomegaly or splenomegaly  Neurologic: There are no new focal motor or sensory deficits, normal muscle tone and bulk, no abnormal sensation, normal speech, cranial nerves II through XII grossly intact  Skin: No gross lesions, rashes, bruising or bleeding on exposed skin area  Extremities:  peripheral pulses palpable, no pedal edema or calf pain with palpation  Psych: flat affect    Investigations:      Laboratory Testing:  Recent Results (from the past 24 hour(s))   Basic Metabolic Panel w/ Reflex to MG    Collection Time: 22  4:11 AM   Result Value Ref Range    Glucose 218 (H) 70 - 99 mg/dL    BUN 15 8 - 23 mg/dL    Creatinine 0.97 0.70 - 1.20 mg/dL    Est, Glom Filt Rate >60 >60 mL/min/1.73m2    Calcium 7.9 (L) 8.6 - 10.4 mg/dL    Sodium 137 135 - 144 mmol/L    Potassium 4.3 3.7 - 5.3 mmol/L    Chloride 102 98 - 107 mmol/L    CO2 23 20 - 31 mmol/L    Anion Gap 12 9 - 17 mmol/L   POC Glucose Fingerstick    Collection Time: 12/07/22  8:03 AM   Result Value Ref Range    POC Glucose 221 (H) 75 - 110 mg/dL   POC Glucose Fingerstick    Collection Time: 12/07/22 11:14 AM   Result Value Ref Range    POC Glucose 239 (H) 75 - 110 mg/dL   POC Glucose Fingerstick    Collection Time: 12/07/22  3:31 PM   Result Value Ref Range    POC Glucose 281 (H) 75 - 110 mg/dL   Hemoglobin and Hematocrit    Collection Time: 12/07/22  8:00 PM   Result Value Ref Range    Hemoglobin 8.9 (L) 13.0 - 17.0 g/dL    Hematocrit 29.3 (L) 40.7 - 50.3 %   POC Glucose Fingerstick    Collection Time: 12/07/22  8:28 PM   Result Value Ref Range    POC Glucose 308 (H) 75 - 110 mg/dL       Imaging/Diagonstics:  CT HEAD WO CONTRAST    Result Date: 12/2/2022  No acute intracranial abnormality. XR CHEST PORTABLE    Result Date: 12/4/2022  Increased left basilar atelectasis or pneumonia. Mild central vascular congestion. XR CHEST PORTABLE    Result Date: 12/2/2022  1. Endotracheal tube in expected position. 2. Bibasilar pulmonary opacities, atelectasis versus pneumonia. XR ABDOMEN FOR NG/OG/NE TUBE PLACEMENT    Result Date: 12/2/2022  Enteric tube in expected position.        Assessment :      Hospital Problems             Last Modified POA    * (Principal) Upper GI bleed 11/29/2022 Yes    Atrial fibrillation ([address] 12/7/2022 Yes    Essential hypertension 12/7/2022 Yes    S/P CABG (coronary artery bypass graft) 12/7/2022 Yes    Anemia due to blood loss, acute 12/7/2022 Yes    Diabetes mellitus type 2, insulin dependent ([address] 12/7/2022 Yes       Plan:     Upper GI bleed 2/2 duodenal ulcer and duodenitis- on PPI BID, s/p EGD and Epi  Anemia from acute GI loss- monitor H/H  CAD- ASA resumed  A.fib- Coumadin still - held, HR controlled on Amiodarone  HTN - may need to restart BB  DM2 - restart Lantus 15 units BID, SSI, patient not eating much, stop D 5 in 1/2NS  Urinary retention - start Flomax, straight cath prn if bladder scan > 350 ml urine  Depression  DVT proph- SQ Heparin TID  PT/OT  Start Boost supplements    DC planning once more stable, will  need to consider SNF or acute rehab. Dw pt and daughter?     Consultations:   IP CONSULT TO GI  IP CONSULT TO CARDIOLOGY      Shaheed Garcia MD  12/7/2022  10:33 PM    Copy sent to Dr. Tessa Glass

## 2022-12-07 NOTE — PROGRESS NOTES
Physical Therapy  Facility/Department: [address] NEURO  Physical Therapy Initial Assessment    Name: Kerrie Lake  : 1945  MRN: [id number]  Date of Service: 2022    Discharge Recommendations:    Further therapy recommended at discharge. The patient should be able to tolerate at least 3 hours of therapy per day over 5 days or 15 hours over 7 days. This patient may benefit from a Physical Medicine and Rehab consult. PT Equipment Recommendations  Equipment Needed: No      Patient Diagnosis(es): There were no encounter diagnoses. Past Medical History:  has no past medical history on file. Past Surgical History:  has a past surgical history that includes Upper gastrointestinal endoscopy (N/A, 2022). Assessment   Body Structures, Functions, Activity Limitations Requiring Skilled Therapeutic Intervention: Decreased functional mobility ; Decreased endurance;Decreased posture;Decreased balance;Decreased ROM; Decreased safe awareness;Decreased strength;Decreased coordination  Assessment: Pt ambulated to bedside chair 5ft with RW modA, pt with significant loss of balance requiring maxA to correct. Pt is a high fall risk and will require aggressive continued skilled physical therapy to address functional mobility deficits to return pt to prior level of independence.   Therapy Prognosis: Good  Decision Making: Medium Complexity  Requires PT Follow-Up: Yes  Activity Tolerance  Activity Tolerance: Patient limited by endurance  Activity Tolerance Comments: Limited due to nausea and vomiting     Plan   Physcial Therapy Plan  General Plan: 6-7 times per week  Current Treatment Recommendations: Strengthening, ROM, Balance training, Gait training, Functional mobility training, Transfer training, Stair training, Home exercise program, Safety education & training, Patient/Caregiver education & training, Therapeutic activities, Equipment evaluation, education, & procurement  Safety Devices  Type of Devices: Gait belt, Call light within reach, Nurse notified, Patient at risk for falls, Left in chair, Sitter present  Restraints  Restraints Initially in Place: No     Restrictions  Restrictions/Precautions  Restrictions/Precautions: Up as Tolerated  Required Braces or Orthoses?: No     Subjective   General  Patient assessed for rehabilitation services?: Yes  Response To Previous Treatment: Not applicable  Family / Caregiver Present: Yes (pt's wife at bedside)  Follows Commands: Within Functional Limits  General Comment  Comments: pt denying pain throughout evaluation  Subjective  Subjective: RN and pt in agreement for PT eval. Pt supine in bed upon PT arrival, pt on 3L NC, pt intermittently confused however cooperative throughout session         Social/Functional History  Social/Functional History  Lives With: Spouse  Type of Home: [facility]: One level  Home Access: Level entry  Bathroom Shower/Tub: Tub/Shower unit, Walk-in shower  Bathroom Toilet: Standard  Bathroom Equipment: Shower chair  ADL Assistance: Independent  Homemaking Assistance: Independent  Homemaking Responsibilities: Yes (shares with wife)  Meal Prep Responsibility: Secondary  Laundry Responsibility: Secondary  Cleaning Responsibility: Secondary  Shopping Responsibility: Secondary  Ambulation Assistance: Independent  Transfer Assistance: Independent  Active : Yes  Mode of Transportation: Truck  Occupation: Retired  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  Leisure & Hobbies: [address], hunting, spending time with family  Additional Comments: Has supportive family to provide assistance at discharge  [address]: Impaired  Vision Exceptions: Wears glasses for reading  Hearing  Hearing: Within functional limits    Cognition   Orientation  Overall Orientation Status: Impaired  Orientation Level: Oriented to person;Disoriented to situation;Oriented to time;Oriented to place  Cognition  Overall Cognitive Status: Exceptions  Arousal/Alertness: Appropriate responses to stimuli  Following Commands: Follows multistep commands with repitition; Follows multistep commands with increased time  Attention Span: Attends with cues to redirect  Problem Solving: Decreased awareness of errors  Insights: Decreased awareness of deficits  Initiation: Requires cues for some  Sequencing: Requires cues for some     Objective   Gross Assessment  Sensation: Intact     Joint Mobility  ROM RLE: WFL  ROM LLE: WFL  ROM RUE: Shoulder flexion to 90 degrees; Elbow, wrist, hand WFL  ROM LUE: Shoulder flexion to 90 degrees; Elbow, wrist, hand WFL  Strength RLE  Strength RLE: Exception  R Hip Flexion: 4-/5  R Knee Flexion: 4-/5  R Knee Extension: 4-/5  R Ankle Dorsiflexion: 4-/5  R Ankle Plantar flexion: 4-/5  Strength LLE  Strength LLE: Exception  L Hip Flexion: 4-/5  L Knee Flexion: 4-/5  L Knee Extension: 4-/5  L Ankle Dorsiflexion: 4-/5  L Ankle Plantar Flexion: 4-/5  Strength RUE  Strength RUE: Exception  R Shoulder Flexion: 4-/5  R Elbow Flexion: 4-/5  R Elbow Extension: 4-/5  R Wrist Flexion: 4-/5  R Wrist Extension: 4-/5  Strength LUE  Strength LUE: Exception  L Shoulder Flexion: 4-/5  L Elbow Flexion: 4-/5  L Elbow Extension: 4-/5  L Wrist Flexion: 4-/5  L Wrist Extension: 4-/5           Bed mobility  Supine to Sit: Moderate assistance (assistance for trunk and LE progression)  Sit to Supine:  (did not assess- pt seated in bedside chair upon writer's exit)  Scooting: Moderate assistance  Bed Mobility Comments: Increased time to complete with VCs for sequencing  Transfers  Sit to Stand: Maximum Assistance; Moderate Assistance  Stand to Sit: Maximum Assistance; Moderate Assistance  Comment: STS performed x2 with RW; pt required maxA to perform first STS transfer, Alonzo Hunter for second sit to stand transfer. Pt reports persistent dizziness throughout session. Ambulation  Surface: Level tile  Device: Rolling Walker  Assistance:  Moderate assistance  Quality of Gait: high reliance on RW  Gait Deviations: Slow Dede; Shuffles  Distance: 5ft  Comments: Pt with significant loss of balance while ambulating to bedside chair, pt requiring maxA to safely lower to bedside chair with assistance of sitter in room. Pt with reports of nausea following ambulation, RN notified and aware. /79 following ambulation. More Ambulation?: No  Stairs/Curb  Stairs?: No     Balance  Posture: Fair  Sitting - Static: Fair;+  Sitting - Dynamic: Fair;-  Standing - Static: Poor;+  Standing - Dynamic: Poor;-  Comments: standing balance assessed w/ RW; pt required CGA- modA to sit EOB    AM-PAC Score  AM-PAC Inpatient Mobility Raw Score : 12 (12/07/22 1621)  AM-PAC Inpatient T-Scale Score : 35.33 (12/07/22 1621)  Mobility Inpatient CMS 0-100% Score: 68.66 (12/07/22 1621)  Mobility Inpatient CMS G-Code Modifier : CL (12/07/22 1621)    Goals  Short Term Goals  Time Frame for Short Term Goals: 14  Short Term Goal 1: Pt to perform bed mobility with supervision  Short Term Goal 2: Pt to demonstrate functional transfers CGA  Short Term Goal 3: Pt to ambulate 150ft w/ RW Otilia  Short Term Goal 4: Tolerate 30 minutes of therapy to demo increased endurance  Patient Goals   Patient Goals : To get stronger       Education  Patient Education  Education Given To: Patient  Education Provided: Role of Therapy;Plan of Care;Equipment; Fall Prevention Strategies  Education Method: Demonstration  Barriers to Learning: Cognition  Education Outcome: Verbalized understanding;Demonstrated understanding;Continued education needed      Therapy Time   Individual Concurrent Group Co-treatment   Time In 1073         Time Out 1446         Minutes 39         Timed Code Treatment Minutes: 25 Minutes       Shira Quinn, PT

## 2022-12-07 NOTE — PLAN OF CARE
Problem: Discharge Planning  Goal: Discharge to home or other facility with appropriate resources  12/7/2022 0455 by Gladys Villarreal RN  Outcome: Progressing  12/6/2022 2239 by Gladys Villarreal RN  Outcome: Progressing     Problem: Confusion  Goal: Confusion, delirium, dementia, or psychosis is improved or at baseline  Description: INTERVENTIONS:  1. Assess for possible contributors to thought disturbance, including medications, impaired vision or hearing, underlying metabolic abnormalities, dehydration, psychiatric diagnoses, and notify attending LIP  2. [place] high risk fall precautions, as indicated  3. Provide frequent short contacts to provide reality reorientation, refocusing and direction  4. Decrease environmental stimuli, including noise as appropriate  5. Monitor and intervene to maintain adequate nutrition, hydration, elimination, sleep and activity  6. If unable to ensure safety without constant attention obtain sitter and review sitter guidelines with assigned personnel  7.  Initiate Psychosocial CNS and Spiritual Care consult, as indicated  12/7/2022 0455 by Gladys Villarreal RN  Outcome: Progressing  12/6/2022 2239 by Gladys Villarreal RN  Outcome: Progressing  12/6/2022 1816 by Layne Cormier RN  Outcome: Progressing     Problem: Pain  Goal: Verbalizes/displays adequate comfort level or baseline comfort level  12/7/2022 0455 by Gladys Villarreal RN  Outcome: Progressing  12/6/2022 2239 by Gladys Villarreal RN  Outcome: Progressing  12/6/2022 1816 by Layne Cormier RN  Outcome: Progressing     Problem: Cardiovascular - Adult  Goal: Maintains optimal cardiac output and hemodynamic stability  12/7/2022 0455 by Gladys Villarreal RN  Outcome: Progressing  12/6/2022 2239 by Gladys Villarreal RN  Outcome: Progressing  12/6/2022 1816 by Layne Cormier RN  Outcome: Progressing  Goal: Absence of cardiac dysrhythmias or at baseline  12/7/2022 0455 by Gladys Villarreal RN  Outcome: Progressing  12/6/2022 2239 by Gladys Villarreal RN  Outcome: Progressing  12/6/2022 1816 by Layne Cormier RN  Outcome: Progressing     Problem: Gastrointestinal - Adult  Goal: Minimal or absence of nausea and vomiting  12/7/2022 0455 by Gladys Villarreal RN  Outcome: Progressing  12/6/2022 2239 by Gladys Villarreal RN  Outcome: Progressing  12/6/2022 1816 by Layne Cormier RN  Outcome: Progressing  Goal: Maintains or returns to baseline bowel function  12/7/2022 0455 by Gladys Villarreal RN  Outcome: Progressing  12/6/2022 2239 by Gladys Villarreal RN  Outcome: Progressing  12/6/2022 1816 by Layne Cormier RN  Outcome: Progressing  Goal: Maintains adequate nutritional intake  12/7/2022 0455 by Gladys Villarreal RN  Outcome: Progressing  12/6/2022 2239 by Gladys Villarreal RN  Outcome: Progressing  12/6/2022 1816 by Layne Cormier RN  Outcome: Progressing  Goal: Establish and maintain optimal ostomy function  12/7/2022 0455 by Gladys Villarreal RN  Outcome: Progressing  12/6/2022 2239 by Gladys Villarreal RN  Outcome: Progressing  12/6/2022 1816 by Layne Cormier RN  Outcome: Progressing     Problem: Respiratory - Adult  Goal: Achieves optimal ventilation and oxygenation  12/7/2022 0455 by Gladys Villarreal RN  Outcome: Progressing  12/6/2022 2239 by Gladys Villarreal RN  Outcome: Progressing  12/6/2022 1816 by Layne Cormier RN  Outcome: Progressing  Flowsheets (Taken 12/6/2022 0800)  Achieves optimal ventilation and oxygenation: Assess for changes in respiratory status     Problem: Nutrition Deficit:  Goal: Optimize nutritional status  12/7/2022 0455 by Gladsy Villarreal RN  Outcome: Progressing  12/6/2022 2239 by Gladys Villarreal RN  Outcome: Progressing  12/6/2022 1816 by Layne Cormier RN  Outcome: Progressing     Problem: Skin/Tissue Integrity  Goal: Absence of new skin breakdown  Description: 1. Monitor for areas of redness and/or skin breakdown  2. Assess vascular access sites hourly  3. Every 4-6 hours minimum:  Change oxygen saturation probe site  4.   Every 4-6 hours:  If on nasal continuous positive airway pressure, respiratory therapy assess nares and determine need for appliance change or resting period.   12/7/2022 0455 by Talia Merritt RN  Outcome: Progressing  12/6/2022 2239 by Talia Merritt RN  Outcome: Progressing  12/6/2022 1816 by Ishan Daniel RN  Outcome: Progressing     Problem: Safety - Adult  Goal: Free from fall injury  12/7/2022 0455 by Talia Merritt RN  Outcome: Progressing  12/6/2022 2239 by Talia Merritt RN  Outcome: Progressing  12/6/2022 1816 by Ishan Daniel RN  Outcome: Progressing     Problem: ABCDS Injury Assessment  Goal: Absence of physical injury  12/7/2022 0455 by Talia Merritt RN  Outcome: Progressing  12/6/2022 2239 by Talia Merritt RN  Outcome: Progressing  12/6/2022 1816 by Ishan Daniel RN  Outcome: Progressing     Problem: Neurosensory - Adult  Goal: Achieves stable or improved neurological status  12/7/2022 0455 by Talia Merritt RN  Outcome: Progressing  12/6/2022 2239 by Talia Merritt RN  Outcome: Progressing  12/6/2022 1816 by Ishan Daniel RN  Outcome: Progressing  Goal: Absence of seizures  12/7/2022 0455 by Talia Merritt RN  Outcome: Progressing  12/6/2022 2239 by Talia Merritt RN  Outcome: Progressing  12/6/2022 1816 by Ishan Daniel RN  Outcome: Progressing  Goal: Remains free of injury related to seizures activity  12/7/2022 0455 by Talia Merritt RN  Outcome: Progressing  12/6/2022 2239 by Talia Merritt RN  Outcome: Progressing  12/6/2022 1816 by Ishan Daniel RN  Outcome: Progressing  Goal: Achieves maximal functionality and self care  12/7/2022 0455 by Talia Merritt RN  Outcome: Progressing  12/6/2022 2239 by Talia Merritt RN  Outcome: Progressing  12/6/2022 1816 by Ihsan Daniel RN  Outcome: Progressing     Problem: Skin/Tissue Integrity - Adult  Goal: Skin integrity remains intact  12/7/2022 0455 by Talia Merritt RN  Outcome: Progressing  12/6/2022 2239 by Talia Merritt RN  Outcome: Progressing  12/6/2022 1816 by Romana Patch, RN  Outcome: Progressing  Flowsheets (Taken 12/6/2022 0800)  Skin Integrity Remains Intact: Monitor for areas of redness and/or skin breakdown  Goal: Incisions, wounds, or drain sites healing without S/S of infection  12/7/2022 0455 by Vazquez Bond, RN  Outcome: Progressing  12/6/2022 2239 by Vazquez Bond, RN  Outcome: Progressing  12/6/2022 1816 by Romana Patch, RN  Outcome: Progressing  Goal: Oral mucous membranes remain intact  12/7/2022 0455 by Vazquez Bond, RN  Outcome: Progressing  12/6/2022 2239 by Vazquez Bond, RN  Outcome: Progressing  12/6/2022 1816 by Romana Patch, RN  Outcome: Progressing

## 2022-12-07 NOTE — PROGRESS NOTES
Pt Mentality    Had lengthy conversation about admission. Pt thought he was at [facility] for his CABG. Reoriented pt about reason for admission and that he was at [facility] [place]. Jolly Informed pt about past 7 days including being intubated and assured pt that confusion of last 7 days was ok and to be expected d/t intubation. Educated pt on why his throat hurts (vent) and why he is having urine retention (felix). Pt stated he understood and writer reassured pt that we are able to reorient and reeducated whenever he needs. Pt voiced understanding, is current calm and pleasant.

## 2022-12-07 NOTE — TRANSFER CENTER NOTE
I have received a formal sign out on this patient. Plans for downgrade to medical floor for further stabilization and mgt.

## 2022-12-08 ENCOUNTER — APPOINTMENT (OUTPATIENT)
Dept: GENERAL RADIOLOGY | Age: 77
DRG: 377 | End: 2022-12-08
Attending: INTERNAL MEDICINE
Payer: MEDICARE

## 2022-12-08 PROBLEM — I24.8 DEMAND ISCHEMIA (HCC): Status: ACTIVE | Noted: 2022-12-08

## 2022-12-08 PROBLEM — R57.8 HEMORRHAGIC SHOCK (HCC): Status: ACTIVE | Noted: 2022-12-08

## 2022-12-08 PROBLEM — I24.89 DEMAND ISCHEMIA: Status: ACTIVE | Noted: 2022-12-08

## 2022-12-08 PROBLEM — R42 ORTHOSTATIC DIZZINESS: Status: ACTIVE | Noted: 2022-12-08

## 2022-12-08 PROBLEM — R53.83 LETHARGY: Status: ACTIVE | Noted: 2022-12-08

## 2022-12-08 PROBLEM — E11.65 TYPE 2 DIABETES MELLITUS WITH HYPERGLYCEMIA, WITH LONG-TERM CURRENT USE OF INSULIN (HCC): Status: ACTIVE | Noted: 2022-12-07

## 2022-12-08 LAB
ABSOLUTE EOS #: 0.09 K/UL (ref 0–0.44)
ABSOLUTE IMMATURE GRANULOCYTE: 0.1 K/UL (ref 0–0.3)
ABSOLUTE LYMPH #: 1.07 K/UL (ref 1.1–3.7)
ABSOLUTE MONO #: 0.09 K/UL (ref 0.1–1.2)
ANION GAP SERPL CALCULATED.3IONS-SCNC: 12 MMOL/L (ref 9–17)
ANION GAP SERPL CALCULATED.3IONS-SCNC: 14 MMOL/L (ref 9–17)
BASOPHILS # BLD: 0 % (ref 0–2)
BASOPHILS ABSOLUTE: <0.03 K/UL (ref 0–0.2)
BUN BLDV-MCNC: 12 MG/DL (ref 8–23)
BUN BLDV-MCNC: 13 MG/DL (ref 8–23)
CALCIUM SERPL-MCNC: 8 MG/DL (ref 8.6–10.4)
CALCIUM SERPL-MCNC: 8.3 MG/DL (ref 8.6–10.4)
CHLORIDE BLD-SCNC: 103 MMOL/L (ref 98–107)
CHLORIDE BLD-SCNC: 103 MMOL/L (ref 98–107)
CO2: 23 MMOL/L (ref 20–31)
CO2: 25 MMOL/L (ref 20–31)
CREAT SERPL-MCNC: 1.13 MG/DL (ref 0.7–1.2)
CREAT SERPL-MCNC: 1.14 MG/DL (ref 0.7–1.2)
CULTURE: NORMAL
CULTURE: NORMAL
EOSINOPHILS RELATIVE PERCENT: 1 % (ref 1–4)
FERRITIN: 241 NG/ML (ref 30–400)
FIBRINOGEN: 541 MG/DL (ref 140–420)
GFR SERPL CREATININE-BSD FRML MDRD: >60 ML/MIN/1.73M2
GFR SERPL CREATININE-BSD FRML MDRD: >60 ML/MIN/1.73M2
GLUCOSE BLD-MCNC: 214 MG/DL (ref 75–110)
GLUCOSE BLD-MCNC: 233 MG/DL (ref 70–99)
GLUCOSE BLD-MCNC: 269 MG/DL (ref 75–110)
GLUCOSE BLD-MCNC: 272 MG/DL (ref 75–110)
GLUCOSE BLD-MCNC: 298 MG/DL (ref 70–99)
HAPTOGLOBIN: 331 MG/DL (ref 30–200)
HCT VFR BLD CALC: 25.6 % (ref 40.7–50.3)
HCT VFR BLD CALC: 30.3 % (ref 40.7–50.3)
HEMOGLOBIN: 7.9 G/DL (ref 13–17)
HEMOGLOBIN: 9.5 G/DL (ref 13–17)
IMMATURE GRANULOCYTES: 2 %
INR BLD: 1.1
IRON SATURATION: 16 % (ref 20–55)
IRON: 29 UG/DL (ref 59–158)
LACTATE DEHYDROGENASE: 266 U/L (ref 135–225)
LYMPHOCYTES # BLD: 17 % (ref 24–43)
Lab: NORMAL
Lab: NORMAL
MAGNESIUM: 1.9 MG/DL (ref 1.6–2.6)
MAGNESIUM: 1.9 MG/DL (ref 1.6–2.6)
MCH RBC QN AUTO: 28.5 PG (ref 25.2–33.5)
MCH RBC QN AUTO: 28.5 PG (ref 25.2–33.5)
MCHC RBC AUTO-ENTMCNC: 30.9 G/DL (ref 28.4–34.8)
MCHC RBC AUTO-ENTMCNC: 31.4 G/DL (ref 28.4–34.8)
MCV RBC AUTO: 91 FL (ref 82.6–102.9)
MCV RBC AUTO: 92.4 FL (ref 82.6–102.9)
MONOCYTES # BLD: 1 % (ref 3–12)
NRBC AUTOMATED: 0 PER 100 WBC
NRBC AUTOMATED: 0 PER 100 WBC
PDW BLD-RTO: 15.9 % (ref 11.8–14.4)
PDW BLD-RTO: 16.2 % (ref 11.8–14.4)
PLATELET # BLD: 320 K/UL (ref 138–453)
PLATELET # BLD: 326 K/UL (ref 138–453)
PMV BLD AUTO: 10.1 FL (ref 8.1–13.5)
PMV BLD AUTO: 9.7 FL (ref 8.1–13.5)
POTASSIUM SERPL-SCNC: 3.5 MMOL/L (ref 3.7–5.3)
PROTHROMBIN TIME: 12 SEC (ref 9.1–12.3)
RBC # BLD: 2.77 M/UL (ref 4.21–5.77)
RBC # BLD: 3.33 M/UL (ref 4.21–5.77)
RBC # BLD: ABNORMAL 10*6/UL
SEG NEUTROPHILS: 78 % (ref 36–65)
SEGMENTED NEUTROPHILS ABSOLUTE COUNT: 4.85 K/UL (ref 1.5–8.1)
SODIUM BLD-SCNC: 140 MMOL/L (ref 135–144)
SODIUM BLD-SCNC: 140 MMOL/L (ref 135–144)
SPECIMEN DESCRIPTION: NORMAL
SPECIMEN DESCRIPTION: NORMAL
TOTAL IRON BINDING CAPACITY: 187 UG/DL (ref 250–450)
UNSATURATED IRON BINDING CAPACITY: 158 UG/DL (ref 112–347)
WBC # BLD: 6 K/UL (ref 3.5–11.3)
WBC # BLD: 6.2 K/UL (ref 3.5–11.3)

## 2022-12-08 PROCEDURE — C9113 INJ PANTOPRAZOLE SODIUM, VIA: HCPCS | Performed by: STUDENT IN AN ORGANIZED HEALTH CARE EDUCATION/TRAINING PROGRAM

## 2022-12-08 PROCEDURE — 2060000000 HC ICU INTERMEDIATE R&B

## 2022-12-08 PROCEDURE — 86900 BLOOD TYPING SEROLOGIC ABO: CPT

## 2022-12-08 PROCEDURE — 80048 BASIC METABOLIC PNL TOTAL CA: CPT

## 2022-12-08 PROCEDURE — 85384 FIBRINOGEN ACTIVITY: CPT

## 2022-12-08 PROCEDURE — 6360000002 HC RX W HCPCS: Performed by: NURSE PRACTITIONER

## 2022-12-08 PROCEDURE — 51701 INSERT BLADDER CATHETER: CPT

## 2022-12-08 PROCEDURE — 85610 PROTHROMBIN TIME: CPT

## 2022-12-08 PROCEDURE — 2580000003 HC RX 258: Performed by: STUDENT IN AN ORGANIZED HEALTH CARE EDUCATION/TRAINING PROGRAM

## 2022-12-08 PROCEDURE — 86078 PHYS BLOOD BANK SERV REACTJ: CPT

## 2022-12-08 PROCEDURE — 6370000000 HC RX 637 (ALT 250 FOR IP): Performed by: STUDENT IN AN ORGANIZED HEALTH CARE EDUCATION/TRAINING PROGRAM

## 2022-12-08 PROCEDURE — 82728 ASSAY OF FERRITIN: CPT

## 2022-12-08 PROCEDURE — 6360000002 HC RX W HCPCS: Performed by: INTERNAL MEDICINE

## 2022-12-08 PROCEDURE — 84132 ASSAY OF SERUM POTASSIUM: CPT

## 2022-12-08 PROCEDURE — 83615 LACTATE (LD) (LDH) ENZYME: CPT

## 2022-12-08 PROCEDURE — 6360000002 HC RX W HCPCS: Performed by: STUDENT IN AN ORGANIZED HEALTH CARE EDUCATION/TRAINING PROGRAM

## 2022-12-08 PROCEDURE — 86880 COOMBS TEST DIRECT: CPT

## 2022-12-08 PROCEDURE — 86901 BLOOD TYPING SEROLOGIC RH(D): CPT

## 2022-12-08 PROCEDURE — 6370000000 HC RX 637 (ALT 250 FOR IP): Performed by: INTERNAL MEDICINE

## 2022-12-08 PROCEDURE — 2580000003 HC RX 258: Performed by: INTERNAL MEDICINE

## 2022-12-08 PROCEDURE — A4216 STERILE WATER/SALINE, 10 ML: HCPCS | Performed by: INTERNAL MEDICINE

## 2022-12-08 PROCEDURE — 97112 NEUROMUSCULAR REEDUCATION: CPT

## 2022-12-08 PROCEDURE — 97530 THERAPEUTIC ACTIVITIES: CPT

## 2022-12-08 PROCEDURE — 2500000003 HC RX 250 WO HCPCS

## 2022-12-08 PROCEDURE — 82947 ASSAY GLUCOSE BLOOD QUANT: CPT

## 2022-12-08 PROCEDURE — 83550 IRON BINDING TEST: CPT

## 2022-12-08 PROCEDURE — 85027 COMPLETE CBC AUTOMATED: CPT

## 2022-12-08 PROCEDURE — 86850 RBC ANTIBODY SCREEN: CPT

## 2022-12-08 PROCEDURE — 6360000002 HC RX W HCPCS

## 2022-12-08 PROCEDURE — 36430 TRANSFUSION BLD/BLD COMPNT: CPT

## 2022-12-08 PROCEDURE — 2500000003 HC RX 250 WO HCPCS: Performed by: INTERNAL MEDICINE

## 2022-12-08 PROCEDURE — 93005 ELECTROCARDIOGRAM TRACING: CPT | Performed by: INTERNAL MEDICINE

## 2022-12-08 PROCEDURE — 36415 COLL VENOUS BLD VENIPUNCTURE: CPT

## 2022-12-08 PROCEDURE — P9016 RBC LEUKOCYTES REDUCED: HCPCS

## 2022-12-08 PROCEDURE — 51798 US URINE CAPACITY MEASURE: CPT

## 2022-12-08 PROCEDURE — 86920 COMPATIBILITY TEST SPIN: CPT

## 2022-12-08 PROCEDURE — 94660 CPAP INITIATION&MGMT: CPT

## 2022-12-08 PROCEDURE — 71045 X-RAY EXAM CHEST 1 VIEW: CPT

## 2022-12-08 PROCEDURE — 97116 GAIT TRAINING THERAPY: CPT

## 2022-12-08 PROCEDURE — 83735 ASSAY OF MAGNESIUM: CPT

## 2022-12-08 PROCEDURE — 99232 SBSQ HOSP IP/OBS MODERATE 35: CPT | Performed by: INTERNAL MEDICINE

## 2022-12-08 PROCEDURE — 85025 COMPLETE CBC W/AUTO DIFF WBC: CPT

## 2022-12-08 PROCEDURE — 83540 ASSAY OF IRON: CPT

## 2022-12-08 PROCEDURE — 83010 ASSAY OF HAPTOGLOBIN QUANT: CPT

## 2022-12-08 RX ORDER — DIPHENHYDRAMINE HYDROCHLORIDE 50 MG/ML
50 INJECTION INTRAMUSCULAR; INTRAVENOUS ONCE
Status: COMPLETED | OUTPATIENT
Start: 2022-12-08 | End: 2022-12-08

## 2022-12-08 RX ORDER — SODIUM CHLORIDE 9 MG/ML
INJECTION, SOLUTION INTRAVENOUS PRN
Status: COMPLETED | OUTPATIENT
Start: 2022-12-08 | End: 2022-12-13

## 2022-12-08 RX ORDER — METOPROLOL TARTRATE 5 MG/5ML
INJECTION INTRAVENOUS
Status: COMPLETED
Start: 2022-12-08 | End: 2022-12-08

## 2022-12-08 RX ORDER — SODIUM CHLORIDE, SODIUM LACTATE, POTASSIUM CHLORIDE, CALCIUM CHLORIDE 600; 310; 30; 20 MG/100ML; MG/100ML; MG/100ML; MG/100ML
INJECTION, SOLUTION INTRAVENOUS CONTINUOUS
Status: DISCONTINUED | OUTPATIENT
Start: 2022-12-08 | End: 2022-12-08

## 2022-12-08 RX ORDER — ROSUVASTATIN CALCIUM 5 MG/1
5 TABLET, COATED ORAL NIGHTLY
Status: DISCONTINUED | OUTPATIENT
Start: 2022-12-08 | End: 2022-12-08

## 2022-12-08 RX ORDER — METOPROLOL TARTRATE 5 MG/5ML
5 INJECTION INTRAVENOUS ONCE
Status: COMPLETED | OUTPATIENT
Start: 2022-12-08 | End: 2022-12-08

## 2022-12-08 RX ORDER — LORAZEPAM 2 MG/ML
1 INJECTION INTRAMUSCULAR ONCE
Status: COMPLETED | OUTPATIENT
Start: 2022-12-09 | End: 2022-12-08

## 2022-12-08 RX ORDER — INSULIN GLARGINE 100 [IU]/ML
40 INJECTION, SOLUTION SUBCUTANEOUS NIGHTLY
Status: DISCONTINUED | OUTPATIENT
Start: 2022-12-09 | End: 2022-12-09 | Stop reason: SDUPTHER

## 2022-12-08 RX ORDER — FUROSEMIDE 10 MG/ML
80 INJECTION INTRAMUSCULAR; INTRAVENOUS ONCE
Status: COMPLETED | OUTPATIENT
Start: 2022-12-08 | End: 2022-12-08

## 2022-12-08 RX ORDER — EZETIMIBE 10 MG/1
10 TABLET ORAL NIGHTLY
Status: DISCONTINUED | OUTPATIENT
Start: 2022-12-08 | End: 2022-12-14 | Stop reason: HOSPADM

## 2022-12-08 RX ADMIN — METOPROLOL TARTRATE 5 MG: 1 INJECTION, SOLUTION INTRAVENOUS at 13:49

## 2022-12-08 RX ADMIN — LORAZEPAM 1 MG: 2 INJECTION INTRAMUSCULAR; INTRAVENOUS at 23:44

## 2022-12-08 RX ADMIN — HEPARIN SODIUM 5000 UNITS: 5000 INJECTION INTRAVENOUS; SUBCUTANEOUS at 04:58

## 2022-12-08 RX ADMIN — INSULIN LISPRO 2 UNITS: 100 INJECTION, SOLUTION INTRAVENOUS; SUBCUTANEOUS at 12:37

## 2022-12-08 RX ADMIN — SODIUM CHLORIDE, PRESERVATIVE FREE 20 MG: 5 INJECTION INTRAVENOUS at 13:50

## 2022-12-08 RX ADMIN — METOPROLOL TARTRATE 5 MG: 1 INJECTION, SOLUTION INTRAVENOUS at 13:40

## 2022-12-08 RX ADMIN — POLYMYXIN B SULFATE, BACITRACIN ZINC, NEOMYCIN SULFATE: 5000; 3.5; 4 OINTMENT TOPICAL at 08:48

## 2022-12-08 RX ADMIN — DIPHENHYDRAMINE HYDROCHLORIDE 50 MG: 50 INJECTION, SOLUTION INTRAMUSCULAR; INTRAVENOUS at 13:45

## 2022-12-08 RX ADMIN — POTASSIUM CHLORIDE 10 MEQ: 7.46 INJECTION, SOLUTION INTRAVENOUS at 08:58

## 2022-12-08 RX ADMIN — EZETIMIBE 10 MG: 10 TABLET ORAL at 20:58

## 2022-12-08 RX ADMIN — SODIUM CHLORIDE, PRESERVATIVE FREE 40 MG: 5 INJECTION INTRAVENOUS at 15:47

## 2022-12-08 RX ADMIN — POTASSIUM CHLORIDE 10 MEQ: 7.46 INJECTION, SOLUTION INTRAVENOUS at 11:57

## 2022-12-08 RX ADMIN — INSULIN LISPRO 2 UNITS: 100 INJECTION, SOLUTION INTRAVENOUS; SUBCUTANEOUS at 08:49

## 2022-12-08 RX ADMIN — SODIUM CHLORIDE, PRESERVATIVE FREE 20 MG: 5 INJECTION INTRAVENOUS at 20:58

## 2022-12-08 RX ADMIN — POTASSIUM CHLORIDE 10 MEQ: 7.46 INJECTION, SOLUTION INTRAVENOUS at 06:39

## 2022-12-08 RX ADMIN — LORAZEPAM 1 MG: 2 INJECTION INTRAMUSCULAR; INTRAVENOUS at 02:49

## 2022-12-08 RX ADMIN — POLYMYXIN B SULFATE, BACITRACIN ZINC, NEOMYCIN SULFATE: 5000; 3.5; 4 OINTMENT TOPICAL at 20:55

## 2022-12-08 RX ADMIN — FUROSEMIDE 80 MG: 10 INJECTION, SOLUTION INTRAMUSCULAR; INTRAVENOUS at 13:51

## 2022-12-08 RX ADMIN — HEPARIN SODIUM 5000 UNITS: 5000 INJECTION INTRAVENOUS; SUBCUTANEOUS at 21:12

## 2022-12-08 RX ADMIN — POTASSIUM CHLORIDE 10 MEQ: 7.46 INJECTION, SOLUTION INTRAVENOUS at 10:35

## 2022-12-08 RX ADMIN — TAMSULOSIN HYDROCHLORIDE 0.4 MG: 0.4 CAPSULE ORAL at 08:47

## 2022-12-08 RX ADMIN — AMIODARONE HYDROCHLORIDE 1 MG/MIN: 50 INJECTION, SOLUTION INTRAVENOUS at 15:45

## 2022-12-08 RX ADMIN — SODIUM CHLORIDE, POTASSIUM CHLORIDE, SODIUM LACTATE AND CALCIUM CHLORIDE: 600; 310; 30; 20 INJECTION, SOLUTION INTRAVENOUS at 13:06

## 2022-12-08 RX ADMIN — ASPIRIN 81 MG: 81 TABLET, CHEWABLE ORAL at 08:47

## 2022-12-08 RX ADMIN — HEPARIN SODIUM 5000 UNITS: 5000 INJECTION INTRAVENOUS; SUBCUTANEOUS at 15:47

## 2022-12-08 RX ADMIN — SODIUM CHLORIDE, PRESERVATIVE FREE 40 MG: 5 INJECTION INTRAVENOUS at 04:57

## 2022-12-08 RX ADMIN — AMIODARONE HYDROCHLORIDE 200 MG: 200 TABLET ORAL at 08:47

## 2022-12-08 RX ADMIN — INSULIN GLARGINE 15 UNITS: 100 INJECTION, SOLUTION SUBCUTANEOUS at 08:48

## 2022-12-08 RX ADMIN — INSULIN LISPRO 2 UNITS: 100 INJECTION, SOLUTION INTRAVENOUS; SUBCUTANEOUS at 17:46

## 2022-12-08 RX ADMIN — METOPROLOL TARTRATE 5 MG: 5 INJECTION INTRAVENOUS at 13:49

## 2022-12-08 RX ADMIN — AMIODARONE HYDROCHLORIDE 0.5 MG/MIN: 50 INJECTION, SOLUTION INTRAVENOUS at 23:04

## 2022-12-08 ASSESSMENT — PAIN SCALES - WONG BAKER: WONGBAKER_NUMERICALRESPONSE: 0

## 2022-12-08 ASSESSMENT — PAIN SCALES - GENERAL: PAINLEVEL_OUTOF10: 0

## 2022-12-08 NOTE — PLAN OF CARE
Problem: Discharge Planning  Goal: Discharge to home or other facility with appropriate resources  12/8/2022 0021 by Leopoldo Inches, RN  Outcome: Progressing     Problem: Confusion  Goal: Confusion, delirium, dementia, or psychosis is improved or at baseline  Description: INTERVENTIONS:  1. Assess for possible contributors to thought disturbance, including medications, impaired vision or hearing, underlying metabolic abnormalities, dehydration, psychiatric diagnoses, and notify attending LIP  2. [place] high risk fall precautions, as indicated  3. Provide frequent short contacts to provide reality reorientation, refocusing and direction  4. Decrease environmental stimuli, including noise as appropriate  5. Monitor and intervene to maintain adequate nutrition, hydration, elimination, sleep and activity  6. If unable to ensure safety without constant attention obtain sitter and review sitter guidelines with assigned personnel  7.  Initiate Psychosocial CNS and Spiritual Care consult, as indicated  12/8/2022 0021 by Leopoldo Inches, RN  Outcome: Progressing     Problem: Pain  Goal: Verbalizes/displays adequate comfort level or baseline comfort level  12/8/2022 0021 by Leopoldo Inches, RN  Outcome: Progressing     Problem: Cardiovascular - Adult  Goal: Maintains optimal cardiac output and hemodynamic stability  12/8/2022 0021 by Leopoldo Inches, RN  Outcome: Progressing     Problem: Cardiovascular - Adult  Goal: Absence of cardiac dysrhythmias or at baseline  12/8/2022 0021 by Leopoldo Inches, RN  Outcome: Progressing     Problem: Gastrointestinal - Adult  Goal: Minimal or absence of nausea and vomiting  12/8/2022 0021 by Leopoldo Inches, RN  Outcome: Progressing     Problem: Gastrointestinal - Adult  Goal: Maintains or returns to baseline bowel function  12/8/2022 0021 by Leopoldo Inches, RN  Outcome: Progressing     Problem: Gastrointestinal - Adult  Goal: Maintains adequate nutritional intake  12/8/2022 0021 by Vanetta Sandhoff, RN  Outcome: Progressing     Problem: Gastrointestinal - Adult  Goal: Establish and maintain optimal ostomy function  12/8/2022 0021 by Vanetta Sandhoff, RN  Outcome: Progressing     Problem: Respiratory - Adult  Goal: Achieves optimal ventilation and oxygenation  12/8/2022 0021 by Vanetta Sandhoff, RN  Outcome: Progressing     Problem: Neurosensory - Adult  Goal: Achieves stable or improved neurological status  12/8/2022 0021 by Vanetta Sandhoff, RN  Outcome: Progressing     Problem: Neurosensory - Adult  Goal: Absence of seizures  12/8/2022 0021 by Vanetta Sandhoff, RN  Outcome: Progressing     Problem: Neurosensory - Adult  Goal: Remains free of injury related to seizures activity  12/8/2022 0021 by Vanetta Sandhoff, RN  Outcome: Progressing     Problem: Neurosensory - Adult  Goal: Achieves maximal functionality and self care  12/8/2022 0021 by Vanetta Sandhoff, RN  Outcome: Progressing     Problem: Skin/Tissue Integrity - Adult  Goal: Incisions, wounds, or drain sites healing without S/S of infection  12/8/2022 0021 by Vanetta Sandhoff, RN  Outcome: Progressing     Problem: Skin/Tissue Integrity - Adult  Goal: Oral mucous membranes remain intact  12/8/2022 0021 by Vanetta Sandhoff, RN  Outcome: Progressing     Problem: Nutrition Deficit:  Goal: Optimize nutritional status  12/8/2022 0021 by Vanetta Sandhoff, RN  Outcome: Progressing     Problem: Skin/Tissue Integrity  Goal: Absence of new skin breakdown  Description: 1. Monitor for areas of redness and/or skin breakdown  2. Assess vascular access sites hourly  3. Every 4-6 hours minimum:  Change oxygen saturation probe site  4. Every 4-6 hours:  If on nasal continuous positive airway pressure, respiratory therapy assess nares and determine need for appliance change or resting period.   12/8/2022 0021 by Vanetta Sandhoff, RN  Outcome: Progressing     Problem: Safety - Adult  Goal: Free from fall injury  12/8/2022 0021 by Frank Dacosta Julian Bailey RN  Outcome: Progressing     Problem: ABCDS Injury Assessment  Goal: Absence of physical injury  12/8/2022 0021 by Mitchell Bright RN  Outcome: Progressing

## 2022-12-08 NOTE — PROGRESS NOTES
[facility]  Office: [address], DO, Ngoc Arcenio, DO, Amandakailee Barr, DO, Vishal Live Blood, DO, Cristopher Kilgore MD, Michelet Malone MD, Marcelino Villanueva MD, Mohit Jimenez MD,  Geovanny Gutierrez MD, Beatriz Mendoza MD, Amanda Worthy, DO, Ceasar Mayer MD,  Bernardo Jaimes MD, Tray Hatfield MD, Rama Gauthier, DO, Manasa Dale MD, Alexa Lr MD, Joselin Santiago DO, Deandre Ramirez MD, Janette Quispe MD, Stephanie Glover MD, Ruddy Quiroz MD, Mayelin Faulkner DO, Su Benson MD, Arash Casillas MD, Adrien Barron, Goldie Aldrich, CNP, Rosalie Loyd, CNP, Silvia Vogel, CNP,  Fina Beckman, [facility], Cydney Flores, CNP, Alissa Comer, CNP, Rubina Nash, CNP, Hugo Burns, CNP, Blanka Flower, CNP, Romina Torres PATiffanyC, Art Child, [facility], Ismael Leal, [facility], Jose Saravia, [facility]    Progress Note    12/8/2022    9:44 AM    Name:   Jamaal Recinos  MRN:     [id number]     [place]:      [de-identified]   Room:   [address] [place] [address] Day:  9  Admit Date:  11/29/2022 12:20 PM    PCP:   Iesha Apt  Code Status:  Full Code    Subjective:     C/C: bleeding  Interval History Status: not changed    Pt in bed, appears confused. Wife is at bedside. He does have some dizziness and light headedness especially when moving. Hgb dropped overnight by 1 gram. He is tachycardic, stools still dark. Otherwise no nausea, vomiting, diarrhea. No Afib. Glucose uncontrolled. Brief History:     Pt is a 68 yr old  male with a PMH CAD s/p CABG 10/25/22, A.fib on Coumadin, DM2, HTN who was admitted on at an outlying hospital for painless rectal bleeding per records. Hb 6.8 dropped, and he required transfusions. His INR was reversed with FFP, cryo, Kcentra and Vit K. Patient had an EGD on 11/28/22 that showed duodenitis and a duodenal ulcer, non bleeding.   Patient had hematemesis several times, and a repeat EGD showed old blood but no active bleeding, the ulcer had Epinephrine injected. He was subsequently intubated for resp failure and transferred to [facility]. [facility]. He did require pressors due to hypotension and was hypovolemic. He has since been extubated in the ICU and only wears Bipap at night. He still has dark, tarry stools, but unchanged from before. He is on Protonix 40mg BID. Cardiology was consulted and restarted his ASA. He was also treated for pneumonia in the ICU. He was up with PT/OT today became lightheaded. He denies SOB. His family is present and encouraging him to eat dinner. He is on thickened liquids and doesn't like them. He has no appetite and doesn't want to eat. + weakness. Review of Systems:     Constitutional:  negative for chills, fevers, sweats +fatigue  Respiratory:  negative for cough, dyspnea on exertion, shortness of breath, wheezing  Cardiovascular:  negative for chest pain, chest pressure/discomfort, lower extremity edema, +palpitations  Gastrointestinal:  negative for abdominal pain, constipation, diarrhea, nausea, vomiting  Neurological:  +dizziness, -headache    Medications: Allergies:     Allergies   Allergen Reactions    Atorvastatin Hives       Current Meds:   Scheduled Meds:    tamsulosin  0.4 mg Oral Daily    insulin glargine  15 Units SubCUTAneous BID    heparin (porcine)  5,000 Units SubCUTAneous 3 times per day    dextrose  25 g IntraVENous Once    insulin lispro  0-4 Units SubCUTAneous TID     insulin lispro  0-4 Units SubCUTAneous Nightly    OLANZapine  5 mg Oral Nightly    neomycin-bacitracin-polymyxin   Topical BID    aspirin  81 mg Oral Daily    amiodarone  200 mg Oral Daily    pantoprazole (PROTONIX) 40 mg injection  40 mg IntraVENous Q12H    sodium chloride flush  5-40 mL IntraVENous 2 times per day     Continuous Infusions:    sodium chloride 50 mL/hr at 12/07/22 3422    dextrose      sodium chloride      sodium chloride Stopped (22 0512)    dextrose       PRN Meds: glucose, dextrose bolus **OR** dextrose bolus, glucagon (rDNA), dextrose, fentanNYL, LORazepam, sodium chloride, sodium chloride flush, sodium chloride, ondansetron **OR** ondansetron, polyethylene glycol, acetaminophen **OR** acetaminophen, sodium phosphate IVPB **OR** sodium phosphate IVPB **OR** sodium phosphate IVPB, magnesium sulfate, potassium chloride **OR** potassium chloride, glucose, dextrose bolus **OR** dextrose bolus, glucagon (rDNA), dextrose    Data:     Past Medical History:   has a past medical history of [facility] CAD (coronary artery disease), Depression, DM2 (diabetes mellitus, type 2) ([facility] and HTN (hypertension). Social History:   reports that he has quit smoking. His smoking use included cigarettes. He has never used smokeless tobacco. He reports that he does not currently use alcohol. He reports that he does not use drugs. Family History:   Family History   Problem Relation Age of Onset    Cancer Mother     Cancer Father     Heart Disease Brother        Vitals:  BP (!) 165/73   Pulse (!) 104   Temp 98.9 °F (37.2 °C) (Temporal)   Resp 18   Ht 6' 2\" (1.88 m)   Wt 199 lb 11.8 oz (90.6 kg)   SpO2 95%   BMI 25.64 kg/m²   Temp (24hrs), Av.5 °F (36.9 °C), Min:98 °F (36.7 °C), Max:99.2 °F (37.3 °C)    Recent Labs     22  1114 22  1531 228 22  0822   POCGLU 239* 281* 308* 269*       I/O (24Hr):     Intake/Output Summary (Last 24 hours) at 2022 0944  Last data filed at 2022 0842  Gross per 24 hour   Intake 2694 ml   Output 1450 ml   Net 1244 ml       Labs:  Hematology:  Recent Labs     22  0229 22  0744 22  2000 22  0441   WBC 5.2  --   --  6.0   RBC 2.63*  --   --  2.77*   HGB 7.5* 7.9* 8.9* 7.9*   HCT 24.3* 24.9* 29.3* 25.6*   MCV 92.4  --   --  92.4   MCH 28.5  --   --  28.5   MCHC 30.9  --   --  30.9   RDW 16.6*  --   --  16.2*     --   --  320   MPV 9.7  -- --  10.1     Chemistry:  Recent Labs     12/05/22  1026 12/06/22  0744 12/07/22  0411 12/08/22  0441   NA  --  146* 137 140   K  --  3.1* 4.3 3.5*   CL  --  111* 102 103   CO2  --  24 23 25   GLUCOSE  --  81 218* 298*   BUN  --  16 15 12   CREATININE  --  0.94 0.97 1.13   MG  --  1.8  --  1.9   ANIONGAP  --  11 12 12   LABGLOM  --  >60 >60 >60   CALCIUM  --  7.8* 7.9* 8.0*   CAION 1.14  --   --   --      Recent Labs     12/06/22  2040 12/07/22  0803 12/07/22  1114 12/07/22  1531 12/07/22 2028 12/08/22  0822   POCGLU 187* 221* 239* 281* 308* 269*     ABG:  Lab Results   Component Value Date/Time    POCPH 7.469 12/06/2022 10:06 AM    POCPCO2 34.8 12/06/2022 10:06 AM    POCPO2 82.2 12/06/2022 10:06 AM    POCHCO3 25.3 12/06/2022 10:06 AM    NBEA 5 12/04/2022 04:34 AM    PBEA 2 12/06/2022 10:06 AM    KMYJ4ATG 97 12/06/2022 10:06 AM    FIO2 30.0 12/05/2022 04:31 AM     Lab Results   Component Value Date/Time    SPECIAL RT ACUB 9ML 12/03/2022 03:35 PM     Lab Results   Component Value Date/Time    CULTURE NO GROWTH 4 DAYS 12/03/2022 03:35 PM       Radiology:  CT HEAD WO CONTRAST    Result Date: 12/2/2022  No acute intracranial abnormality. XR CHEST PORTABLE    Result Date: 12/4/2022  Increased left basilar atelectasis or pneumonia. Mild central vascular congestion. XR CHEST PORTABLE    Result Date: 12/2/2022  1. Endotracheal tube in expected position. 2. Bibasilar pulmonary opacities, atelectasis versus pneumonia. XR ABDOMEN FOR NG/OG/NE TUBE PLACEMENT    Result Date: 12/2/2022  Enteric tube in expected position.        Physical Examination:        General appearance:  alert, cooperative and no distress, chronically ill appearing  Mental Status:  oriented to person, place and not  time and abnormal affect, confused  Lungs:  clear to auscultation bilaterally, normal effort  Heart: tachycardic rate and rhythm, no murmur  Abdomen:  soft, nontender, nondistended, normal bowel sounds, no masses, hepatomegaly, splenomegaly  Extremities:  no edema, redness, tenderness in the calves  Skin:  no gross lesions, rashes, induration    Assessment:        Hospital Problems             Last Modified POA    * (Principal) Hemorrhagic shock ([address] 12/8/2022 Yes    Upper GI bleed 12/8/2022 Yes    Atrial fibrillation with rapid ventricular response ([address] 12/8/2022 Yes    Essential hypertension 12/7/2022 Yes    S/P CABG (coronary artery bypass graft) 12/7/2022 Yes    Anemia due to blood loss, acute 12/7/2022 Yes    Type 2 diabetes mellitus with hyperglycemia, with long-term current use of insulin ([address] 12/8/2022 Yes    Orthostatic dizziness 12/8/2022 Yes    Lethargy 12/8/2022 Yes    Demand ischemia ([address] 12/8/2022 Yes       Plan:        Shock due to Acute upper GIB-improved: Off of pressors. Received 3 units PRBC, FFPx2, 1 unit cryoprecipitate and TXA along with Vit K. EGD 11/28: Duodenal ulcer with duodenitis. PPI BID, trend HH Q12. Will give additional unit given anemia, orthostatics. Lethargy: Stop Zyprexa, ativan, Fentanyl. NCNC anemia: Due to blood loss. Evaluate for ROXANE. Transfuse as needed for symptomatic anemia or Hgb <8  Atrial fibrillation RVR: On amiodarone. Coumadin on hold for now, Ulcer high risk for re-bleeding. Discussed with wife risk benefit, at this time holding off. Orthostatic hypotension: Recheck today, will give 1 unit PRBC. CAD s/p CABG 10/25/22: Continue ASA, zetia due to allergy with statin-throat swelling. . Start ACE/BB once more stable. Elevated troponin due to demand ischemia from shock: no chest pain currently, troponin flat. ERICA due to hypotension: Resolved  Diabetes mellitus type II with hyperglycemia: Increase back to home dose of lantus, continue ISS for now. Hold metformin goal 140-180  Required intubation in ICU extubated 12/5 to to respiratory failure  Labs, imaging, records reviewed.        Jasvir Colvin DO  12/8/2022  9:44 AM

## 2022-12-08 NOTE — SIGNIFICANT EVENT
[facility]  Office: [address], DO, Pauline Males, DO, Martin Loop, DO, Brayan Mancia Blood, DO, Jose Enrique Alexandre MD, Hermelindo Schmidt MD, Humberto Ferguson MD, Yaima Jackson MD,  Jihan Morales MD, Radha Hernandez MD, Yue Castelan, DO, Slim Amaya MD,  Lamar Wayne MD, Luis Salgado MD, Claudean Budds, DO, Vinnie Boucher MD, Christian Gutierrez MD, Solomon hCapman, DO, Tori Pandya MD, Davy Dale MD, Shawna Tariq MD, Serina Hodges MD, Anisha Botello, DO, Costa Bassett MD, Sharon Sierra MD, Nimo Ashley, Ping Ayers, CNP, Tawanna Hensley, CNP, Shaina Owens, CNP,  Omkar Ko, [facility], Joycelyn Nichols, CNP, Fernand Cockayne, CNP, Sabi Loera, CNP, Joaquin Shields, CNP, Ken Roe, CNP, nIna Lockhart PATiffanyC, Silvana Hernandez, CNS, Stuart Phelps, CNP, Hu Raygoza, [address] [place] [address]    Second Visit Note  For more detailed information please refer to the progress note of the day      12/8/2022    1:55 PM    Name:   Jorge Jasso  MRN:     [id number]     [place]:      [de-identified]   Room:   [address] [place] [address] Day:  9  Admit Date:  11/29/2022 12:20 PM    PCP:   Debby Clark  Code Status:  Full Code      Pt vitals were reviewed   New labs were reviewed   Patient was seen    Asked to come to bedside by RN . Patient developed chills, hypoxia and palpitations one hour into RBC transfusion. Telemetry showed Afib with RVR. Patient was awake alert but did appear uncomfortable     Updated plan :     Afib RVR: Will give 2 doses IV lopressor and bolus amiodarone. Discussed with Cardiology who will see patient. Possible transfusion reaction: Received Pepcid/benadryl BIPAP and IV lasix. CBC/B MP Hemolytic workup ordered. Check CXR. Blood will be sent to blood bank for workup. Continue monitoring blood pressure.        Pete Rodriguez DO  12/8/2022  1:55 PM

## 2022-12-08 NOTE — PROGRESS NOTES
Physical Therapy  Facility/Department: [address] NEURO  Daily Treatment Note  NAME: Krishna Patton  : 1945  MRN: [id number]    Date of Service: 2022    Discharge Recommendations:  Patient would benefit from continued therapy after discharge   PT Equipment Recommendations  Equipment Needed: No    Patient Diagnosis(es): There were no encounter diagnoses. Assessment   Assessment: Pt presents with decline in functional abiilty and moblity, Pt demonstrates gait and balance deficits  Activity Tolerance: Patient limited by endurance  Equipment Needed: No     Plan    Physcial Therapy Plan  General Plan: 6-7 times per week  Current Treatment Recommendations: Strengthening;ROM;Balance training;Gait training;Functional mobility training;Transfer training;Stair training;Home exercise program;Safety education & training;Patient/Caregiver education & training; Therapeutic activities; Equipment evaluation, education, & procurement  Additional Comments: Pt with decreased tolerance downward tranding Hgb with transfusion pending     Restrictions  Restrictions/Precautions  Restrictions/Precautions: Up as Tolerated  Required Braces or Orthoses?: No     Subjective    Subjective  Subjective: awake and alert in agreement with PT intervention this AM, family in room at bed side  Pain: No complaint of Pain or discomfort  Orientation  Overall Orientation Status: Impaired  Orientation Level: Oriented to situation;Oriented to person;Oriented to time;Oriented to place  Cognition  Overall Cognitive Status: Exceptions  Arousal/Alertness: Appropriate responses to stimuli  Following Commands: Follows multistep commands with repitition; Follows multistep commands with increased time  Attention Span: Attends with cues to redirect  Memory: Decreased recall of biographical Information; Appears intact  Safety Judgement: Good awareness of safety precautions  Problem Solving: Decreased awareness of errors  Insights: Decreased awareness of Yes  Functional Movement Patterns: pt demonstrates poor sitting balance participated in sitting balance activity with focus on sitting supported, mid line posture and center of gravity, fair return demonstration, Focus of tx sitting and standing stabilty, posture, hand and foot placement with walker needed to progress mobilty. PT Exercises  Exercise Treatment: Pt participated in seated and supine LE ROM exeercises to increase general strength, pt completed 10 reps each x 10  ankle pumps, heel slide, hip abduction. focus on technique and tolerance, bed mobility, and positioning     Safety Devices  Type of Devices: Gait belt;Call light within reach;Nurse notified; Patient at risk for falls; Left in chair;Sitter present  Restraints  Restraints Initially in Place: No       Goals  Short Term Goals  Time Frame for Short Term Goals: 14  Short Term Goal 1: Pt to perform bed mobility with supervision  Short Term Goal 2: Pt to demonstrate functional transfers CGA  Short Term Goal 3: Pt to ambulate 150ft w/ RW Otilia  Short Term Goal 4: Tolerate 30 minutes of therapy to demo increased endurance  Patient Goals   Patient Goals : To get stronger    AM-PAC Inpatient Mobility Raw Score   12      Education  Patient Education  Education Given To: Patient  Education Provided: Role of Therapy;Plan of Care;Equipment; Fall Prevention Strategies  Education Method: Demonstration  Barriers to Learning: Cognition  Education Outcome: Verbalized understanding;Demonstrated understanding;Continued education needed    Therapy Time   Individual Concurrent Group Co-treatment   Time In 0920         Time Out 4066         Minutes 58         Timed Code Treatment Minutes: [address], PT, DPT

## 2022-12-08 NOTE — PLAN OF CARE
Problem: Respiratory - Adult  Goal: Achieves optimal ventilation and oxygenation  12/7/2022 2144 by Purvi Hernandez RCP  Outcome: Progressing  Flowsheets (Taken 12/7/2022 2144)  Achieves optimal ventilation and oxygenation:   Assess for changes in respiratory status   Respiratory therapy support as indicated   Assess for changes in mentation and behavior   Encourage broncho-pulmonary hygiene including cough, deep breathe, incentive spirometry   Oxygen supplementation based on oxygen saturation or arterial blood gases   Assess and instruct to report shortness of breath or any respiratory difficulty

## 2022-12-09 ENCOUNTER — APPOINTMENT (OUTPATIENT)
Dept: GENERAL RADIOLOGY | Age: 77
DRG: 377 | End: 2022-12-09
Attending: INTERNAL MEDICINE
Payer: MEDICARE

## 2022-12-09 ENCOUNTER — APPOINTMENT (OUTPATIENT)
Dept: CT IMAGING | Age: 77
DRG: 377 | End: 2022-12-09
Attending: INTERNAL MEDICINE
Payer: MEDICARE

## 2022-12-09 LAB
ABO/RH: NORMAL
ABO/RH: NORMAL
ANION GAP SERPL CALCULATED.3IONS-SCNC: 17 MMOL/L (ref 9–17)
ANTIBODY SCREEN: NEGATIVE
ARM BAND NUMBER: NORMAL
BLD PROD TYP BPU: NORMAL
BLOOD BANK BLOOD PRODUCT EXPIRATION DATE: NORMAL
BLOOD BANK ISBT PRODUCT BLOOD TYPE: 8400
BLOOD BANK PRODUCT CODE: NORMAL
BLOOD BANK UNIT TYPE AND RH: NORMAL
BPU ID: NORMAL
BUN BLDV-MCNC: 14 MG/DL (ref 8–23)
CALCIUM SERPL-MCNC: 8.2 MG/DL (ref 8.6–10.4)
CHLORIDE BLD-SCNC: 107 MMOL/L (ref 98–107)
CO2: 22 MMOL/L (ref 20–31)
CREAT SERPL-MCNC: 1.39 MG/DL (ref 0.7–1.2)
CROSSMATCH RESULT: NORMAL
DAT, POLYSPECIFIC: NEGATIVE
DAT, POLYSPECIFIC: NEGATIVE
DISPENSE STATUS BLOOD BANK: NORMAL
EXPIRATION DATE: NORMAL
GFR SERPL CREATININE-BSD FRML MDRD: 52 ML/MIN/1.73M2
GLUCOSE BLD-MCNC: 281 MG/DL (ref 75–110)
GLUCOSE BLD-MCNC: 290 MG/DL (ref 75–110)
GLUCOSE BLD-MCNC: 333 MG/DL (ref 70–99)
GLUCOSE BLD-MCNC: 362 MG/DL (ref 75–110)
GLUCOSE BLD-MCNC: 378 MG/DL (ref 75–110)
HCT VFR BLD CALC: 25.6 % (ref 40.7–50.3)
HCT VFR BLD CALC: 29.1 % (ref 40.7–50.3)
HCT VFR BLD CALC: 29.5 % (ref 40.7–50.3)
HEMOGLOBIN: 7.9 G/DL (ref 13–17)
HEMOGLOBIN: 9.1 G/DL (ref 13–17)
HEMOGLOBIN: 9.2 G/DL (ref 13–17)
MCH RBC QN AUTO: 28.7 PG (ref 25.2–33.5)
MCHC RBC AUTO-ENTMCNC: 30.9 G/DL (ref 28.4–34.8)
MCV RBC AUTO: 93.1 FL (ref 82.6–102.9)
NRBC AUTOMATED: 0 PER 100 WBC
PATHOLOGIST: NORMAL
PDW BLD-RTO: 16.4 % (ref 11.8–14.4)
PLATELET # BLD: 297 K/UL (ref 138–453)
PMV BLD AUTO: 10.2 FL (ref 8.1–13.5)
POTASSIUM SERPL-SCNC: 3.6 MMOL/L (ref 3.7–5.3)
RBC # BLD: 2.75 M/UL (ref 4.21–5.77)
SEROLOGICAL REPORT: NORMAL
SODIUM BLD-SCNC: 146 MMOL/L (ref 135–144)
TRANSFUSION REACTION REPORT: NORMAL
TRANSFUSION STATUS: NORMAL
UNIT DIVISION: 0
UNIT ISSUE DATE/TIME: NORMAL
WBC # BLD: 7.4 K/UL (ref 3.5–11.3)

## 2022-12-09 PROCEDURE — 6370000000 HC RX 637 (ALT 250 FOR IP): Performed by: INTERNAL MEDICINE

## 2022-12-09 PROCEDURE — 85014 HEMATOCRIT: CPT

## 2022-12-09 PROCEDURE — 85027 COMPLETE CBC AUTOMATED: CPT

## 2022-12-09 PROCEDURE — 6370000000 HC RX 637 (ALT 250 FOR IP): Performed by: FAMILY MEDICINE

## 2022-12-09 PROCEDURE — 2500000003 HC RX 250 WO HCPCS: Performed by: INTERNAL MEDICINE

## 2022-12-09 PROCEDURE — 2060000000 HC ICU INTERMEDIATE R&B

## 2022-12-09 PROCEDURE — 2580000003 HC RX 258: Performed by: INTERNAL MEDICINE

## 2022-12-09 PROCEDURE — 6370000000 HC RX 637 (ALT 250 FOR IP): Performed by: STUDENT IN AN ORGANIZED HEALTH CARE EDUCATION/TRAINING PROGRAM

## 2022-12-09 PROCEDURE — 6360000002 HC RX W HCPCS: Performed by: STUDENT IN AN ORGANIZED HEALTH CARE EDUCATION/TRAINING PROGRAM

## 2022-12-09 PROCEDURE — 6360000002 HC RX W HCPCS

## 2022-12-09 PROCEDURE — 2580000003 HC RX 258: Performed by: STUDENT IN AN ORGANIZED HEALTH CARE EDUCATION/TRAINING PROGRAM

## 2022-12-09 PROCEDURE — 70450 CT HEAD/BRAIN W/O DYE: CPT

## 2022-12-09 PROCEDURE — 6360000002 HC RX W HCPCS: Performed by: NURSE PRACTITIONER

## 2022-12-09 PROCEDURE — 82947 ASSAY GLUCOSE BLOOD QUANT: CPT

## 2022-12-09 PROCEDURE — 71045 X-RAY EXAM CHEST 1 VIEW: CPT

## 2022-12-09 PROCEDURE — 80048 BASIC METABOLIC PNL TOTAL CA: CPT

## 2022-12-09 PROCEDURE — 36415 COLL VENOUS BLD VENIPUNCTURE: CPT

## 2022-12-09 PROCEDURE — 94660 CPAP INITIATION&MGMT: CPT

## 2022-12-09 PROCEDURE — C9113 INJ PANTOPRAZOLE SODIUM, VIA: HCPCS | Performed by: STUDENT IN AN ORGANIZED HEALTH CARE EDUCATION/TRAINING PROGRAM

## 2022-12-09 PROCEDURE — 85018 HEMOGLOBIN: CPT

## 2022-12-09 PROCEDURE — 99233 SBSQ HOSP IP/OBS HIGH 50: CPT | Performed by: FAMILY MEDICINE

## 2022-12-09 RX ORDER — LANOLIN ALCOHOL/MO/W.PET/CERES
325 CREAM (GRAM) TOPICAL 2 TIMES DAILY WITH MEALS
Status: DISCONTINUED | OUTPATIENT
Start: 2022-12-09 | End: 2022-12-14 | Stop reason: HOSPADM

## 2022-12-09 RX ORDER — ESCITALOPRAM OXALATE 10 MG/1
10 TABLET ORAL DAILY
Status: DISCONTINUED | OUTPATIENT
Start: 2022-12-09 | End: 2022-12-14 | Stop reason: HOSPADM

## 2022-12-09 RX ORDER — LORAZEPAM 2 MG/ML
1 INJECTION INTRAMUSCULAR ONCE
Status: COMPLETED | OUTPATIENT
Start: 2022-12-09 | End: 2022-12-09

## 2022-12-09 RX ORDER — AMIODARONE HYDROCHLORIDE 200 MG/1
200 TABLET ORAL DAILY
Status: DISCONTINUED | OUTPATIENT
Start: 2022-12-10 | End: 2022-12-14 | Stop reason: HOSPADM

## 2022-12-09 RX ORDER — INSULIN GLARGINE 100 [IU]/ML
20 INJECTION, SOLUTION SUBCUTANEOUS 2 TIMES DAILY
Status: DISCONTINUED | OUTPATIENT
Start: 2022-12-09 | End: 2022-12-13

## 2022-12-09 RX ORDER — PROCHLORPERAZINE EDISYLATE 5 MG/ML
10 INJECTION INTRAMUSCULAR; INTRAVENOUS ONCE
Status: COMPLETED | OUTPATIENT
Start: 2022-12-09 | End: 2022-12-09

## 2022-12-09 RX ADMIN — POLYMYXIN B SULFATE, BACITRACIN ZINC, NEOMYCIN SULFATE: 5000; 3.5; 4 OINTMENT TOPICAL at 09:05

## 2022-12-09 RX ADMIN — HEPARIN SODIUM 5000 UNITS: 5000 INJECTION INTRAVENOUS; SUBCUTANEOUS at 15:15

## 2022-12-09 RX ADMIN — SODIUM CHLORIDE, PRESERVATIVE FREE 20 MG: 5 INJECTION INTRAVENOUS at 09:04

## 2022-12-09 RX ADMIN — INSULIN GLARGINE 20 UNITS: 100 INJECTION, SOLUTION SUBCUTANEOUS at 20:35

## 2022-12-09 RX ADMIN — ONDANSETRON 4 MG: 2 INJECTION INTRAMUSCULAR; INTRAVENOUS at 20:24

## 2022-12-09 RX ADMIN — SODIUM CHLORIDE, PRESERVATIVE FREE 20 MG: 5 INJECTION INTRAVENOUS at 20:29

## 2022-12-09 RX ADMIN — INSULIN LISPRO 4 UNITS: 100 INJECTION, SOLUTION INTRAVENOUS; SUBCUTANEOUS at 20:34

## 2022-12-09 RX ADMIN — SODIUM CHLORIDE, PRESERVATIVE FREE 40 MG: 5 INJECTION INTRAVENOUS at 15:15

## 2022-12-09 RX ADMIN — POLYMYXIN B SULFATE, BACITRACIN ZINC, NEOMYCIN SULFATE: 5000; 3.5; 4 OINTMENT TOPICAL at 22:05

## 2022-12-09 RX ADMIN — ESCITALOPRAM OXALATE 10 MG: 10 TABLET ORAL at 16:58

## 2022-12-09 RX ADMIN — SODIUM CHLORIDE, PRESERVATIVE FREE 40 MG: 5 INJECTION INTRAVENOUS at 03:12

## 2022-12-09 RX ADMIN — PROCHLORPERAZINE EDISYLATE 10 MG: 5 INJECTION INTRAMUSCULAR; INTRAVENOUS at 22:12

## 2022-12-09 RX ADMIN — LORAZEPAM 1 MG: 2 INJECTION INTRAMUSCULAR; INTRAVENOUS at 03:09

## 2022-12-09 RX ADMIN — INSULIN LISPRO 2 UNITS: 100 INJECTION, SOLUTION INTRAVENOUS; SUBCUTANEOUS at 09:15

## 2022-12-09 RX ADMIN — FERROUS SULFATE TAB EC 325 MG (65 MG FE EQUIVALENT) 325 MG: 325 (65 FE) TABLET DELAYED RESPONSE at 16:58

## 2022-12-09 RX ADMIN — INSULIN LISPRO 4 UNITS: 100 INJECTION, SOLUTION INTRAVENOUS; SUBCUTANEOUS at 16:59

## 2022-12-09 RX ADMIN — HEPARIN SODIUM 5000 UNITS: 5000 INJECTION INTRAVENOUS; SUBCUTANEOUS at 20:29

## 2022-12-09 RX ADMIN — SODIUM CHLORIDE, PRESERVATIVE FREE 10 ML: 5 INJECTION INTRAVENOUS at 09:05

## 2022-12-09 ASSESSMENT — PULMONARY FUNCTION TESTS: PIF_VALUE: 24

## 2022-12-09 ASSESSMENT — PAIN SCALES - GENERAL: PAINLEVEL_OUTOF10: 0

## 2022-12-09 NOTE — PROGRESS NOTES
[facility]  Progress Note                   Date:   12/9/2022  Patient name: Krishna Patton  Date of admission:  11/29/2022 12:20 PM  MRN:   [id number]  YOB: 1945  PCP: Tawana Navarro    Reason for Admission: Upper GI bleed [K92.2]    Subjective:       Clinical Changes /Abnormalities: Had episode of Afib RVR yesterday during PRBC transfusion. IV Lopressor given and started on IV Amio gtt. Remains Afib with rate control . Labs, vitals, & tele reviewed. Mild ERICA with creat up to 1. 39. K+ 3.6  HGB continues to fluctuate.  Was up to 9.5 after transfusion and now down to 7.9 again    Review of Systems    Medications:   Scheduled Meds:   insulin glargine  40 Units SubCUTAneous Nightly    ezetimibe  10 mg Oral Nightly    famotidine (PEPCID) injection  20 mg IntraVENous BID    amiodarone  150 mg IntraVENous Once    tamsulosin  0.4 mg Oral Daily    heparin (porcine)  5,000 Units SubCUTAneous 3 times per day    dextrose  25 g IntraVENous Once    insulin lispro  0-4 Units SubCUTAneous TID WC    insulin lispro  0-4 Units SubCUTAneous Nightly    neomycin-bacitracin-polymyxin   Topical BID    aspirin  81 mg Oral Daily    [Held by provider] amiodarone  200 mg Oral Daily    pantoprazole (PROTONIX) 40 mg injection  40 mg IntraVENous Q12H    sodium chloride flush  5-40 mL IntraVENous 2 times per day     Continuous Infusions:   sodium chloride      amiodarone 0.5 mg/min (12/08/22 2304)    dextrose      sodium chloride Stopped (12/06/22 0512)     CBC:   Recent Labs     12/08/22  0441 12/08/22  1348 12/09/22  0350   WBC 6.0 6.2 7.4   HGB 7.9* 9.5* 7.9*    326 297     BMP:    Recent Labs     12/08/22  0441 12/08/22  1348 12/08/22  2307 12/09/22  0350    140  --  146*   K 3.5* 3.5* 3.5* 3.6*    103  --  107   CO2 25 23  --  22   BUN 12 13  --  14   CREATININE 1.13 1.14  --  1.39*   GLUCOSE 298* 233*  --  333*     Hepatic:No results for input(s): AST, ALT, ALB, BILITOT, ALKPHOS in the last 72 hours. Troponin: No results for input(s): TROPHS in the last 72 hours. BNP: No results for input(s): BNP in the last 72 hours. Lipids: No results for input(s): CHOL, HDL in the last 72 hours. Invalid input(s): LDLCALCU  INR:   Recent Labs     12/08/22  1348   INR 1.1       Objective:   Vitals: BP (!) 151/73   Pulse 91   Temp 99.4 °F (37.4 °C) (Axillary)   Resp 22   Ht 6' 2\" (1.88 m)   Wt 199 lb 11.8 oz (90.6 kg)   SpO2 95%   BMI 25.64 kg/m²   General appearance: alert and cooperative with exam  HEENT: Head: Normocephalic, no lesions, without obvious abnormality. Neck:no JVD, trachea midline, no adenopathy  Lungs: Clear to auscultation  Heart: Irregular rate and rhythm, s1/s2 auscultated, no murmurs, Afib   Abdomen: soft, non-tender, bowel sounds active  Extremities: no edema  Neurologic: not done    04/2018  The left ventricle is normal size. There is normal left ventricular wall thickness. Left ventricle systolic function is normal.      The Ejection Fraction is 55-60%. Grade I diastolic dysfunction. Cannot rule out anteroapical hypokinesis seen only in apical 4 chamber views although endocardium   was not sen well. Echo 04/2022    Left Ventricle: Left ventricle appears normal in size. Wall thickness   is normal. Systolic function is low normal to mildly decreased with an   ejection fraction of 50-55%. Left Atrium: Left atrium is normal in size. The left atrial volume   index is 27.1 mL/m2. Right Ventricle: Right ventricular size appears normal. The right   ventricular basal diameter is 35.0 mm. Systolic function is normal.     Mitral Valve: The leaflets are mildly thickened and exhibit normal   excursion. Aortic Valve: The aortic valve is congenitally bicuspid. The leaflets   exhibit normal excursion. There is moderate sclerosis. There is no   regurgitation. There is mild stenosis. The calculated aortic valve area is   1.69 cm2.  The calculated aortic valve peak gradient is 11.83 mmHg. The   calculated aortic valve mean gradient is 6.00 mmHg. Assessment / Acute Cardiac Problems:   Shock likely hypovolemic secondary to bleeding duodenal ulcer, currently on pressor support. Status post EGD 11/29/2022 with bipolar cautery of bleeding duodenal ulcer. CAD status post PCI X 3 in 2012 and CABG X2 with LIMA to distal LAD, SVG to OM1 with exploration of distal PDA 10/25/2022. A. fib which was newly diagnosed in November 2022, on Coumadin and amiodarone. Preserved LVEF on echocardiogram as above. Elevated troponin 910 with mild uptrend. Likely secondary to bleed. Acute hypoxic respiratory failure secondary to above. Bicuspid Aortic valve. Hypertension. Hyperlipidemia. Chronic LBBB. Patient Active Problem List:     Upper GI bleed     Atrial fibrillation with rapid ventricular response (HCC)     Essential hypertension     S/P CABG (coronary artery bypass graft)     Anemia due to blood loss, acute     Type 2 diabetes mellitus with hyperglycemia, with long-term current use of insulin ([facility])     Hemorrhagic shock ([facility])     Orthostatic dizziness     Lethargy     Demand ischemia ([facility] [address]      Plan of Treatment:   Back in Afib now with rate control on IV Amio. Will switch back to oral tomorrow and add PO Lopressor. Continue to hold off on AC at this time. Consider restarting if/when HGB remains stable as OP however GI notes indicate ulcer is high risk for re-bleed  Mild ERICA per primary.  Poor oral intake may be contributing  Echo reviewed as above    Electronically signed by JERALD Goodman CNP on 12/9/2022 at 12:52 PM  [address]  [phone]

## 2022-12-09 NOTE — PLAN OF CARE
Problem: Discharge Planning  Goal: Discharge to home or other facility with appropriate resources  Outcome: Progressing     Problem: Confusion  Goal: Confusion, delirium, dementia, or psychosis is improved or at baseline  Description: INTERVENTIONS:  1. Assess for possible contributors to thought disturbance, including medications, impaired vision or hearing, underlying metabolic abnormalities, dehydration, psychiatric diagnoses, and notify attending LIP  2. [place] high risk fall precautions, as indicated  3. Provide frequent short contacts to provide reality reorientation, refocusing and direction  4. Decrease environmental stimuli, including noise as appropriate  5. Monitor and intervene to maintain adequate nutrition, hydration, elimination, sleep and activity  6. If unable to ensure safety without constant attention obtain sitter and review sitter guidelines with assigned personnel  7.  Initiate Psychosocial CNS and Spiritual Care consult, as indicated  Outcome: Progressing     Problem: Pain  Goal: Verbalizes/displays adequate comfort level or baseline comfort level  Outcome: Progressing     Problem: Cardiovascular - Adult  Goal: Maintains optimal cardiac output and hemodynamic stability  Outcome: Progressing  Goal: Absence of cardiac dysrhythmias or at baseline  Outcome: Progressing     Problem: Gastrointestinal - Adult  Goal: Minimal or absence of nausea and vomiting  12/9/2022 1738 by Luz Alejandro  Outcome: Progressing  12/9/2022 0504 by Joyce Sandoval RN  Outcome: Progressing  Goal: Maintains or returns to baseline bowel function  Outcome: Progressing  Goal: Maintains adequate nutritional intake  Outcome: Progressing  Goal: Establish and maintain optimal ostomy function  Outcome: Progressing     Problem: Respiratory - Adult  Goal: Achieves optimal ventilation and oxygenation  Outcome: Progressing     Problem: Nutrition Deficit:  Goal: Optimize nutritional status  12/9/2022 1738 by Luz Alejandro  Outcome: Progressing  12/9/2022 1504 by Josie Ramirez RD, LD  Flowsheets (Taken 12/9/2022 1450)  Nutrient intake appropriate for improving, restoring, or maintaining nutritional needs:   Assess nutritional status and recommend course of action   Monitor oral intake, labs, and treatment plans   Recommend appropriate diets, oral nutritional supplements, and vitamin/mineral supplements     Problem: Skin/Tissue Integrity  Goal: Absence of new skin breakdown  Description: 1. Monitor for areas of redness and/or skin breakdown  2. Assess vascular access sites hourly  3. Every 4-6 hours minimum:  Change oxygen saturation probe site  4. Every 4-6 hours:  If on nasal continuous positive airway pressure, respiratory therapy assess nares and determine need for appliance change or resting period.   12/9/2022 1738 by Sybil Mulligan  Outcome: Progressing  12/9/2022 0504 by Felipe Randhawa RN  Outcome: Progressing     Problem: Safety - Adult  Goal: Free from fall injury  12/9/2022 1738 by Sybil Mulligan  Outcome: Progressing  12/9/2022 0504 by Felipe Randhawa RN  Outcome: Progressing     Problem: ABCDS Injury Assessment  Goal: Absence of physical injury  12/9/2022 1738 by Sybil Mulligan  Outcome: Progressing  12/9/2022 0504 by Felipe Randhawa RN  Outcome: Progressing     Problem: Neurosensory - Adult  Goal: Achieves stable or improved neurological status  Outcome: Progressing  Goal: Absence of seizures  12/9/2022 1738 by Sybil Mulligan  Outcome: Progressing  12/9/2022 0504 by Felipe Randhawa RN  Outcome: Progressing  Goal: Remains free of injury related to seizures activity  Outcome: Progressing  Goal: Achieves maximal functionality and self care  Outcome: Progressing     Problem: Skin/Tissue Integrity - Adult  Goal: Skin integrity remains intact  Outcome: Progressing  Goal: Incisions, wounds, or drain sites healing without S/S of infection  12/9/2022 1738 by Sybil Mulligan  Outcome: Progressing  12/9/2022 0504 by Felipe Randhawa RN  Outcome: Progressing  Goal: Oral mucous membranes remain intact  Outcome: Progressing     Problem: Safety - Medical Restraint  Goal: Remains free of injury from restraints (Restraint for Interference with Medical Device)  Description: INTERVENTIONS:  1. Determine that other, less restrictive measures have been tried or would not be effective before applying the restraint  2. Evaluate the patient's condition at the time of restraint application  3. Inform patient/family regarding the reason for restraint  4.  Q2H: Monitor safety, psychosocial status, comfort, nutrition and hydration  12/9/2022 1738 by Kelly Ziegler  Outcome: Progressing  Flowsheets (Taken 12/9/2022 0600 by Luca Valencia RN)  Remains free of injury from restraints (restraint for interference with medical device): Every 2 hours: Monitor safety, psychosocial status, comfort, nutrition and hydration  12/9/2022 0504 by Luca Valencia RN  Outcome: Progressing  Flowsheets  Taken 12/9/2022 0400  Remains free of injury from restraints (restraint for interference with medical device): Every 2 hours: Monitor safety, psychosocial status, comfort, nutrition and hydration  Taken 12/9/2022 0200  Remains free of injury from restraints (restraint for interference with medical device): Every 2 hours: Monitor safety, psychosocial status, comfort, nutrition and hydration  Taken 12/9/2022 0000  Remains free of injury from restraints (restraint for interference with medical device): Every 2 hours: Monitor safety, psychosocial status, comfort, nutrition and hydration     Problem: Chronic Conditions and Co-morbidities  Goal: Patient's chronic conditions and co-morbidity symptoms are monitored and maintained or improved  Outcome: Progressing

## 2022-12-09 NOTE — PLAN OF CARE
Problem: Gastrointestinal - Adult  Goal: Minimal or absence of nausea and vomiting  Outcome: Progressing     Problem: Skin/Tissue Integrity  Goal: Absence of new skin breakdown  Description: 1. Monitor for areas of redness and/or skin breakdown  2. Assess vascular access sites hourly  3. Every 4-6 hours minimum:  Change oxygen saturation probe site  4. Every 4-6 hours:  If on nasal continuous positive airway pressure, respiratory therapy assess nares and determine need for appliance change or resting period. Outcome: Progressing     Problem: Safety - Adult  Goal: Free from fall injury  Outcome: Progressing     Problem: ABCDS Injury Assessment  Goal: Absence of physical injury  Outcome: Progressing     Problem: Neurosensory - Adult  Goal: Absence of seizures  Outcome: Progressing     Problem: Skin/Tissue Integrity - Adult  Goal: Incisions, wounds, or drain sites healing without S/S of infection  Outcome: Progressing     Problem: Safety - Medical Restraint  Goal: Remains free of injury from restraints (Restraint for Interference with Medical Device)  Description: INTERVENTIONS:  1. Determine that other, less restrictive measures have been tried or would not be effective before applying the restraint  2. Evaluate the patient's condition at the time of restraint application  3. Inform patient/family regarding the reason for restraint  4.  Q2H: Monitor safety, psychosocial status, comfort, nutrition and hydration  Outcome: Progressing  Flowsheets  Taken 12/9/2022 0400  Remains free of injury from restraints (restraint for interference with medical device): Every 2 hours: Monitor safety, psychosocial status, comfort, nutrition and hydration  Taken 12/9/2022 0200  Remains free of injury from restraints (restraint for interference with medical device): Every 2 hours: Monitor safety, psychosocial status, comfort, nutrition and hydration  Taken 12/9/2022 0000  Remains free of injury from restraints (restraint for interference with medical device): Every 2 hours: Monitor safety, psychosocial status, comfort, nutrition and hydration

## 2022-12-09 NOTE — PROGRESS NOTES
Comprehensive Nutrition Assessment    Type and Reason for Visit:  Reassess    Nutrition Recommendations/Plan:   Continue current Dysphagia Soft and Bite-Sized diet with Mildly Thick (Nectar) liquids. Encourage/monitor PO intakes as tolerated. Will provide frozen Magic Cups x 2 per day. Monitor labs, weights, and plan of care. Malnutrition Assessment:  Malnutrition Status:  Insufficient data (12/09/22 1502)    Context:  Acute Illness       Nutrition Assessment:    Pt passed a bedside swallow study on 12/6 and has been started on a Dysphagia Soft and Bite-Sized diet with Mildly (Nectar) thick liquids. Pt has a sitter in his room and has been having confusion/agitation. Pt has been eating variable amounts of meals. Pt with a decreased appetite. Pt lethargic and not eating much of meals. Will modify ONS to provide frozen supplements (which are appropriate thickened consistency). Labs reviewed: Na 146 mmol/L, K 3.6 mmol/L, Glucose 290-333 mg/dL. Meds reviewed. Nutrition Related Findings:    Meds/Labs reviewed. Decreased appetite. Last BM 12/7. Wound Type: None       Current Nutrition Intake & Therapies:    Average Meal Intake: 26-50%, 51-75%  Average Supplements Intake: 26-50%, 51-75%  ADULT DIET; Dysphagia - Soft and Bite Sized; 4 carb choices (60 gm/meal); Mildly Thick (Nectar)  ADULT ORAL NUTRITION SUPPLEMENT; Breakfast, Lunch, Dinner; Diabetic Oral Supplement    Anthropometric Measures:  Height: 6' 2\" (188 cm)  Ideal Body Weight (IBW): 190 lbs (86 kg)    Admission Body Weight: 207 lb 0.2 oz (93.9 kg)  Current Body Weight: 199 lb 11.8 oz (90.6 kg), 105.1 % IBW. Weight Source: Bed Scale  Current BMI (kg/m2): 25.6                          BMI Categories: Overweight (BMI 25.0-29. 9)    Estimated Daily Nutrient Needs:  Energy Requirements Based On: Formula  Weight Used for Energy Requirements: Current  Energy (kcal/day): 1016-3538 kcal/day  Weight Used for Protein Requirements: Ideal  Protein (g/day): 100-120 gm pro/day  Method Used for Fluid Requirements: Other (Comment)  Fluid (ml/day): per MD    Nutrition Diagnosis:   Inadequate oral intake related to cognitive or neurological impairment, swallowing difficulty (lethargy/confusion) as evidenced by swallow study results (need for modified diet and thickened liquids; variable PO intakes; need for ONS)    Nutrition Interventions:   Food and/or Nutrient Delivery: Continue Current Diet, Modify Oral Nutrition Supplement  Nutrition Education/Counseling: No recommendation at this time  Coordination of Nutrition Care: Continue to monitor while inpatient, Speech Therapy, Swallow Evaluation  Plan of Care discussed with: RN    Goals:  Previous Goal Met: Progressing toward Goal(s)  Goals: Meet at least 75% of estimated needs, within 7 days       Nutrition Monitoring and Evaluation:   Behavioral-Environmental Outcomes: None Identified  Food/Nutrient Intake Outcomes: Food and Nutrient Intake, Supplement Intake  Physical Signs/Symptoms Outcomes: Biochemical Data, Chewing or Swallowing, GI Status, Fluid Status or Edema, Nutrition Focused Physical Findings, Skin, Weight    Discharge Planning:     Too soon to determine     Izaiah Oakley, [address], LD  Contact: [phone]

## 2022-12-09 NOTE — PROGRESS NOTES
[facility]  Office: [address], DO, Liz Tyson, DO, Laz Marquez, DO, Whitney Byers Blood, DO, Edward Choi MD, Kavon Johnson MD, Ana Jain MD, Blanca Webb MD,  Kevin Tavera MD, Katarzyna Blanchard MD, Maximilian Powers, DO, Carolina Esposito MD,  Tabitha Rothman MD, Serg Morales MD, Angel Richards, DO, Lori Zuniga MD, Pat Centeno MD, Nathaniel Gaspar DO, Navi Andrews MD, Cedric Gamboa MD, Genevieve Pérez MD, Orly Sexton MD, Anum Malone, DO, Toña Macedo MD, Sol Cerda MD, Zander Coyne, CNP,  Ines Rivera, CNP, Leda Garcia, CNP, Anton Márquez, CNP,  Josie Frey, [facility], Darling Multani, CNP, Melissa Schmidt, CNP, Katie Peñaloza, CNP, Lashae Kang, CNP, Jerrod Randhawa, CNP, Michelet Brody PA-C, Janene Vera, CNS, Rosales Jacobsen, CNP, Elke Oneal, [address]      Daily Progress Note     Admit Date: 11/29/2022  Bed/Room No.  0108/0108-01  Admitting Physician : Ana Jain MD  Code Status :[facility] Day:  LOS: 10 days   Chief Complaint:     No chief complaint on file. Principal Problem:    Hemorrhagic shock ([address]  Active Problems:    Upper GI bleed    Atrial fibrillation with rapid ventricular response (HCC)    Essential hypertension    S/P CABG (coronary artery bypass graft)    Anemia due to blood loss, acute    Type 2 diabetes mellitus with hyperglycemia, with long-term current use of insulin (HCC)    Orthostatic dizziness    Lethargy    Demand ischemia (HCC)  Resolved Problems:    * No resolved hospital problems. *    Subjective : Interval History/Significant events :  12/09/22    Patient is lethargic and not responding much . He has been having intermittent hypoxia. He is on supplemental oxygen. Patient remains in NSR and heart rate is controlled. He has not been able to eat or take medications. Wife is at bedside . He was agitated overnight and kicking at nurses.    Patient currently has bilateral wrist restraints in place. Vitals - Stable afebrile  Labs - Hypernatremia. Elevated creatinine     Nursing notes , Consults notes reviewed. Overnight events and updates discussed with Nursing staff . Background History:         Calos Perez is 68 y.o. male  Who was admitted to the hospital on 11/29/2022 for treatment of Hemorrhagic shock ([facility] [address] [facility]. Patient has underlying history of CAD with CABG 2 months before, A. fib on Coumadin, diabetes mellitus, hypertension. [facility] and did not show any active bleed from duodenal ulcer. Patient had severe anemia 6.8. Patient was intubated secondary to respiratory failure and transferred to [place]. Patient was given multiple transfusion. Coagulopathy from Coumadin was reversed with FFP, cryo, Kcentra, vitamin K. Patient underwent EGD on 11/28/2022 and showed duodenitis, nonbleeding duodenal ulcer with a visible vessel that was cauterized with bipolar probe. Patient was treated with prolonged Protonix infusion and hold anticoagulants and antiplatelet medications were held. Patient also required pressor support and fluid resuscitation in ICU. Patient was successfully extubated and treated with NIV postextubation. Patient continued to have tarry stools. Aspirin was resumed due to recent CAD and CABG after GI clearance on 12/6/2022. Patient also has history of A. fib and has been having rapid ventricular response. He also treated with amiodarone. Heparin drip was started on 12/4/2022 and hemoglobin remained stable. PMH:  Past Medical History:   Diagnosis Date    A-fib ([facility] [address]     CAD (coronary artery disease)     Depression     DM2 (diabetes mellitus, type 2) ([facility])     HTN (hypertension)       Allergies:    Allergies   Allergen Reactions    Atorvastatin Hives      Medications :  insulin glargine, 40 Units, SubCUTAneous, Nightly  ezetimibe, 10 mg, Oral, Nightly  famotidine (PEPCID) injection, 20 mg, IntraVENous, BID  amiodarone, 150 mg, IntraVENous, Once  tamsulosin, 0.4 mg, Oral, Daily  heparin (porcine), 5,000 Units, SubCUTAneous, 3 times per day  dextrose, 25 g, IntraVENous, Once  insulin lispro, 0-4 Units, SubCUTAneous, TID WC  insulin lispro, 0-4 Units, SubCUTAneous, Nightly  neomycin-bacitracin-polymyxin, , Topical, BID  aspirin, 81 mg, Oral, Daily  amiodarone, 200 mg, Oral, Daily  pantoprazole (PROTONIX) 40 mg injection, 40 mg, IntraVENous, Q12H  sodium chloride flush, 5-40 mL, IntraVENous, 2 times per day        Review of Systems   Review of Systems   Unable to perform ROS: Mental status change   Psychiatric/Behavioral:  Positive for agitation, behavioral problems and sleep disturbance.     Objective :      Current Vitals : Temp: (P) 98.9 °F (37.2 °C),  Heart Rate: 94, Resp: 19, BP: 138/78, SpO2: 98 %  Last 24 Hrs Vitals   Patient Vitals for the past 24 hrs:   BP Temp Temp src Pulse Resp SpO2   12/09/22 0405 138/78 (P) 98.9 °F (37.2 °C) -- 94 19 98 %   12/09/22 0333 -- -- -- -- 16 --   12/09/22 0330 (!) 149/67 98.5 °F (36.9 °C) Temporal 96 18 97 %   12/09/22 0200 118/74 (P) 97.5 °F (36.4 °C) -- (!) 114 19 100 %   12/09/22 0019 -- -- -- -- 16 --   12/09/22 0000 131/63 98.1 °F (36.7 °C) Temporal (!) 110 19 94 %   12/08/22 2300 135/71 98.4 °F (36.9 °C) Oral 86 17 93 %   12/08/22 1923 (!) 95/48 (P) 99.5 °F (37.5 °C) -- 83 (P) 16 92 %   12/08/22 1902 (!) 86/49 -- -- 84 22 93 %   12/08/22 1800 107/81 -- -- 94 22 91 %   12/08/22 1700 (!) 104/43 -- -- 87 22 92 %   12/08/22 1630 (!) 113/52 -- -- 87 20 93 %   12/08/22 1600 (!) 113/59 -- -- 94 21 94 %   12/08/22 1530 103/81 -- -- 95 24 97 %   12/08/22 1505 (!) 111/57 -- -- 97 24 94 %   12/08/22 1500 91/70 -- -- (!) 112 17 95 %   12/08/22 1455 (!) 101/58 -- -- (!) 112 (!) 32 94 %   12/08/22 1450 (!) 98/59 -- -- (!) 109 20 92 %   12/08/22 1445 (!) 103/50 -- -- (!) 108 26 92 %   12/08/22 1440 (!) 87/57 -- -- (!) 111 25 92 %   12/08/22 1435 (!) 87/52 -- -- (!) 114 24 94 %   12/08/22 1430 (!) 91/54 -- -- (!) 112 25 91 %   12/08/22 1425 (!) 83/52 -- -- (!) 114 22 93 %   12/08/22 1420 (!) 89/58 -- -- (!) 117 26 95 %   12/08/22 1415 100/69 -- -- (!) 115 26 100 %   12/08/22 1410 98/84 -- -- (!) 115 16 100 %   12/08/22 1356 116/81 -- -- (!) 107 26 100 %   12/08/22 1350 92/78 -- -- (!) 102 22 100 %   12/08/22 1345 (!) 142/71 -- -- (!) 124 (!) 32 100 %   12/08/22 1342 (!) 156/99 -- -- (!) 148 30 100 %   12/08/22 1341 (!) 156/99 -- -- (!) 153 (!) 32 100 %   12/08/22 1330 (!) 149/94 -- -- (!) 151 30 100 %   12/08/22 1315 (!) 144/82 100.1 °F (37.8 °C) Oral (!) 138 (!) 32 (!) 88 %   12/08/22 1300 (!) 152/72 -- -- 95 22 96 %   12/08/22 1245 (!) 143/65 -- -- 93 19 92 %   12/08/22 1230 (!) 127/57 -- -- 100 21 94 %   12/08/22 1227 (!) 128/55 99.2 °F (37.3 °C) Temporal (!) 101 25 92 %   12/08/22 1157 -- 99 °F (37.2 °C) Temporal -- -- --   12/08/22 1154 110/72 -- -- 93 15 94 %   12/08/22 0842 (!) 165/73 -- -- (!) 104 18 95 %   12/08/22 0840 -- 98.9 °F (37.2 °C) Temporal -- -- --     Intake / output   12/07 1901 - 12/09 0700  In: 3936.8 [P.O.:840; I.V.:2387.7]  Out: 9572 [Urine:2925]  Physical Exam:  Physical Exam  Vitals and nursing note reviewed. Constitutional:       General: He is not in acute distress. Appearance: He is not diaphoretic. HENT:      Head: Normocephalic and atraumatic. Nose:      Right Sinus: No maxillary sinus tenderness or frontal sinus tenderness. Left Sinus: No maxillary sinus tenderness or frontal sinus tenderness. Mouth/Throat:      Pharynx: No oropharyngeal exudate. Eyes:      General: No scleral icterus. Conjunctiva/sclera: Conjunctivae normal.      Pupils: Pupils are equal, round, and reactive to light. Neck:      Thyroid: No thyromegaly. Vascular: No JVD. Cardiovascular:      Rate and Rhythm: Normal rate and regular rhythm. Pulses:           Dorsalis pedis pulses are 2+ on the right side and 2+ on the left side. Heart sounds: Normal heart sounds. No murmur heard. Pulmonary:      Effort: Pulmonary effort is normal.      Breath sounds: Normal breath sounds. No wheezing or rales. Abdominal:      Palpations: Abdomen is soft. There is no mass. Tenderness: There is no abdominal tenderness. Musculoskeletal:      Cervical back: Full passive range of motion without pain and neck supple. Lymphadenopathy:      Head:      Right side of head: No submandibular adenopathy. Left side of head: No submandibular adenopathy. Cervical: No cervical adenopathy. Skin:     General: Skin is warm. Neurological:      Mental Status: He is lethargic and disoriented. Motor: No tremor. Psychiatric:         Behavior: Behavior is uncooperative and slowed. Laboratory findings:    Recent Labs     12/08/22  0441 12/08/22  1348 12/09/22  0350   WBC 6.0 6.2 7.4   HGB 7.9* 9.5* 7.9*   HCT 25.6* 30.3* 25.6*    326 297   INR  --  1.1  --      Recent Labs     12/06/22  0744 12/07/22  0411 12/08/22  0441 12/08/22  1348 12/08/22  2307 12/09/22  0350   *   < > 140 140  --  146*   K 3.1*   < > 3.5* 3.5* 3.5* 3.6*   *   < > 103 103  --  107   CO2 24   < > 25 23  --  22   GLUCOSE 81   < > 298* 233*  --  333*   BUN 16   < > 12 13  --  14   CREATININE 0.94   < > 1.13 1.14  --  1.39*   MG 1.8  --  1.9 1.9  --   --    CALCIUM 7.8*   < > 8.0* 8.3*  --  8.2*    < > = values in this interval not displayed. Recent Labs     12/08/22  1348   *          RBC, UA   Date Value Ref Range Status   11/29/2022 2 TO 5 0 - 4 /HPF Final     Comment:     Reference range defined for non-centrifuged specimen. WBC, UA   Date Value Ref Range Status   11/29/2022 20 TO 50 0 - 5 /HPF Final       Imaging / Clinical Data :-   CT HEAD WO CONTRAST    Result Date: 12/2/2022  No acute intracranial abnormality.      XR CHEST PORTABLE    Result Date: 12/8/2022  Removal of the endotracheal and gastric tubes. Improved bibasilar airspace opacities. XR CHEST PORTABLE    Result Date: 12/4/2022  Increased left basilar atelectasis or pneumonia. Mild central vascular congestion. XR CHEST PORTABLE    Result Date: 12/2/2022  1. Endotracheal tube in expected position. 2. Bibasilar pulmonary opacities, atelectasis versus pneumonia. XR ABDOMEN FOR NG/OG/NE TUBE PLACEMENT    Result Date: 12/2/2022  Enteric tube in expected position. Clinical Course : gradually improving  Assessment and Plan  :        Upper GI bleed due to duodenal ulcer and duodenitis -underwent EGD with epinephrine injection into the ulcer. Treated with Protonix infusion currently on twice daily dose. CAD s/p CABG on 10/25/2022 -continue aspirin. Monitor for GI bleed. Paroxysmal atrial fibrillation -Coumadin on hold due to duodenal ulcer. Continue amiodarone. Cardiology following. Essential hypertension -low-dose beta-blocker with parameters  Type 2 diabetes mellitus-Lantus dose increased. Hypovolemic shock -blood pressure stable now after fluid resuscitation and pressor support. Urinary retention -on Flomax. Anemia due to acute blood loss -iron supplement. Delirium -due to prolonged hospitalization and medications. Avoid sedatives, benzodiazepines. Discussed with patient's wife at bedside and patient's son Anni Cohen over the phone. Check CT head to rule out intracranial event due to fluctuating mental status. Continue to monitor vitals , Intake / output ,  Cell count , HGB , Kidney function, oxygenation  as indicated . Plan and updates discussed with patient ,  answers  explained to satisfaction.    Plan discussed with Staff Zak Jeffrey  RN     (Please note that portions of this note were completed with a voice recognition program. Efforts were made to edit the dictations but occasionally words are mis-transcribed.)      Romi Hdez MD  12/9/2022

## 2022-12-09 NOTE — PROGRESS NOTES
Patient became very agitated and started to swing and kick at nurses and sitter in room. Writer advised primary team, order for bilateral wrist restraints given.

## 2022-12-09 NOTE — PROGRESS NOTES
Occupational [facility]  Occupational Therapy Not Seen Note    DATE: 2022    NAME: Kaley Sullivan  MRN: [id number]   : 1945      Patient not seen this date for Occupational Therapy due to:    Patient is not appropriate for active participation in OT treatment at this time d/t pt in restraints and experiencing respiratory distress.     Next Scheduled Treatment: 12/10    Electronically signed by CLIFFORD Quevedo on 2022 at 1:14 PM

## 2022-12-09 NOTE — PROGRESS NOTES
Physical Therapy        Physical Therapy Cancel Note      DATE: 2022    NAME: Raad Nolan  MRN: [id number]   : 1945      Patient not seen this date for Physical Therapy due to:    Patient is not appropriate for PT evaluation/treatment at this time d/t d/t pt in restraints and experiencing respiratory distress PT to further address as indicated .       Electronically signed by Artemio Richter PT on 2022 at 3:16 PM

## 2022-12-09 NOTE — CARE COORDINATION
Transitional Planning  Spoke with pt and his spouse at bedside. Goal is home with [place]. After reviewing therapy discussed having a backup plan with a SNF. Instructed her to choose three and list with numbers for preference of choice. Post Acute Facility/Agency List     Provided spouse with the following list, the list includes the overall star ratings obtained from CMS per the Medicare Web site (www.Medicare.gov):     [] [address]  [] Acute Inpatient Rehabilitation Facilities  [x] Skilled Nursing Facilities  [] Home Care    Provided verbal instructions on how to utilize the QR Code to obtain additional detailed star ratings from www. Medicare. gov     offered to print and provide the detailed list:    []Accepted   [x]Declined    The following printed detailed lists were provided:     SNF provider list  Pt's spouse provided the following choices in order of preference  #1 [address]  #2 [facility]  #3 [address]  Referrals sent to all of the above

## 2022-12-10 LAB
ANION GAP SERPL CALCULATED.3IONS-SCNC: 14 MMOL/L (ref 9–17)
BUN BLDV-MCNC: 11 MG/DL (ref 8–23)
CALCIUM SERPL-MCNC: 7.8 MG/DL (ref 8.6–10.4)
CHLORIDE BLD-SCNC: 104 MMOL/L (ref 98–107)
CO2: 24 MMOL/L (ref 20–31)
CREAT SERPL-MCNC: 1.03 MG/DL (ref 0.7–1.2)
GFR SERPL CREATININE-BSD FRML MDRD: >60 ML/MIN/1.73M2
GLUCOSE BLD-MCNC: 207 MG/DL (ref 75–110)
GLUCOSE BLD-MCNC: 222 MG/DL (ref 75–110)
GLUCOSE BLD-MCNC: 236 MG/DL (ref 75–110)
GLUCOSE BLD-MCNC: 280 MG/DL (ref 75–110)
GLUCOSE BLD-MCNC: 301 MG/DL (ref 70–99)
HCT VFR BLD CALC: 27.1 % (ref 40.7–50.3)
HCT VFR BLD CALC: 29.1 % (ref 40.7–50.3)
HEMOGLOBIN: 8.8 G/DL (ref 13–17)
HEMOGLOBIN: 9.1 G/DL (ref 13–17)
MAGNESIUM: 2.1 MG/DL (ref 1.6–2.6)
MCH RBC QN AUTO: 29.4 PG (ref 25.2–33.5)
MCHC RBC AUTO-ENTMCNC: 32.5 G/DL (ref 28.4–34.8)
MCV RBC AUTO: 90.6 FL (ref 82.6–102.9)
NRBC AUTOMATED: 0 PER 100 WBC
PDW BLD-RTO: 16.3 % (ref 11.8–14.4)
PLATELET # BLD: 378 K/UL (ref 138–453)
PMV BLD AUTO: 10.6 FL (ref 8.1–13.5)
POTASSIUM SERPL-SCNC: 3.9 MMOL/L (ref 3.7–5.3)
RBC # BLD: 2.99 M/UL (ref 4.21–5.77)
SODIUM BLD-SCNC: 142 MMOL/L (ref 135–144)
WBC # BLD: 9.9 K/UL (ref 3.5–11.3)

## 2022-12-10 PROCEDURE — 6370000000 HC RX 637 (ALT 250 FOR IP): Performed by: INTERNAL MEDICINE

## 2022-12-10 PROCEDURE — 85027 COMPLETE CBC AUTOMATED: CPT

## 2022-12-10 PROCEDURE — 2580000003 HC RX 258: Performed by: INTERNAL MEDICINE

## 2022-12-10 PROCEDURE — 36415 COLL VENOUS BLD VENIPUNCTURE: CPT

## 2022-12-10 PROCEDURE — 6360000002 HC RX W HCPCS: Performed by: SURGERY

## 2022-12-10 PROCEDURE — C9113 INJ PANTOPRAZOLE SODIUM, VIA: HCPCS | Performed by: STUDENT IN AN ORGANIZED HEALTH CARE EDUCATION/TRAINING PROGRAM

## 2022-12-10 PROCEDURE — 82947 ASSAY GLUCOSE BLOOD QUANT: CPT

## 2022-12-10 PROCEDURE — 2580000003 HC RX 258: Performed by: FAMILY MEDICINE

## 2022-12-10 PROCEDURE — 6360000002 HC RX W HCPCS: Performed by: STUDENT IN AN ORGANIZED HEALTH CARE EDUCATION/TRAINING PROGRAM

## 2022-12-10 PROCEDURE — 6370000000 HC RX 637 (ALT 250 FOR IP): Performed by: FAMILY MEDICINE

## 2022-12-10 PROCEDURE — 83735 ASSAY OF MAGNESIUM: CPT

## 2022-12-10 PROCEDURE — 80048 BASIC METABOLIC PNL TOTAL CA: CPT

## 2022-12-10 PROCEDURE — 2580000003 HC RX 258: Performed by: STUDENT IN AN ORGANIZED HEALTH CARE EDUCATION/TRAINING PROGRAM

## 2022-12-10 PROCEDURE — 99232 SBSQ HOSP IP/OBS MODERATE 35: CPT | Performed by: FAMILY MEDICINE

## 2022-12-10 PROCEDURE — 85018 HEMOGLOBIN: CPT

## 2022-12-10 PROCEDURE — 6360000002 HC RX W HCPCS

## 2022-12-10 PROCEDURE — 6370000000 HC RX 637 (ALT 250 FOR IP): Performed by: NURSE PRACTITIONER

## 2022-12-10 PROCEDURE — 2060000000 HC ICU INTERMEDIATE R&B

## 2022-12-10 PROCEDURE — 85014 HEMATOCRIT: CPT

## 2022-12-10 PROCEDURE — 2500000003 HC RX 250 WO HCPCS: Performed by: INTERNAL MEDICINE

## 2022-12-10 RX ORDER — INSULIN LISPRO 100 [IU]/ML
0-4 INJECTION, SOLUTION INTRAVENOUS; SUBCUTANEOUS NIGHTLY
Status: DISCONTINUED | OUTPATIENT
Start: 2022-12-10 | End: 2022-12-14 | Stop reason: HOSPADM

## 2022-12-10 RX ORDER — SODIUM CHLORIDE 9 MG/ML
INJECTION, SOLUTION INTRAVENOUS CONTINUOUS
Status: DISCONTINUED | OUTPATIENT
Start: 2022-12-10 | End: 2022-12-11

## 2022-12-10 RX ORDER — FUROSEMIDE 10 MG/ML
20 INJECTION INTRAMUSCULAR; INTRAVENOUS ONCE
Status: COMPLETED | OUTPATIENT
Start: 2022-12-10 | End: 2022-12-10

## 2022-12-10 RX ORDER — PANTOPRAZOLE SODIUM 40 MG/1
40 TABLET, DELAYED RELEASE ORAL
Status: DISCONTINUED | OUTPATIENT
Start: 2022-12-10 | End: 2022-12-14 | Stop reason: HOSPADM

## 2022-12-10 RX ORDER — SUCRALFATE 1 G/1
1 TABLET ORAL
Status: DISCONTINUED | OUTPATIENT
Start: 2022-12-10 | End: 2022-12-14 | Stop reason: HOSPADM

## 2022-12-10 RX ORDER — INSULIN LISPRO 100 [IU]/ML
0-16 INJECTION, SOLUTION INTRAVENOUS; SUBCUTANEOUS
Status: DISCONTINUED | OUTPATIENT
Start: 2022-12-10 | End: 2022-12-14 | Stop reason: HOSPADM

## 2022-12-10 RX ORDER — PROCHLORPERAZINE EDISYLATE 5 MG/ML
10 INJECTION INTRAMUSCULAR; INTRAVENOUS EVERY 6 HOURS PRN
Status: DISCONTINUED | OUTPATIENT
Start: 2022-12-10 | End: 2022-12-11

## 2022-12-10 RX ADMIN — SODIUM CHLORIDE, PRESERVATIVE FREE 10 ML: 5 INJECTION INTRAVENOUS at 08:46

## 2022-12-10 RX ADMIN — HEPARIN SODIUM 5000 UNITS: 5000 INJECTION INTRAVENOUS; SUBCUTANEOUS at 05:49

## 2022-12-10 RX ADMIN — ASPIRIN 81 MG: 81 TABLET, CHEWABLE ORAL at 08:45

## 2022-12-10 RX ADMIN — EZETIMIBE 10 MG: 10 TABLET ORAL at 21:03

## 2022-12-10 RX ADMIN — FERROUS SULFATE TAB EC 325 MG (65 MG FE EQUIVALENT) 325 MG: 325 (65 FE) TABLET DELAYED RESPONSE at 16:25

## 2022-12-10 RX ADMIN — SODIUM CHLORIDE, PRESERVATIVE FREE 20 MG: 5 INJECTION INTRAVENOUS at 08:45

## 2022-12-10 RX ADMIN — ONDANSETRON 4 MG: 2 INJECTION INTRAMUSCULAR; INTRAVENOUS at 04:43

## 2022-12-10 RX ADMIN — ONDANSETRON 4 MG: 2 INJECTION INTRAMUSCULAR; INTRAVENOUS at 12:22

## 2022-12-10 RX ADMIN — SUCRALFATE 1 G: 1 TABLET ORAL at 16:25

## 2022-12-10 RX ADMIN — FUROSEMIDE 20 MG: 10 INJECTION, SOLUTION INTRAMUSCULAR; INTRAVENOUS at 16:24

## 2022-12-10 RX ADMIN — INSULIN LISPRO 4 UNITS: 100 INJECTION, SOLUTION INTRAVENOUS; SUBCUTANEOUS at 12:27

## 2022-12-10 RX ADMIN — SODIUM CHLORIDE, PRESERVATIVE FREE 40 MG: 5 INJECTION INTRAVENOUS at 04:01

## 2022-12-10 RX ADMIN — ESCITALOPRAM OXALATE 10 MG: 10 TABLET ORAL at 08:44

## 2022-12-10 RX ADMIN — INSULIN GLARGINE 20 UNITS: 100 INJECTION, SOLUTION SUBCUTANEOUS at 21:14

## 2022-12-10 RX ADMIN — SODIUM CHLORIDE, PRESERVATIVE FREE 20 MG: 5 INJECTION INTRAVENOUS at 21:02

## 2022-12-10 RX ADMIN — SODIUM CHLORIDE: 9 INJECTION, SOLUTION INTRAVENOUS at 16:52

## 2022-12-10 RX ADMIN — INSULIN LISPRO 8 UNITS: 100 INJECTION, SOLUTION INTRAVENOUS; SUBCUTANEOUS at 08:59

## 2022-12-10 RX ADMIN — AMIODARONE HYDROCHLORIDE 200 MG: 200 TABLET ORAL at 08:45

## 2022-12-10 RX ADMIN — FERROUS SULFATE TAB EC 325 MG (65 MG FE EQUIVALENT) 325 MG: 325 (65 FE) TABLET DELAYED RESPONSE at 08:45

## 2022-12-10 RX ADMIN — POLYMYXIN B SULFATE, BACITRACIN ZINC, NEOMYCIN SULFATE: 5000; 3.5; 4 OINTMENT TOPICAL at 08:45

## 2022-12-10 RX ADMIN — INSULIN GLARGINE 20 UNITS: 100 INJECTION, SOLUTION SUBCUTANEOUS at 09:00

## 2022-12-10 RX ADMIN — METOPROLOL TARTRATE 25 MG: 25 TABLET ORAL at 21:03

## 2022-12-10 RX ADMIN — POLYMYXIN B SULFATE, BACITRACIN ZINC, NEOMYCIN SULFATE: 5000; 3.5; 4 OINTMENT TOPICAL at 21:03

## 2022-12-10 RX ADMIN — METOPROLOL TARTRATE 25 MG: 25 TABLET ORAL at 08:44

## 2022-12-10 RX ADMIN — INSULIN LISPRO 4 UNITS: 100 INJECTION, SOLUTION INTRAVENOUS; SUBCUTANEOUS at 16:54

## 2022-12-10 RX ADMIN — TAMSULOSIN HYDROCHLORIDE 0.4 MG: 0.4 CAPSULE ORAL at 08:44

## 2022-12-10 RX ADMIN — PANTOPRAZOLE SODIUM 40 MG: 40 TABLET, DELAYED RELEASE ORAL at 16:25

## 2022-12-10 RX ADMIN — SUCRALFATE 1 G: 1 TABLET ORAL at 21:11

## 2022-12-10 ASSESSMENT — PAIN SCALES - GENERAL: PAINLEVEL_OUTOF10: 0

## 2022-12-10 NOTE — PROGRESS NOTES
[facility]  Progress Note                   Date:   12/10/2022  Patient name: Radha Bang  Date of admission:  11/29/2022 12:20 PM  MRN:   [id number]  YOB: 1945  PCP: Saloni Brock    Reason for Admission: Upper GI bleed [K92.2]    Subjective:       Clinical Changes /Abnormalities: Patient seen and examined. Denies chest pain, on 6 L facemask. Tele/vitals/labs reviewed . Discussed case with RN. Had episode of Afib RVR yesterday during PRBC transfusion. Converted to sinus rhythm this a.m.   Review of Systems    Medications:   Scheduled Meds:   insulin lispro  0-16 Units SubCUTAneous TID WC    insulin lispro  0-4 Units SubCUTAneous Nightly    amiodarone  200 mg Oral Daily    metoprolol tartrate  25 mg Oral BID    ferrous sulfate  325 mg Oral BID WC    escitalopram  10 mg Oral Daily    insulin glargine  20 Units SubCUTAneous BID    ezetimibe  10 mg Oral Nightly    famotidine (PEPCID) injection  20 mg IntraVENous BID    tamsulosin  0.4 mg Oral Daily    heparin (porcine)  5,000 Units SubCUTAneous 3 times per day    dextrose  25 g IntraVENous Once    neomycin-bacitracin-polymyxin   Topical BID    aspirin  81 mg Oral Daily    pantoprazole (PROTONIX) 40 mg injection  40 mg IntraVENous Q12H    sodium chloride flush  5-40 mL IntraVENous 2 times per day     Continuous Infusions:   sodium chloride      dextrose      sodium chloride Stopped (12/06/22 0512)     CBC:   Recent Labs     12/08/22  1348 12/09/22  0350 12/09/22  1330 12/09/22  1823 12/10/22  0255   WBC 6.2 7.4  --   --  9.9   HGB 9.5* 7.9* 9.2* 9.1* 8.8*    297  --   --  378       BMP:    Recent Labs     12/08/22  1348 12/08/22  2307 12/09/22  0350 12/10/22  0805     --  146* 142   K 3.5* 3.5* 3.6* 3.9     --  107 104   CO2 23  --  22 24   BUN 13  --  14 11   CREATININE 1.14  --  1.39* 1.03   GLUCOSE 233*  --  333* 301*       Hepatic:No results for input(s): AST, ALT, ALB, BILITOT, ALKPHOS in the last 72 hours.  Troponin: No results for input(s): TROPHS in the last 72 hours. BNP: No results for input(s): BNP in the last 72 hours. Lipids: No results for input(s): CHOL, HDL in the last 72 hours. Invalid input(s): LDLCALCU  INR:   Recent Labs     12/08/22  1348   INR 1.1         Objective:   Vitals: /67   Pulse 66   Temp 98.2 °F (36.8 °C) (Oral)   Resp 18   Ht 6' 2\" (1.88 m)   Wt 199 lb 11.8 oz (90.6 kg)   SpO2 98%   BMI 25.64 kg/m²   General appearance: alert and cooperative with exam  HEENT: Head: Normocephalic, no lesions, without obvious abnormality. Neck:no JVD, trachea midline, no adenopathy  Lungs: Diminished throughout  Heart: regular rate and rhythm, s1/s2 auscultated, no murmurs,  Abdomen: soft, non-tender, bowel sounds active  Extremities: no edema  Neurologic: not done    04/2018  The left ventricle is normal size. There is normal left ventricular wall thickness. Left ventricle systolic function is normal.      The Ejection Fraction is 55-60%. Grade I diastolic dysfunction. Cannot rule out anteroapical hypokinesis seen only in apical 4 chamber views although endocardium   was not sen well. Echo 04/2022    Left Ventricle: Left ventricle appears normal in size. Wall thickness   is normal. Systolic function is low normal to mildly decreased with an   ejection fraction of 50-55%. Left Atrium: Left atrium is normal in size. The left atrial volume   index is 27.1 mL/m2. Right Ventricle: Right ventricular size appears normal. The right   ventricular basal diameter is 35.0 mm. Systolic function is normal.     Mitral Valve: The leaflets are mildly thickened and exhibit normal   excursion. Aortic Valve: The aortic valve is congenitally bicuspid. The leaflets   exhibit normal excursion. There is moderate sclerosis. There is no   regurgitation. There is mild stenosis. The calculated aortic valve area is   1.69 cm2.  The calculated aortic valve peak gradient is 11.83 mmHg. The   calculated aortic valve mean gradient is 6.00 mmHg. Assessment / Acute Cardiac Problems:   Shock likely hypovolemic secondary to bleeding duodenal ulcer, currently on pressor support. Status post EGD 11/29/2022 with bipolar cautery of bleeding duodenal ulcer. CAD status post PCI X 3 in 2012 and CABG X2 with LIMA to distal LAD, SVG to OM1 with exploration of distal PDA 10/25/2022. A. fib which was newly diagnosed in November 2022, on Coumadin and amiodarone. Preserved LVEF on echocardiogram as above. Elevated troponin 910 with mild uptrend. Likely secondary to bleed. Acute hypoxic respiratory failure secondary to above. Bicuspid Aortic valve. Hypertension. Hyperlipidemia. Chronic LBBB. Patient Active Problem List:     Upper GI bleed     Atrial fibrillation with rapid ventricular response (HCC)     Essential hypertension     S/P CABG (coronary artery bypass graft)     Anemia due to blood loss, acute     Type 2 diabetes mellitus with hyperglycemia, with long-term current use of insulin (HCC)     Hemorrhagic shock ([facility])     Orthostatic dizziness     Lethargy     Demand ischemia ([facility] [address]      Plan of Treatment:   History of A. fib RVR converted back to sinus rhythm  -continue p.o. Amio and  Lopressor. Continue to hold off on AC at this time. Consider restarting if/when HGB remains stable as OP however GI notes indicate ulcer is high risk for re-bleed  Lungs Diminished throughout with few crackles to   bases-we will order Lasix 20 mg IV once  Echo reviewed as above  Keep K>4, Mg>2 .  Will add mag level     Electronically signed by JERALD Blanton NP on 12/10/2022 at [address] [place] [address].  [phone]

## 2022-12-10 NOTE — PROGRESS NOTES
[facility]  Office: [address], , Echo Kacey, DO, Sonido Kaye, DO, Ayesha Paulina Zabala, DO, Oumar Bell MD, Armin Bustos MD, Janelle Workman MD, Bernardino Orourke MD,  Deangelo Lal MD, Alyse Vuong MD, Shorty Michaels, DO, Sachin Zepeda MD,  Camila Langford MD, Sandip Beckman MD, Mariposa Mcneil DO, Irvin Sow MD, Naomi Fernández MD, Hardik Grace DO, Maritza Miller MD, Vielka Love MD, Tanya Paz MD, Laila Montesinos MD, Walter Aguilar DO, Jacinto Landaverde MD, Edi Bullock MD, Eduardo Fierro, [facility],  Radha Saldivar, CNP, Ortiz Pichardo, CNP, Mckenna Vazquez, CNP,  Waldo Mccarthy, [facility], Bisi Mauricio, CNP, Na Lew, CNP, Norbert Yakov, CNP, Jordan Ga, CNP, Amanda Bains, CNP, Pola Orosco PA-C, Ulises Long, CNS, Titi Nash, CNP, Kimmy Bryan, [facility]      Daily Progress Note     Admit Date: 11/29/2022  Bed/Room No.  0108/0108-01  Admitting Physician : Janelle Workman MD  Code Status :[address] [facility] Day:  LOS: 11 days   Chief Complaint:     No chief complaint on file. Principal Problem:    Hemorrhagic shock ([facility] [address]  Active Problems:    Upper GI bleed    Atrial fibrillation with rapid ventricular response (HCC)    Essential hypertension    S/P CABG (coronary artery bypass graft)    Anemia due to blood loss, acute    Type 2 diabetes mellitus with hyperglycemia, with long-term current use of insulin (HCC)    Orthostatic dizziness    Lethargy    Demand ischemia (HCC)  Resolved Problems:    * No resolved hospital problems. *    Subjective : Interval History/Significant events :  12/10/22    Patient is alert, oriented. He is having cough with white sputum. Patient had dark stool yesterday. He denies any abdominal pain, nausea, vomiting. Vitals - Stable afebrile  Labs -hyperglycemia. Creatinine 1.03. Hemoglobin 8.8. Nursing notes , Consults notes reviewed.  Overnight events and updates discussed with Nursing staff . Background History:         Albino Govea is 68 y.o. male  Who was admitted to the hospital on 11/29/2022 for treatment of Hemorrhagic shock ([facility] [address] Patient was life flighted from [facility] for rectal bleed at outlying facility. Patient has underlying history of CAD with CABG 2 months before, A. fib on Coumadin, diabetes mellitus, hypertension. Patient had EGD x2 at outlying facility and did not show any active bleed from duodenal ulcer. Patient had severe anemia 6.8. Patient was intubated secondary to respiratory failure and transferred to [place]. Patient was given multiple transfusion. Coagulopathy from Coumadin was reversed with FFP, cryo, Kcentra, vitamin K. Patient underwent EGD on 11/28/2022 and showed duodenitis, nonbleeding duodenal ulcer with a visible vessel that was cauterized with bipolar probe. Patient was treated with prolonged Protonix infusion and hold anticoagulants and antiplatelet medications were held. Patient also required pressor support and fluid resuscitation in ICU. Patient was successfully extubated and treated with NIV postextubation. Patient continued to have tarry stools. Aspirin was resumed due to recent CAD and CABG after GI clearance on 12/6/2022. Patient also has history of A. fib and has been having rapid ventricular response. He also treated with amiodarone. Heparin drip was started on 12/4/2022 and hemoglobin remained stable. PMH:  Past Medical History:   Diagnosis Date    A-fib ([facility] [address]     CAD (coronary artery disease)     Depression     DM2 (diabetes mellitus, type 2) ([facility])     HTN (hypertension)       Allergies:    Allergies   Allergen Reactions    Atorvastatin Hives      Medications :  amiodarone, 200 mg, Oral, Daily  metoprolol tartrate, 25 mg, Oral, BID  ferrous sulfate, 325 mg, Oral, BID WC  escitalopram, 10 mg, Oral, Daily  insulin glargine, 20 Units, SubCUTAneous, BID  ezetimibe, 10 mg, Oral, Nightly  famotidine (PEPCID) injection, 20 mg, IntraVENous, BID  amiodarone, 150 mg, IntraVENous, Once  tamsulosin, 0.4 mg, Oral, Daily  heparin (porcine), 5,000 Units, SubCUTAneous, 3 times per day  dextrose, 25 g, IntraVENous, Once  insulin lispro, 0-4 Units, SubCUTAneous, TID WC  insulin lispro, 0-4 Units, SubCUTAneous, Nightly  neomycin-bacitracin-polymyxin, , Topical, BID  aspirin, 81 mg, Oral, Daily  pantoprazole (PROTONIX) 40 mg injection, 40 mg, IntraVENous, Q12H  sodium chloride flush, 5-40 mL, IntraVENous, 2 times per day      Review of Systems   Review of Systems   Constitutional:  Positive for fatigue. Negative for activity change, appetite change, chills, fever and unexpected weight change. HENT:  Positive for trouble swallowing. Negative for congestion, mouth sores, postnasal drip, sinus pressure, sore throat and voice change. Eyes:  Negative for visual disturbance. Respiratory:  Positive for shortness of breath. Negative for cough and wheezing. Cardiovascular:  Negative for chest pain and palpitations. Gastrointestinal:  Negative for abdominal pain, blood in stool, constipation, diarrhea, nausea and vomiting. Endocrine: Negative for polyuria. Genitourinary:  Negative for difficulty urinating, dysuria, frequency and urgency. Musculoskeletal:  Negative for arthralgias, joint swelling and myalgias. Neurological:  Negative for dizziness, tremors, speech difficulty, light-headedness and headaches. Psychiatric/Behavioral:  Positive for sleep disturbance. Negative for agitation and behavioral problems.     Objective :      Current Vitals : Temp: 97.3 °F (36.3 °C),  Heart Rate: 82, Resp: 24, BP: 127/66, SpO2: 96 %  Last 24 Hrs Vitals   Patient Vitals for the past 24 hrs:   BP Temp Temp src Pulse Resp SpO2 Height   12/10/22 0700 127/66 97.3 °F (36.3 °C) Axillary 82 24 96 % --   12/10/22 0600 (!) 169/76 -- -- 85 19 93 % --   12/10/22 0500 (!) 162/69 -- -- 85 18 93 % --   12/10/22 0415 (!) 142/91 -- -- 79 19 97 % --   12/10/22 0317 134/65 98.2 °F (36.8 °C) Axillary 82 18 96 % --   12/10/22 0200 (!) 132/115 -- -- 84 20 93 % --   12/10/22 0100 (!) 147/68 -- -- 87 24 95 % --   12/09/22 2345 -- -- -- -- -- 95 % --   12/09/22 2300 (!) 157/77 99 °F (37.2 °C) Axillary 90 18 96 % --   12/09/22 2200 (!) 152/63 -- -- 91 22 93 % --   12/09/22 2100 (!) 176/78 -- -- 93 24 92 % --   12/09/22 2030 (!) 169/81 -- -- 93 23 93 % --   12/09/22 2000 (!) 165/84 -- -- 95 25 -- --   12/09/22 1930 (!) 152/91 98.4 °F (36.9 °C) Axillary 93 20 93 % --   12/09/22 1630 (!) 157/72 -- -- 92 18 95 % --   12/09/22 1450 -- -- -- -- -- -- 6' 2\" (1.88 m)   12/09/22 1300 (!) 161/78 -- -- 78 17 91 % --   12/09/22 1230 (!) 135/98 -- -- 94 28 93 % --   12/09/22 1211 (!) 163/79 -- -- 92 26 92 % --   12/09/22 1130 (!) 151/73 -- -- 91 22 95 % --   12/09/22 1111 (!) 163/73 -- -- 92 22 93 % --   12/09/22 1109 (!) 156/127 -- -- 97 20 93 % --   12/09/22 0931 (!) 159/76 -- -- 89 24 93 % --   12/09/22 0900 (!) 141/32 -- -- 90 19 90 % --   12/09/22 0830 (!) 160/65 -- -- 90 21 90 % --   12/09/22 0800 (!) 141/76 99.4 °F (37.4 °C) Axillary 90 23 92 % --       Intake / output   12/08 1901 - 12/10 0700  In: 304 [P.O.:100; I.V.:204]  Out: 2950 [Urine:2900]  Physical Exam:  Physical Exam  Vitals and nursing note reviewed. Constitutional:       General: He is not in acute distress. Appearance: He is not diaphoretic. HENT:      Head: Normocephalic and atraumatic. Nose:      Right Sinus: No maxillary sinus tenderness or frontal sinus tenderness. Left Sinus: No maxillary sinus tenderness or frontal sinus tenderness. Mouth/Throat:      Pharynx: No oropharyngeal exudate. Eyes:      General: No scleral icterus. Conjunctiva/sclera: Conjunctivae normal.      Pupils: Pupils are equal, round, and reactive to light. Neck:      Thyroid: No thyromegaly. Vascular: No JVD.    Cardiovascular:      Rate and Rhythm: Normal rate and regular rhythm. Pulses:           Dorsalis pedis pulses are 2+ on the right side and 2+ on the left side. Heart sounds: Normal heart sounds. No murmur heard. Pulmonary:      Effort: Pulmonary effort is normal.      Breath sounds: Normal breath sounds. No wheezing or rales. Abdominal:      Palpations: Abdomen is soft. There is no mass. Tenderness: There is no abdominal tenderness. Musculoskeletal:      Cervical back: Full passive range of motion without pain and neck supple. Lymphadenopathy:      Head:      Right side of head: No submandibular adenopathy. Left side of head: No submandibular adenopathy. Cervical: No cervical adenopathy. Skin:     General: Skin is warm. Neurological:      Mental Status: He is lethargic and disoriented. Motor: No tremor. Psychiatric:         Behavior: Behavior is uncooperative and slowed. Laboratory findings:    Recent Labs     12/08/22  1348 12/09/22  0350 12/09/22  1330 12/09/22  1823 12/10/22  0255   WBC 6.2 7.4  --   --  9.9   HGB 9.5* 7.9* 9.2* 9.1* 8.8*   HCT 30.3* 25.6* 29.5* 29.1* 27.1*    297  --   --  378   INR 1.1  --   --   --   --        Recent Labs     12/08/22  0441 12/08/22  1348 12/08/22  2307 12/09/22  0350    140  --  146*   K 3.5* 3.5* 3.5* 3.6*    103  --  107   CO2 25 23  --  22   GLUCOSE 298* 233*  --  333*   BUN 12 13  --  14   CREATININE 1.13 1.14  --  1.39*   MG 1.9 1.9  --   --    CALCIUM 8.0* 8.3*  --  8.2*       Recent Labs     12/08/22  1348   *            RBC, UA   Date Value Ref Range Status   11/29/2022 2 TO 5 0 - 4 /HPF Final     Comment:     Reference range defined for non-centrifuged specimen. WBC, UA   Date Value Ref Range Status   11/29/2022 20 TO 50 0 - 5 /HPF Final       Imaging / Clinical Data :-   CT HEAD WO CONTRAST    Result Date: 12/2/2022  No acute intracranial abnormality. XR CHEST PORTABLE    Result Date: 12/8/2022  Removal of the endotracheal and gastric tubes. Improved bibasilar airspace opacities. XR CHEST PORTABLE    Result Date: 12/4/2022  Increased left basilar atelectasis or pneumonia. Mild central vascular congestion. XR CHEST PORTABLE    Result Date: 12/2/2022  1. Endotracheal tube in expected position. 2. Bibasilar pulmonary opacities, atelectasis versus pneumonia. XR ABDOMEN FOR NG/OG/NE TUBE PLACEMENT    Result Date: 12/2/2022  Enteric tube in expected position. Clinical Course : gradually improving  Assessment and Plan  :        Upper GI bleed due to duodenal ulcer and duodenitis -underwent EGD with epinephrine injection into the ulcer. Treated with Protonix infusion currently on twice daily dose. Changed to oral.  CAD s/p CABG on 10/25/2022 -continue aspirin. Monitor for GI bleed. Paroxysmal atrial fibrillation -Coumadin on hold due to duodenal ulcer. Continue amiodarone. Outpatient follow-up with GI for clearance for anticoagulation. Patient and his wife at bedside updated about limitation on using anticoagulation and risk of stroke from atrial fibrillation. Essential hypertension -low-dose beta-blocker with parameters  Type 2 diabetes mellitus-continue Lantus 20 inch daily. Change insulin Humalog to high correction scale. Hypovolemic shock -blood pressure stable now after fluid resuscitation and pressor support. Urinary retention -on Flomax. Anemia due to acute blood loss -iron supplement. Delirium -due to prolonged hospitalization and medications. Avoid sedatives, benzodiazepines. Discussed with patient's wife at bedside. Continue to monitor vitals , Intake / output ,  Cell count , HGB , Kidney function, oxygenation  as indicated . Plan and updates discussed with patient ,  answers  explained to satisfaction.    Plan discussed with Staff Se Roland RN     (Please note that portions of this note were completed with a voice recognition program. Efforts were made to edit the dictations but occasionally words are mis-transcribed.)      Celena Maldonado MD  12/10/2022

## 2022-12-10 NOTE — PLAN OF CARE
S[facility] Corporal Black  Outcome: Progressing  Goal: Maintains or returns to baseline bowel function  12/9/2022 1738 by Deidra Sauer  Outcome: Progressing  Goal: Maintains adequate nutritional intake  12/9/2022 1738 by Deidra Sauer  Outcome: Progressing  Goal: Establish and maintain optimal ostomy function  12/9/2022 1738 by Deidra Sauer  Outcome: Progressing     Problem: Respiratory - Adult  Goal: Achieves optimal ventilation and oxygenation  12/9/2022 1738 by Deidra Sauer  Outcome: Progressing     Problem: Nutrition Deficit:  Goal: Optimize nutritional status  12/10/2022 0209 by Kaveh Clinton, RN  Outcome: Progressing  12/9/2022 1738 by Deidra Sauer  Outcome: Progressing  12/9/2022 1504 by Helio Krueger, FLORENCIA, LD  Flowsheets (Taken 12/9/2022 1450)  Nutrient intake appropriate for improving, restoring, or maintaining nutritional needs:   Assess nutritional status and recommend course of action   Monitor oral intake, labs, and treatment plans   Recommend appropriate diets, oral nutritional supplements, and vitamin/mineral supplements     Problem: Skin/Tissue Integrity  Goal: Absence of new skin breakdown  Description: 1. Monitor for areas of redness and/or skin breakdown  2. Assess vascular access sites hourly  3. Every 4-6 hours minimum:  Change oxygen saturation probe site  4. Every 4-6 hours:  If on nasal continuous positive airway pressure, respiratory therapy assess nares and determine need for appliance change or resting period.   12/10/2022 0209 by Kaveh Clinton, RN  Outcome: Progressing  12/9/2022 1738 by Deidra Sauer  Outcome: Progressing     Problem: Safety - Adult  Goal: Free from fall injury  12/9/2022 1738 by Deidra Sauer  Outcome: Progressing     Problem: ABCDS Injury Assessment  Goal: Absence of physical injury  12/9/2022 1738 by Deidra Sauer  Outcome: Progressing     Problem: Neurosensory - Adult  Goal: Achieves stable or improved neurological status  12/10/2022 0209 by Kaveh Clinton, RN  Outcome: Progressing  12/9/2022 1738 by Darrick Nino  Outcome: Progressing  Goal: Absence of seizures  12/9/2022 1738 by Darrick Nino  Outcome: Progressing  Goal: Remains free of injury related to seizures activity  12/9/2022 1738 by Darrick Nino  Outcome: Progressing  Goal: Achieves maximal functionality and self care  12/9/2022 1738 by Darrick Nino  Outcome: Progressing     Problem: Skin/Tissue Integrity - Adult  Goal: Skin integrity remains intact  12/9/2022 1738 by Darrick Nino  Outcome: Progressing  Goal: Incisions, wounds, or drain sites healing without S/S of infection  12/10/2022 0209 by Jessa Lundberg RN  Outcome: Progressing  12/9/2022 1738 by Darrick Nino  Outcome: Progressing  Goal: Oral mucous membranes remain intact  12/9/2022 1738 by Darrick Nino  Outcome: Progressing     Problem: Safety - Medical Restraint  Goal: Remains free of injury from restraints (Restraint for Interference with Medical Device)  Description: INTERVENTIONS:  1. Determine that other, less restrictive measures have been tried or would not be effective before applying the restraint  2. Evaluate the patient's condition at the time of restraint application  3. Inform patient/family regarding the reason for restraint  4.  Q2H: Monitor safety, psychosocial status, comfort, nutrition and hydration  12/10/2022 0209 by Jessa Lundberg RN  Outcome: Progressing  12/9/2022 1738 by Darrick Nino  Outcome: Progressing  Flowsheets  Taken 12/9/2022 1400 by Mega Ora free of injury from restraints (restraint for interference with medical device): Every 2 hours: Monitor safety, psychosocial status, comfort, nutrition and hydration  Taken 12/9/2022 1200 by Mega Ora free of injury from restraints (restraint for interference with medical device): Every 2 hours: Monitor safety, psychosocial status, comfort, nutrition and hydration  Taken 12/9/2022 1000 by Mega Ora free of injury from restraints (restraint for interference with medical device): Every 2 hours: Monitor safety, psychosocial status, comfort, nutrition and hydration  Taken 12/9/2022 0800 by Bobbi Davidson free of injury from restraints (restraint for interference with medical device): Every 2 hours: Monitor safety, psychosocial status, comfort, nutrition and hydration  Taken 12/9/2022 0600 by Jayme Roland RN  Remains free of injury from restraints (restraint for interference with medical device): Every 2 hours: Monitor safety, psychosocial status, comfort, nutrition and hydration     Problem: Chronic Conditions and Co-morbidities  Goal: Patient's chronic conditions and co-morbidity symptoms are monitored and maintained or improved  12/9/2022 1738 by Michelle Kothari  Outcome: Progressing

## 2022-12-10 NOTE — PLAN OF CARE
Problem: Discharge Planning  Goal: Discharge to home or other facility with appropriate resources  Outcome: Progressing     Problem: Confusion  Goal: Confusion, delirium, dementia, or psychosis is improved or at baseline  Description: INTERVENTIONS:  1. Assess for possible contributors to thought disturbance, including medications, impaired vision or hearing, underlying metabolic abnormalities, dehydration, psychiatric diagnoses, and notify attending LIP  2. [place] high risk fall precautions, as indicated  3. Provide frequent short contacts to provide reality reorientation, refocusing and direction  4. Decrease environmental stimuli, including noise as appropriate  5. Monitor and intervene to maintain adequate nutrition, hydration, elimination, sleep and activity  6. If unable to ensure safety without constant attention obtain sitter and review sitter guidelines with assigned personnel  7. Initiate Psychosocial CNS and Spiritual Care consult, as indicated  Outcome: Progressing     Problem: Pain  Goal: Verbalizes/displays adequate comfort level or baseline comfort level  Outcome: Progressing     Problem: Cardiovascular - Adult  Goal: Maintains optimal cardiac output and hemodynamic stability  Outcome: Progressing  Goal: Absence of cardiac dysrhythmias or at baseline  Outcome: Progressing     Problem: Gastrointestinal - Adult  Goal: Minimal or absence of nausea and vomiting  Outcome: Progressing  Goal: Maintains or returns to baseline bowel function  Outcome: Progressing  Goal: Maintains adequate nutritional intake  Outcome: Progressing  Goal: Establish and maintain optimal ostomy function  Outcome: Progressing     Problem: Respiratory - Adult  Goal: Achieves optimal ventilation and oxygenation  Outcome: Progressing     Problem: Nutrition Deficit:  Goal: Optimize nutritional status  Outcome: Progressing     Problem: Skin/Tissue Integrity  Goal: Absence of new skin breakdown  Description: 1.   Monitor for areas of redness and/or skin breakdown  2. Assess vascular access sites hourly  3. Every 4-6 hours minimum:  Change oxygen saturation probe site  4. Every 4-6 hours:  If on nasal continuous positive airway pressure, respiratory therapy assess nares and determine need for appliance change or resting period. Outcome: Progressing     Problem: Safety - Adult  Goal: Free from fall injury  Outcome: Progressing     Problem: ABCDS Injury Assessment  Goal: Absence of physical injury  Outcome: Progressing     Problem: Neurosensory - Adult  Goal: Achieves stable or improved neurological status  Outcome: Progressing  Goal: Absence of seizures  Outcome: Progressing  Goal: Remains free of injury related to seizures activity  Outcome: Progressing  Goal: Achieves maximal functionality and self care  Outcome: Progressing     Problem: Skin/Tissue Integrity - Adult  Goal: Skin integrity remains intact  Outcome: Progressing  Goal: Incisions, wounds, or drain sites healing without S/S of infection  Outcome: Progressing  Goal: Oral mucous membranes remain intact  Outcome: Progressing     Problem: Safety - Medical Restraint  Goal: Remains free of injury from restraints (Restraint for Interference with Medical Device)  Description: INTERVENTIONS:  1. Determine that other, less restrictive measures have been tried or would not be effective before applying the restraint  2. Evaluate the patient's condition at the time of restraint application  3. Inform patient/family regarding the reason for restraint  4.  Q2H: Monitor safety, psychosocial status, comfort, nutrition and hydration  Outcome: Progressing     Problem: Chronic Conditions and Co-morbidities  Goal: Patient's chronic conditions and co-morbidity symptoms are monitored and maintained or improved  Outcome: Progressing

## 2022-12-11 ENCOUNTER — APPOINTMENT (OUTPATIENT)
Dept: GENERAL RADIOLOGY | Age: 77
DRG: 377 | End: 2022-12-11
Attending: INTERNAL MEDICINE
Payer: MEDICARE

## 2022-12-11 LAB
GLUCOSE BLD-MCNC: 168 MG/DL (ref 75–110)
GLUCOSE BLD-MCNC: 187 MG/DL (ref 75–110)
GLUCOSE BLD-MCNC: 203 MG/DL (ref 75–110)
GLUCOSE BLD-MCNC: 259 MG/DL (ref 75–110)
GLUCOSE BLD-MCNC: 269 MG/DL (ref 75–110)
HCT VFR BLD CALC: 26.4 % (ref 40.7–50.3)
HCT VFR BLD CALC: 29.1 % (ref 40.7–50.3)
HEMOGLOBIN: 8.4 G/DL (ref 13–17)
HEMOGLOBIN: 8.5 G/DL (ref 13–17)
MCH RBC QN AUTO: 28.9 PG (ref 25.2–33.5)
MCHC RBC AUTO-ENTMCNC: 31.8 G/DL (ref 28.4–34.8)
MCV RBC AUTO: 90.7 FL (ref 82.6–102.9)
NRBC AUTOMATED: 0 PER 100 WBC
PDW BLD-RTO: 15.9 % (ref 11.8–14.4)
PLATELET # BLD: 376 K/UL (ref 138–453)
PMV BLD AUTO: 10.1 FL (ref 8.1–13.5)
RBC # BLD: 2.91 M/UL (ref 4.21–5.77)
WBC # BLD: 11.5 K/UL (ref 3.5–11.3)

## 2022-12-11 PROCEDURE — 6370000000 HC RX 637 (ALT 250 FOR IP): Performed by: INTERNAL MEDICINE

## 2022-12-11 PROCEDURE — 74230 X-RAY XM SWLNG FUNCJ C+: CPT

## 2022-12-11 PROCEDURE — 6370000000 HC RX 637 (ALT 250 FOR IP): Performed by: FAMILY MEDICINE

## 2022-12-11 PROCEDURE — 6360000002 HC RX W HCPCS

## 2022-12-11 PROCEDURE — 97116 GAIT TRAINING THERAPY: CPT

## 2022-12-11 PROCEDURE — 85027 COMPLETE CBC AUTOMATED: CPT

## 2022-12-11 PROCEDURE — 97530 THERAPEUTIC ACTIVITIES: CPT

## 2022-12-11 PROCEDURE — 82947 ASSAY GLUCOSE BLOOD QUANT: CPT

## 2022-12-11 PROCEDURE — 2580000003 HC RX 258: Performed by: INTERNAL MEDICINE

## 2022-12-11 PROCEDURE — 6360000002 HC RX W HCPCS: Performed by: FAMILY MEDICINE

## 2022-12-11 PROCEDURE — 36415 COLL VENOUS BLD VENIPUNCTURE: CPT

## 2022-12-11 PROCEDURE — 85014 HEMATOCRIT: CPT

## 2022-12-11 PROCEDURE — 2500000003 HC RX 250 WO HCPCS: Performed by: INTERNAL MEDICINE

## 2022-12-11 PROCEDURE — 99232 SBSQ HOSP IP/OBS MODERATE 35: CPT | Performed by: FAMILY MEDICINE

## 2022-12-11 PROCEDURE — 97535 SELF CARE MNGMENT TRAINING: CPT

## 2022-12-11 PROCEDURE — 2580000003 HC RX 258: Performed by: STUDENT IN AN ORGANIZED HEALTH CARE EDUCATION/TRAINING PROGRAM

## 2022-12-11 PROCEDURE — 85018 HEMOGLOBIN: CPT

## 2022-12-11 PROCEDURE — 92611 MOTION FLUOROSCOPY/SWALLOW: CPT

## 2022-12-11 PROCEDURE — 6360000002 HC RX W HCPCS: Performed by: STUDENT IN AN ORGANIZED HEALTH CARE EDUCATION/TRAINING PROGRAM

## 2022-12-11 PROCEDURE — 2060000000 HC ICU INTERMEDIATE R&B

## 2022-12-11 PROCEDURE — 6370000000 HC RX 637 (ALT 250 FOR IP): Performed by: NURSE PRACTITIONER

## 2022-12-11 RX ORDER — PROMETHAZINE HYDROCHLORIDE 25 MG/ML
25 INJECTION, SOLUTION INTRAMUSCULAR; INTRAVENOUS ONCE
Status: COMPLETED | OUTPATIENT
Start: 2022-12-11 | End: 2022-12-11

## 2022-12-11 RX ORDER — METOCLOPRAMIDE HYDROCHLORIDE 5 MG/ML
10 INJECTION INTRAMUSCULAR; INTRAVENOUS EVERY 6 HOURS PRN
Status: DISCONTINUED | OUTPATIENT
Start: 2022-12-12 | End: 2022-12-14 | Stop reason: HOSPADM

## 2022-12-11 RX ORDER — FAMOTIDINE 20 MG/1
20 TABLET, FILM COATED ORAL 2 TIMES DAILY
Status: DISCONTINUED | OUTPATIENT
Start: 2022-12-11 | End: 2022-12-14 | Stop reason: HOSPADM

## 2022-12-11 RX ADMIN — INSULIN LISPRO 8 UNITS: 100 INJECTION, SOLUTION INTRAVENOUS; SUBCUTANEOUS at 19:13

## 2022-12-11 RX ADMIN — METOPROLOL TARTRATE 25 MG: 25 TABLET ORAL at 21:18

## 2022-12-11 RX ADMIN — HEPARIN SODIUM 5000 UNITS: 5000 INJECTION INTRAVENOUS; SUBCUTANEOUS at 21:36

## 2022-12-11 RX ADMIN — SODIUM CHLORIDE, PRESERVATIVE FREE 10 ML: 5 INJECTION INTRAVENOUS at 21:24

## 2022-12-11 RX ADMIN — SUCRALFATE 1 G: 1 TABLET ORAL at 06:27

## 2022-12-11 RX ADMIN — ONDANSETRON 4 MG: 2 INJECTION INTRAMUSCULAR; INTRAVENOUS at 06:34

## 2022-12-11 RX ADMIN — SODIUM CHLORIDE, PRESERVATIVE FREE 10 ML: 5 INJECTION INTRAVENOUS at 09:46

## 2022-12-11 RX ADMIN — AMIODARONE HYDROCHLORIDE 200 MG: 200 TABLET ORAL at 09:45

## 2022-12-11 RX ADMIN — SUCRALFATE 1 G: 1 TABLET ORAL at 09:46

## 2022-12-11 RX ADMIN — INSULIN GLARGINE 20 UNITS: 100 INJECTION, SOLUTION SUBCUTANEOUS at 10:01

## 2022-12-11 RX ADMIN — EZETIMIBE 10 MG: 10 TABLET ORAL at 21:19

## 2022-12-11 RX ADMIN — FAMOTIDINE 20 MG: 20 TABLET, FILM COATED ORAL at 13:32

## 2022-12-11 RX ADMIN — ONDANSETRON 4 MG: 2 INJECTION INTRAMUSCULAR; INTRAVENOUS at 18:51

## 2022-12-11 RX ADMIN — POLYMYXIN B SULFATE, BACITRACIN ZINC, NEOMYCIN SULFATE: 5000; 3.5; 4 OINTMENT TOPICAL at 09:46

## 2022-12-11 RX ADMIN — SUCRALFATE 1 G: 1 TABLET ORAL at 21:23

## 2022-12-11 RX ADMIN — ASPIRIN 81 MG: 81 TABLET, CHEWABLE ORAL at 09:45

## 2022-12-11 RX ADMIN — METOPROLOL TARTRATE 25 MG: 25 TABLET ORAL at 09:45

## 2022-12-11 RX ADMIN — SODIUM CHLORIDE, PRESERVATIVE FREE 20 MG: 5 INJECTION INTRAVENOUS at 09:45

## 2022-12-11 RX ADMIN — PANTOPRAZOLE SODIUM 40 MG: 40 TABLET, DELAYED RELEASE ORAL at 05:49

## 2022-12-11 RX ADMIN — FERROUS SULFATE TAB EC 325 MG (65 MG FE EQUIVALENT) 325 MG: 325 (65 FE) TABLET DELAYED RESPONSE at 09:45

## 2022-12-11 RX ADMIN — PROMETHAZINE HYDROCHLORIDE 25 MG: 25 INJECTION INTRAMUSCULAR; INTRAVENOUS at 19:22

## 2022-12-11 RX ADMIN — PROCHLORPERAZINE EDISYLATE 10 MG: 5 INJECTION INTRAMUSCULAR; INTRAVENOUS at 15:14

## 2022-12-11 RX ADMIN — INSULIN GLARGINE 20 UNITS: 100 INJECTION, SOLUTION SUBCUTANEOUS at 21:30

## 2022-12-11 RX ADMIN — HEPARIN SODIUM 5000 UNITS: 5000 INJECTION INTRAVENOUS; SUBCUTANEOUS at 13:32

## 2022-12-11 RX ADMIN — TAMSULOSIN HYDROCHLORIDE 0.4 MG: 0.4 CAPSULE ORAL at 09:45

## 2022-12-11 RX ADMIN — FAMOTIDINE 20 MG: 20 TABLET, FILM COATED ORAL at 21:18

## 2022-12-11 RX ADMIN — ESCITALOPRAM OXALATE 10 MG: 10 TABLET ORAL at 09:45

## 2022-12-11 RX ADMIN — POLYMYXIN B SULFATE, BACITRACIN ZINC, NEOMYCIN SULFATE: 5000; 3.5; 4 OINTMENT TOPICAL at 21:19

## 2022-12-11 ASSESSMENT — ENCOUNTER SYMPTOMS
SINUS PRESSURE: 0
DIARRHEA: 0
CHOKING: 0
SHORTNESS OF BREATH: 1
SORE THROAT: 0
CHEST TIGHTNESS: 0
WHEEZING: 0
VOICE CHANGE: 0
NAUSEA: 0
ABDOMINAL PAIN: 0
TROUBLE SWALLOWING: 1
SHORTNESS OF BREATH: 0
VOMITING: 0
BLOOD IN STOOL: 0
BACK PAIN: 0
COUGH: 0
RHINORRHEA: 0
CONSTIPATION: 0

## 2022-12-11 ASSESSMENT — PAIN SCALES - GENERAL
PAINLEVEL_OUTOF10: 0

## 2022-12-11 NOTE — PROGRESS NOTES
PATIENT REFUSES TO WEAR BIPAP     [x] Risks and benefits explained to patient   [x] Patient refuses to wear Bipap stating \"I don't need it\"  [x] Patient verbalizes understanding of information presented. Writer attempted to place pt on BiPAP, but he Is refusing at this time. Pt remains on 4L NC sat 96%. Will continue to monitor and attempt BiPAP placement again later.

## 2022-12-11 NOTE — CARE COORDINATION
Transitional Planning  Unable to speak with patient regarding transitional plan, multiple Mds in room. Will attempt to see patient as time always. 1234 pm PS Dr. Bernardino Dior for PM&R consult.

## 2022-12-11 NOTE — PROGRESS NOTES
Physical Therapy  Facility/Department: [address] NEURO  Physical Therapy Daily Treatment Note    Name: Raad Nolan  : 1945  MRN: [id number]  Date of Service: 2022    Discharge Recommendations:  Further therapy recommended at discharge. The patient should be able to tolerate at least 3 hours of therapy per day over 5 days or 15 hours over 7 days. This patient may benefit from a Physical Medicine and Rehab consult. PT Equipment Recommendations  Equipment Needed: No  Other: pt currently requires RW and significant physical assistance for safety      Patient Diagnosis(es): There were no encounter diagnoses. Past Medical History:  has a past medical history of A-fib ([facility] [address] CAD (coronary artery disease), Depression, DM2 (diabetes mellitus, type 2) ([facility] [address] and HTN (hypertension). Past Surgical History:  has a past surgical history that includes Upper gastrointestinal endoscopy (N/A, 2022); Coronary artery bypass graft; Carotid endarterectomy (Right); Carpal tunnel release; Cholecystectomy; Cardiac catheterization; and Colonoscopy. Assessment   Body Structures, Functions, Activity Limitations Requiring Skilled Therapeutic Intervention: Decreased functional mobility ; Decreased endurance;Decreased posture;Decreased balance;Decreased ROM; Decreased safe awareness;Decreased strength;Decreased coordination  Assessment: Pt required modA for bed mobility and STS transfer, ambulated 40ft x3 with modA and close WC follow. Pt is a high fall risk and will require aggressive continued skilled physical therapy to address functional mobility deficits to return pt to prior level of independence.   Therapy Prognosis: Good  Requires PT Follow-Up: Yes  Activity Tolerance  Activity Tolerance: Patient limited by endurance;Treatment limited secondary to medical complications  Activity Tolerance Comments: Limited due to nausea     Plan   Physcial Therapy Plan  General Plan: 6-7 times per week  Current Treatment Recommendations: Strengthening, ROM, Balance training, Gait training, Functional mobility training, Transfer training, Stair training, Home exercise program, Safety education & training, Patient/Caregiver education & training, Therapeutic activities, Equipment evaluation, education, & procurement  Additional Comments: Pt with decreased tolerance downward tranding Hgb with transfusion pending  Safety Devices  Type of Devices: Gait belt, Call light within reach, Nurse notified, Patient at risk for falls, Left in chair  Restraints  Restraints Initially in Place: No     Restrictions  Restrictions/Precautions  Restrictions/Precautions: Up as Tolerated  Required Braces or Orthoses?: No     Subjective   Pain: pt denies pain, nauseous t/o  General  Chart Reviewed: Yes  Patient assessed for rehabilitation services?: Yes  Response To Previous Treatment: Patient with no complaints from previous session. Family / Caregiver Present: Yes (wife)  Follows Commands: Within Functional Limits  General Comment  Comments: Pt left seated in recliner with call light within reach, alarm activated and wife present in room  Subjective  Subjective: RN and pt in agreement PT. Pt supine in bed upon PT arrival, pt on 3L NC, pt intermittently confused however cooperative throughout session. Very motivated to participate       Cognition   Orientation  Overall Orientation Status: Impaired  Orientation Level: Oriented to situation;Oriented to person;Oriented to time;Oriented to place (oriented x4, intermittent confusion)  Cognition  Overall Cognitive Status: Exceptions  Arousal/Alertness: Appropriate responses to stimuli  Following Commands: Follows multistep commands with repitition; Follows multistep commands with increased time  Attention Span: Attends with cues to redirect  Memory: Decreased recall of biographical Information;Decreased long term memory  Safety Judgement: Decreased awareness of need for assistance;Decreased awareness of need for safety  Problem Solving: Assistance required to generate solutions;Assistance required to correct errors made  Insights: Decreased awareness of deficits  Initiation: Requires cues for some  Sequencing: Requires cues for some  Cognition Comment: Pt pleasent and cooperative, able to make needs known, spouse reports hospital delirium and confusion. Objective   SpO2: 96 %  O2 Device: Nasal cannula     Observation/Palpation  Posture: Fair     Bed mobility  Supine to Sit: Moderate assistance  Sit to Supine:  (pt left seated in recliner)  Scooting: Minimal assistance  Bed Mobility Comments: Increased time to complete with VCs for sequencing  Transfers  Sit to Stand: Moderate Assistance  Stand to Sit: Moderate Assistance  Comment: STS performed x3 with RW; pt demonstrating posterior lean with transfers  Pt reports intermittent dizziness but does not limit treatment  Ambulation  Surface: Level tile  Device: Rolling Walker  Other Apparatus: Wheelchair follow  Assistance: Moderate assistance  Quality of Gait: high reliance on RW  Gait Deviations: Slow Dede; Shuffles;Decreased step length  Distance: 40ft x3  Comments: Pt with significant unsteadiness while ambulating with LOB posteriorly and to the right, close WC follow for safety. Pt with reports of nausea following ambulation, RN notified and aware.   More Ambulation?: No  Stairs/Curb  Stairs?: No     Balance  Posture: Fair  Sitting - Static: Fair;+  Sitting - Dynamic: Fair  Standing - Static: Poor;+  Standing - Dynamic: Poor  Comments: standing balance assessed w/ RW; pt required CGA to sit EOB  Exercise Treatment: ther exs deferred d/t nausea    AM-PAC Score  AM-PAC Inpatient Mobility Raw Score : 12 (12/11/22 1615)  AM-PAC Inpatient T-Scale Score : 35.33 (12/11/22 1615)  Mobility Inpatient CMS 0-100% Score: 68.66 (12/11/22 1615)  Mobility Inpatient CMS G-Code Modifier : CL (12/11/22 1615)     Goals  Short Term Goals  Time Frame for Short Term Goals: 14  Short Term Goal 1: Pt to perform bed mobility with supervision  Short Term Goal 2: Pt to demonstrate functional transfers CGA  Short Term Goal 3: Pt to ambulate 150ft w/ RW Otilia  Short Term Goal 4: Tolerate 30 minutes of therapy to demo increased endurance  Patient Goals   Patient Goals : To get stronger       Education  Patient Education  Education Given To: Patient  Education Provided: Role of Therapy;Plan of Care;Equipment; Fall Prevention Strategies;Transfer Training;Energy Conservation  Education Method: Demonstration;Verbal  Barriers to Learning: Cognition  Education Outcome: Verbalized understanding;Demonstrated understanding;Continued education needed      Therapy Time   Individual Concurrent Group Co-treatment   Time In 6130         Time Out 1500         Minutes 40         Timed Code Treatment Minutes: [address] [place], [place]

## 2022-12-11 NOTE — PLAN OF CARE
Problem: Discharge Planning  Goal: Discharge to home or other facility with appropriate resources  12/11/2022 0454 by Jose Juan Vela RN  Outcome: Progressing  12/10/2022 1751 by Diana Dent  Outcome: Progressing     Problem: Confusion  Goal: Confusion, delirium, dementia, or psychosis is improved or at baseline  Description: INTERVENTIONS:  1. Assess for possible contributors to thought disturbance, including medications, impaired vision or hearing, underlying metabolic abnormalities, dehydration, psychiatric diagnoses, and notify attending LIP  2. Boss high risk fall precautions, as indicated  3. Provide frequent short contacts to provide reality reorientation, refocusing and direction  4. Decrease environmental stimuli, including noise as appropriate  5. Monitor and intervene to maintain adequate nutrition, hydration, elimination, sleep and activity  6. If unable to ensure safety without constant attention obtain sitter and review sitter guidelines with assigned personnel  7.  Initiate Psychosocial CNS and Spiritual Care consult, as indicated  12/11/2022 0454 by Jose Juan Vela RN  Outcome: Progressing  12/10/2022 1751 by Diana Dent  Outcome: Progressing     Problem: Pain  Goal: Verbalizes/displays adequate comfort level or baseline comfort level  12/11/2022 0454 by Jose Juan Vela RN  Outcome: Progressing  12/10/2022 1751 by Diana Dent  Outcome: Progressing     Problem: Cardiovascular - Adult  Goal: Maintains optimal cardiac output and hemodynamic stability  12/11/2022 0454 by Jose Juan Vela RN  Outcome: Progressing  12/10/2022 1751 by Diana Dent  Outcome: Progressing  Goal: Absence of cardiac dysrhythmias or at baseline  12/11/2022 0454 by Jose Juan Vela RN  Outcome: Progressing  12/10/2022 1751 by Diana Dent  Outcome: Progressing     Problem: Gastrointestinal - Adult  Goal: Minimal or absence of nausea and vomiting  12/11/2022 0454 by Jose Juan Vela RN  Outcome: Progressing  12/10/2022 1751 by Pravin Gonzalez  Outcome: Progressing  Goal: Maintains or returns to baseline bowel function  12/11/2022 0454 by Neri Connolly RN  Outcome: Progressing  12/10/2022 1751 by Pravin Gonzalez  Outcome: Progressing  Goal: Maintains adequate nutritional intake  12/11/2022 0454 by Neri Connolly RN  Outcome: Progressing  12/10/2022 1751 by Pravin Gonzalez  Outcome: Progressing  Goal: Establish and maintain optimal ostomy function  12/11/2022 0454 by Neri Connolly RN  Outcome: Progressing  12/10/2022 1751 by Pravin Gonzalez  Outcome: Progressing     Problem: Respiratory - Adult  Goal: Achieves optimal ventilation and oxygenation  12/11/2022 0454 by Neri Connolly RN  Outcome: Progressing  12/10/2022 1751 by Pravin Gonzalez  Outcome: Progressing     Problem: Nutrition Deficit:  Goal: Optimize nutritional status  12/11/2022 0454 by Neri Connolly RN  Outcome: Progressing  12/10/2022 1751 by Pravin Gonzalez  Outcome: Progressing     Problem: Skin/Tissue Integrity  Goal: Absence of new skin breakdown  Description: 1. Monitor for areas of redness and/or skin breakdown  2. Assess vascular access sites hourly  3. Every 4-6 hours minimum:  Change oxygen saturation probe site  4. Every 4-6 hours:  If on nasal continuous positive airway pressure, respiratory therapy assess nares and determine need for appliance change or resting period.   12/11/2022 0454 by Neri Connolly RN  Outcome: Progressing  12/10/2022 1751 by Pravin Gonzalez  Outcome: Progressing     Problem: Safety - Adult  Goal: Free from fall injury  12/11/2022 0454 by Neri Connolly RN  Outcome: Progressing  12/10/2022 1751 by Pravin Gonzalez  Outcome: Progressing     Problem: ABCDS Injury Assessment  Goal: Absence of physical injury  12/11/2022 0454 by Neri Connolly RN  Outcome: Progressing  12/10/2022 1751 by Pravin Gonzalez  Outcome: Progressing     Problem: Neurosensory - Adult  Goal: Achieves stable or improved neurological status  12/11/2022 0454 by Neri Connolly RN  Outcome: Progressing  12/10/2022 1751 by Luz Alejandro  Outcome: Progressing  Goal: Absence of seizures  12/11/2022 0454 by Betty Ryan RN  Outcome: Progressing  12/10/2022 1751 by Luz Alejandro  Outcome: Progressing  Goal: Remains free of injury related to seizures activity  12/11/2022 0454 by Betty Ryan RN  Outcome: Progressing  12/10/2022 1751 by Luz Alejandro  Outcome: Progressing  Goal: Achieves maximal functionality and self care  12/11/2022 0454 by Betty Ryan RN  Outcome: Progressing  12/10/2022 1751 by Luz Alejandro  Outcome: Progressing     Problem: Skin/Tissue Integrity - Adult  Goal: Skin integrity remains intact  12/11/2022 0454 by Betty Ryan RN  Outcome: Progressing  12/10/2022 1751 by Luz Alejandro  Outcome: Progressing  Goal: Incisions, wounds, or drain sites healing without S/S of infection  12/11/2022 0454 by Betty Ryan RN  Outcome: Progressing  12/10/2022 1751 by Luz Alejandro  Outcome: Progressing  Goal: Oral mucous membranes remain intact  12/11/2022 0454 by Betty Ryan RN  Outcome: Progressing  12/10/2022 1751 by Luz Alejandro  Outcome: Progressing     Problem: Chronic Conditions and Co-morbidities  Goal: Patient's chronic conditions and co-morbidity symptoms are monitored and maintained or improved  12/11/2022 0454 by Betty Ryan RN  Outcome: Progressing  12/10/2022 1751 by Luz Alejandro  Outcome: Progressing

## 2022-12-11 NOTE — PROGRESS NOTES
Carrillo catheter removed per Dr. Arcadio Garzon order. Patient given urinal accessible at bedside as well as brief intact. RN will continue to monitor patient's output & encourage fluids.

## 2022-12-11 NOTE — PROGRESS NOTES
Occupational Therapy  Facility/Department: [address] NEURO  Occupational Therapy Daily Treatment Note    Name: Lady Langley  : 1945  MRN: [id number]  Date of Service: 2022    Discharge Recommendations:  Further therapy recommended at discharge. The patient should be able to tolerate at least 3 hours of therapy per day over 5 days or 15 hours over 7 days. This patient may benefit from a Physical Medicine and Rehab consult. Assessment   Performance deficits / Impairments: Decreased functional mobility ; Decreased ADL status; Decreased endurance;Decreased high-level IADLs;Decreased safe awareness;Decreased balance;Decreased cognition;Decreased strength  Prognosis: Good  REQUIRES OT FOLLOW-UP: Yes  Activity Tolerance  Activity Tolerance: Patient Tolerated treatment well;Patient limited by fatigue        Plan   Occupational Therapy Plan  Times Per Week: 4-5x/wk  Current Treatment Recommendations: Strengthening, Balance training, Functional mobility training, Endurance training, Equipment evaluation, education, & procurement, Patient/Caregiver education & training, Home management training, Positioning, Safety education & training     Restrictions  Restrictions/Precautions  Restrictions/Precautions: Up as Tolerated  Required Braces or Orthoses?: No    Subjective   General  Chart Reviewed: Yes  General Comment  Comments: Pt and RN agreeable to therapy this day. Pt supine in bed at start of session and retired to seated in chair at session end with call light in reach, chair alarm on and all needs met. Pt denied pain c/o naseous RN notified    Objective       Safety Devices  Type of Devices: Gait belt;Left in chair;Chair alarm in place;Call light within reach; Patient at risk for falls;Nurse notified  Restraints  Restraints Initially in Place: No  Balance  Sitting: With support (Min A statitic sitting at EOB increasing to CGA tolerating approx 10 min)  Standing: With support (Pt tolerated approx 5-6 min total with Min-Mod A utilizing RW demo post lean. Multiple standing rest breaks during mobility)  Gait  Overall Level of Assistance: Moderate assistance (simulated household distances utilizing RW with close chair follow required approx 4-5 standing rest breaks and 1 seated rest break demo post lean)        ADL  Grooming: Modified independent ;Setup  Grooming Skilled Clinical Factors: Face washing facilitated seated in chair  UE Dressing: Minimal assistance;Setup;Verbal cueing; Increased time to complete  UE Dressing Skilled Clinical Factors: To don gown seated at EOB as simulated jacket req assist to thread LUE  Additional Comments: Throughout session pt limited per decreased cognition, decreased strength and naseous     Activity Tolerance  Activity Tolerance: Patient limited by endurance;Treatment limited secondary to medical complications  Activity Tolerance Comments: Limited due to nausea  Bed mobility  Supine to Sit: Minimal assistance (assist with trunk)  Scooting: Minimal assistance  Bed Mobility Comments: HOB minimally elevated, utilizing bed rails, impulsive  Transfers  Sit to stand: Moderate assistance  Stand to sit: Moderate assistance  Transfer Comments: utilizing RW     Cognition  Overall Cognitive Status: Exceptions  Arousal/Alertness: Appropriate responses to stimuli  Following Commands: Follows multistep commands with repitition; Follows multistep commands with increased time  Attention Span: Attends with cues to redirect  Memory: Decreased recall of biographical Information;Decreased long term memory  Safety Judgement: Decreased awareness of need for assistance;Decreased awareness of need for safety  Problem Solving: Assistance required to correct errors made;Decreased awareness of errors;Assistance required to identify errors made  Insights: Decreased awareness of deficits  Initiation: Requires cues for some  Sequencing: Requires cues for some  Cognition Comment: spouse reports hospital delirium and confusion  Orientation  Overall Orientation Status: Impaired  Orientation Level: Oriented to situation;Oriented to person;Oriented to time;Oriented to place (oriented x4, intermittent confusion)                  Education Given To: Patient  Education Provided: Role of Therapy;Transfer Training;Precautions  Education Provided Comments: proper hand and foot placement; balance maintaince; walker management; safety precautions-F carry over  Education Method: Verbal  Education Outcome: Continued education needed                   AM-PAC Score        AM-PAC Inpatient Daily Activity Raw Score: 17 (12/11/22 1637)  AM-PAC Inpatient ADL T-Scale Score : 37.26 (12/11/22 1637)  ADL Inpatient CMS 0-100% Score: 50.11 (12/11/22 1637)  ADL Inpatient CMS G-Code Modifier : CK (12/11/22 1637)        Goals  Short Term Goals  Time Frame for Short Term Goals: Patient will, by discharge  Short Term Goal 1: demo UB ADLs at Mod I  Short Term Goal 2: demo LB ADLs at SBA using AE PRN  Short Term Goal 3: demo bed mobility at SBA to engage in functional tasks  Short Term Goal 4: demo grooming/self-feeding at Mod I using AE PRN  Short Term Goal 5: demo 15+ min of dynamic sitting balance at SBA to engage in ADL tasks  Short Term Goal 6: demo functional transfers at [address] [facility] using LRD to engage in ADLs       Therapy Time   Individual Concurrent Group Co-treatment   Time In 1430         Time Out 1506         Minutes 36         Timed Code Treatment Minutes: 13 Minutes (co tx with PTA)       ROSENDO Garcia/SALEEM

## 2022-12-11 NOTE — PROGRESS NOTES
[facility]  Progress Note                   Date:   12/11/2022  Patient name: Bruno Peñaloza  Date of admission:  11/29/2022 12:20 PM  MRN:   [id number]  YOB: 1945  PCP: Bibiana Powers    Reason for Admission: Upper GI bleed [K92.2]    Subjective:       Clinical Changes /Abnormalities: Patient seen and examined. Denies chest pain, on 6 L facemask. Tele/vitals/labs reviewed . Discussed case with RN. Remains sinus rhythm this a.m. Review of Systems    -1.7 L since admission   Medications:   Scheduled Meds:   insulin lispro  0-16 Units SubCUTAneous TID WC    insulin lispro  0-4 Units SubCUTAneous Nightly    pantoprazole  40 mg Oral BID AC    sucralfate  1 g Oral 4x Daily AC & HS    amiodarone  200 mg Oral Daily    metoprolol tartrate  25 mg Oral BID    ferrous sulfate  325 mg Oral BID WC    escitalopram  10 mg Oral Daily    insulin glargine  20 Units SubCUTAneous BID    ezetimibe  10 mg Oral Nightly    famotidine (PEPCID) injection  20 mg IntraVENous BID    tamsulosin  0.4 mg Oral Daily    [Held by provider] heparin (porcine)  5,000 Units SubCUTAneous 3 times per day    dextrose  25 g IntraVENous Once    neomycin-bacitracin-polymyxin   Topical BID    aspirin  81 mg Oral Daily    sodium chloride flush  5-40 mL IntraVENous 2 times per day     Continuous Infusions:   sodium chloride 50 mL/hr at 12/10/22 1652    sodium chloride      dextrose      sodium chloride Stopped (12/06/22 0512)     CBC:   Recent Labs     12/09/22  0350 12/09/22  1330 12/10/22  0255 12/10/22  1749 12/11/22  0332   WBC 7.4  --  9.9  --  11.5*   HGB 7.9*   < > 8.8* 9.1* 8.4*     --  378  --  376    < > = values in this interval not displayed.        BMP:    Recent Labs     12/08/22  1348 12/08/22  2307 12/09/22  0350 12/10/22  0805     --  146* 142   K 3.5* 3.5* 3.6* 3.9     --  107 104   CO2 23  --  22 24   BUN 13  --  14 11   CREATININE 1.14  --  1.39* 1.03   GLUCOSE 233*  --  333* 301* Hepatic:No results for input(s): AST, ALT, ALB, BILITOT, ALKPHOS in the last 72 hours. Troponin: No results for input(s): TROPHS in the last 72 hours. BNP: No results for input(s): BNP in the last 72 hours. Lipids: No results for input(s): CHOL, HDL in the last 72 hours. Invalid input(s): LDLCALCU  INR:   Recent Labs     12/08/22  1348   INR 1.1         Objective:   Vitals: BP (!) 134/49   Pulse 79   Temp 98.7 °F (37.1 °C) (Oral)   Resp 18   Ht 6' 2\" (1.88 m)   Wt 199 lb 11.8 oz (90.6 kg)   SpO2 98%   BMI 25.64 kg/m²   General appearance: alert and cooperative with exam  HEENT: Head: Normocephalic, no lesions, without obvious abnormality. Neck:no JVD, trachea midline, no adenopathy  Lungs: Diminished throughout  Heart: regular rate and rhythm, s1/s2 auscultated, no murmurs,  Abdomen: soft, non-tender, bowel sounds active  Extremities: no edema  Neurologic: not done    04/2018  The left ventricle is normal size. There is normal left ventricular wall thickness. Left ventricle systolic function is normal.      The Ejection Fraction is 55-60%. Grade I diastolic dysfunction. Cannot rule out anteroapical hypokinesis seen only in apical 4 chamber views although endocardium   was not sen well. Echo 04/2022    Left Ventricle: Left ventricle appears normal in size. Wall thickness   is normal. Systolic function is low normal to mildly decreased with an   ejection fraction of 50-55%. Left Atrium: Left atrium is normal in size. The left atrial volume   index is 27.1 mL/m2. Right Ventricle: Right ventricular size appears normal. The right   ventricular basal diameter is 35.0 mm. Systolic function is normal.     Mitral Valve: The leaflets are mildly thickened and exhibit normal   excursion. Aortic Valve: The aortic valve is congenitally bicuspid. The leaflets   exhibit normal excursion. There is moderate sclerosis. There is no   regurgitation. There is mild stenosis.  The calculated aortic valve area is   1.69 cm2. The calculated aortic valve peak gradient is 11.83 mmHg. The   calculated aortic valve mean gradient is 6.00 mmHg. Assessment / Acute Cardiac Problems:   Shock likely hypovolemic secondary to bleeding duodenal ulcer, currently on pressor support. Status post EGD 11/29/2022 with bipolar cautery of bleeding duodenal ulcer. CAD status post PCI X 3 in 2012 and CABG X2 with LIMA to distal LAD, SVG to OM1 with exploration of distal PDA 10/25/2022. A. fib which was newly diagnosed in November 2022, on Coumadin and amiodarone. Preserved LVEF on echocardiogram as above. Elevated troponin 910 with mild uptrend. Likely secondary to bleed. Acute hypoxic respiratory failure secondary to above. Bicuspid Aortic valve. Hypertension. Hyperlipidemia. Chronic LBBB. Patient Active Problem List:     Upper GI bleed     Atrial fibrillation with rapid ventricular response (HCC)     Essential hypertension     S/P CABG (coronary artery bypass graft)     Anemia due to blood loss, acute     Type 2 diabetes mellitus with hyperglycemia, with long-term current use of insulin (HCC)     Hemorrhagic shock (HCC)     Orthostatic dizziness     Lethargy     Demand ischemia ([facility] [address]      Plan of Treatment:   History of A. fib RVR. NSR now.  -continue p.o. Amio and Lopressor. Continue to hold off on AC at this time. Consider restarting if/when HGB remains stable as OP however GI notes indicate ulcer is high risk for re-bleed. Echo reviewed as above  Keep K>4, Mg>2 . Will sign off.   Please call with further questions or concerns     Electronically signed by JERALD Parish CNP on 12/11/2022 at 6:41 [id number] [facility].  [phone]

## 2022-12-11 NOTE — PROCEDURES
INSTRUMENTAL SWALLOW REPORT  MODIFIED BARIUM SWALLOW    NAME: Barb Mulligan   : 1945  MRN: [id number]       Date of Eval: 2022              Referring Diagnosis(es):      Past Medical History:  has a past medical history of A-fib ([facility] CAD (coronary artery disease), Depression, DM2 (diabetes mellitus, type 2) ([facility] [address] and HTN (hypertension). Past Surgical History:  has a past surgical history that includes Upper gastrointestinal endoscopy (N/A, 2022); Coronary artery bypass graft; Carotid endarterectomy (Right); Carpal tunnel release; Cholecystectomy; Cardiac catheterization; and Colonoscopy. Type of Study: Initial MBS         Patient Complaints/Reason for Referral:  Barb Mulligan was referred for a MBS to assess the efficiency of his/her swallow function, assess for aspiration, and to make recommendations regarding safe dietary consistencies, effective compensatory strategies, and safe eating environment. Onset of problem:    Obtained from H&P  Barb Mulligan is a 68 y.o. presented from outlying facilty with past medical history significant for CAD s/p CABG 10/25/2022, atrial fibrillation on Coumadin, diabetes, , HTN, HLD, depression. Presented for painless rectal bleeding multiple times with hg dropped from 8.6 to 6.8. received 2 units PRBC, 2 units FFP, and 1 unit cryoprecipitate as well as TXA, 10 mg of vitamin K.and 2000 units Kcentra for INR of 3.09. EGD 2022 and found to have a duodenal ulcer with duodenitis but no active bleeding. He had a colonoscopy with reports of no source of bleeding found. According to the chart of transfer, patient had multiple episodes of hematemesis and the repeat EGD showed old blood and no active duodenal ulcer treated with epinephrine. Patient was intubated and transfereed to [facility] for higher care.         Pt had been seen by ST at bedside and was on Dysphagia soft and bite/sized (Dysphagia III) and Mildly Thick (nectar) liquids. Behavior/Cognition/Vision/Hearing:  Behavior/Cognition: Alert; Cooperative;Pleasant mood  Vision: Within Functional Limits  Hearing: Within functional limits    Impressions:     Patient Position: Lateral        Consistencies Administered: Regular; Soft & Bite Sized;Pureed; Thin straw;Mildly Thick straw       Dysphagia Outcome Severity Scale: Level 5: Mild dysphagia- Distant supervision. May need one diet consistency restricted  Penetration-Aspiration Scale (PAS): 2 - Material enters the airway, remains above the vocal folds, and is ejected from the airway      Patient presents with probable safe swallow for Easy to Chew with thin liquids, using  Double Swallow strategy. Recommend close monitoring for overt/clinical s/s of aspiration and D/C PO intake and complete Modified Barium Swallow Study should they occur. Results and recommendations reported to RN. Educated pt and wife on safe swallow strategies -- Double Swallow, slow rate, small bites/sips and sitting in upright position for feedings. Pt presented the following characteristics on each trial administered:  Puree: -pen -asp; mod residual decreased to none with 2x swallow; pool in valc.; -pre spill; good epiglottic inversion  Soft solid: -pen -asp; min residual; pool in valc, cleared with 2x swallow; -pre spill; good epiglottic inversion  Regular: -pen -asp; extended mastication time; -pre spill; good epiglottic inversion; mod residual, cleared with 2x swallow  Mildly thick straw: -pen -asp; min residual; pool valc, cleared with 2x swallow  Thin straw 1: +pen (flash) -asp; large sip taken; mod residual, cleared with 2x swallow; +pre spill to pyriform  Thin straw 1: -pen -asp; min residual, cleared with 2x swallow          Recommended Diet:  Solid consistency: Easy to Chew (with Double Swallow strategy)  Liquid consistency:  Thin (with Double Swallow strategy)       Medication administration: PO (with Double Swallow strategy)    Safe Swallow Protocol:  Supervision: Distant  Compensatory Swallowing Strategies : Alternate solids and liquids;Eat/Feed slowly; Small bites/sips;Swallow 2 times per bite/sip        Recommendations/Treatment  Requires SLP Intervention: Yes        D/C Recommendations: Ongoing speech therapy is recommended during this hospitalization; Ongoing speech therapy is recommended at next level of care         Recommended Exercises:    Therapeutic Interventions: Diet tolerance monitoring;Patient/Family education;Pharyngeal exercises         Education: Images and recommendations were reviewed with pt/wife, RN following this exam.   Patient Education: Discussed results with pt and wife  Patient Education Response: Verbalizes understanding           Goals:       Short Term:             Dysphagia Goals:   Goal 1: The patient will tolerate recommended diet without observed clinical signs of aspiration  Goal 2: The patient will recall and perform compensatory strategies, with no cues. Oral Preparation / Oral Phase   Premature spillage to pyriform (thin straw only)  Extended mastication time (regular)        Pharyngeal Phase   +pen on thin by straw 1x, flash  Min-mod residual that cleared with 2x swallow  Pooling in valc. Esophageal Phase  Esophageal Screen: WFL        Pain      Pain Level: 0      Therapy Time:   Individual Concurrent Group Co-treatment   Time In 0910         Time Out 0920         Minutes 10                   Paul Stout M.A.  CCC-SLP   12/11/2022, 9:51 AM

## 2022-12-12 ENCOUNTER — APPOINTMENT (OUTPATIENT)
Dept: CT IMAGING | Age: 77
DRG: 377 | End: 2022-12-12
Attending: INTERNAL MEDICINE
Payer: MEDICARE

## 2022-12-12 ENCOUNTER — APPOINTMENT (OUTPATIENT)
Dept: GENERAL RADIOLOGY | Age: 77
DRG: 377 | End: 2022-12-12
Attending: INTERNAL MEDICINE
Payer: MEDICARE

## 2022-12-12 LAB
ALBUMIN SERPL-MCNC: 2.5 G/DL (ref 3.5–5.2)
ALBUMIN/GLOBULIN RATIO: 0.8 (ref 1–2.5)
ALP BLD-CCNC: 128 U/L (ref 40–129)
ALT SERPL-CCNC: 16 U/L (ref 5–41)
AST SERPL-CCNC: 23 U/L
BACTERIA: ABNORMAL
BILIRUB SERPL-MCNC: 0.3 MG/DL (ref 0.3–1.2)
BILIRUBIN DIRECT: 0.1 MG/DL
BILIRUBIN URINE: NEGATIVE
BILIRUBIN, INDIRECT: 0.2 MG/DL (ref 0–1)
COLOR: YELLOW
EPITHELIAL CELLS UA: ABNORMAL /HPF (ref 0–5)
GLUCOSE BLD-MCNC: 135 MG/DL (ref 75–110)
GLUCOSE BLD-MCNC: 144 MG/DL (ref 75–110)
GLUCOSE BLD-MCNC: 158 MG/DL (ref 75–110)
GLUCOSE BLD-MCNC: 183 MG/DL (ref 75–110)
GLUCOSE URINE: NEGATIVE
HCT VFR BLD CALC: 26.5 % (ref 40.7–50.3)
HEMOGLOBIN: 8.8 G/DL (ref 13–17)
KETONES, URINE: ABNORMAL
LEUKOCYTE ESTERASE, URINE: ABNORMAL
MAGNESIUM: 1.9 MG/DL (ref 1.6–2.6)
MCH RBC QN AUTO: 29.3 PG (ref 25.2–33.5)
MCHC RBC AUTO-ENTMCNC: 33.2 G/DL (ref 28.4–34.8)
MCV RBC AUTO: 88.3 FL (ref 82.6–102.9)
NITRITE, URINE: NEGATIVE
NRBC AUTOMATED: 0.2 PER 100 WBC
PDW BLD-RTO: 16.2 % (ref 11.8–14.4)
PH UA: 5.5 (ref 5–8)
PLATELET # BLD: 384 K/UL (ref 138–453)
PMV BLD AUTO: 10.5 FL (ref 8.1–13.5)
POTASSIUM SERPL-SCNC: 3.2 MMOL/L (ref 3.7–5.3)
PROCALCITONIN: 0.11 NG/ML
PROTEIN UA: ABNORMAL
RBC # BLD: 3 M/UL (ref 4.21–5.77)
RBC UA: ABNORMAL /HPF (ref 0–4)
SPECIFIC GRAVITY UA: 1.02 (ref 1–1.03)
TOTAL PROTEIN: 5.5 G/DL (ref 6.4–8.3)
TURBIDITY: ABNORMAL
URINE HGB: ABNORMAL
UROBILINOGEN, URINE: NORMAL
WBC # BLD: 20.4 K/UL (ref 3.5–11.3)
WBC UA: ABNORMAL /HPF (ref 0–5)
YEAST: ABNORMAL

## 2022-12-12 PROCEDURE — 87086 URINE CULTURE/COLONY COUNT: CPT

## 2022-12-12 PROCEDURE — 6360000002 HC RX W HCPCS

## 2022-12-12 PROCEDURE — 80076 HEPATIC FUNCTION PANEL: CPT

## 2022-12-12 PROCEDURE — 6370000000 HC RX 637 (ALT 250 FOR IP): Performed by: INTERNAL MEDICINE

## 2022-12-12 PROCEDURE — 2580000003 HC RX 258: Performed by: STUDENT IN AN ORGANIZED HEALTH CARE EDUCATION/TRAINING PROGRAM

## 2022-12-12 PROCEDURE — 2580000003 HC RX 258: Performed by: FAMILY MEDICINE

## 2022-12-12 PROCEDURE — 87077 CULTURE AEROBIC IDENTIFY: CPT

## 2022-12-12 PROCEDURE — 83735 ASSAY OF MAGNESIUM: CPT

## 2022-12-12 PROCEDURE — 6360000002 HC RX W HCPCS: Performed by: STUDENT IN AN ORGANIZED HEALTH CARE EDUCATION/TRAINING PROGRAM

## 2022-12-12 PROCEDURE — 74176 CT ABD & PELVIS W/O CONTRAST: CPT

## 2022-12-12 PROCEDURE — 36415 COLL VENOUS BLD VENIPUNCTURE: CPT

## 2022-12-12 PROCEDURE — 6370000000 HC RX 637 (ALT 250 FOR IP): Performed by: FAMILY MEDICINE

## 2022-12-12 PROCEDURE — 81001 URINALYSIS AUTO W/SCOPE: CPT

## 2022-12-12 PROCEDURE — 6370000000 HC RX 637 (ALT 250 FOR IP): Performed by: NURSE PRACTITIONER

## 2022-12-12 PROCEDURE — 6360000002 HC RX W HCPCS: Performed by: FAMILY MEDICINE

## 2022-12-12 PROCEDURE — 51798 US URINE CAPACITY MEASURE: CPT

## 2022-12-12 PROCEDURE — 85027 COMPLETE CBC AUTOMATED: CPT

## 2022-12-12 PROCEDURE — 82947 ASSAY GLUCOSE BLOOD QUANT: CPT

## 2022-12-12 PROCEDURE — 99232 SBSQ HOSP IP/OBS MODERATE 35: CPT | Performed by: FAMILY MEDICINE

## 2022-12-12 PROCEDURE — 87186 SC STD MICRODIL/AGAR DIL: CPT

## 2022-12-12 PROCEDURE — 71045 X-RAY EXAM CHEST 1 VIEW: CPT

## 2022-12-12 PROCEDURE — 2060000000 HC ICU INTERMEDIATE R&B

## 2022-12-12 PROCEDURE — 84132 ASSAY OF SERUM POTASSIUM: CPT

## 2022-12-12 PROCEDURE — 84145 PROCALCITONIN (PCT): CPT

## 2022-12-12 RX ORDER — AMOXICILLIN AND CLAVULANATE POTASSIUM 875; 125 MG/1; MG/1
1 TABLET, FILM COATED ORAL EVERY 12 HOURS SCHEDULED
Status: DISCONTINUED | OUTPATIENT
Start: 2022-12-12 | End: 2022-12-12

## 2022-12-12 RX ADMIN — FERROUS SULFATE TAB EC 325 MG (65 MG FE EQUIVALENT) 325 MG: 325 (65 FE) TABLET DELAYED RESPONSE at 09:03

## 2022-12-12 RX ADMIN — METOPROLOL TARTRATE 25 MG: 25 TABLET ORAL at 09:03

## 2022-12-12 RX ADMIN — SUCRALFATE 1 G: 1 TABLET ORAL at 05:47

## 2022-12-12 RX ADMIN — HEPARIN SODIUM 5000 UNITS: 5000 INJECTION INTRAVENOUS; SUBCUTANEOUS at 21:39

## 2022-12-12 RX ADMIN — INSULIN GLARGINE 20 UNITS: 100 INJECTION, SOLUTION SUBCUTANEOUS at 21:38

## 2022-12-12 RX ADMIN — SODIUM CHLORIDE, PRESERVATIVE FREE 5 ML: 5 INJECTION INTRAVENOUS at 10:38

## 2022-12-12 RX ADMIN — SUCRALFATE 1 G: 1 TABLET ORAL at 09:03

## 2022-12-12 RX ADMIN — HEPARIN SODIUM 5000 UNITS: 5000 INJECTION INTRAVENOUS; SUBCUTANEOUS at 05:55

## 2022-12-12 RX ADMIN — FAMOTIDINE 20 MG: 20 TABLET, FILM COATED ORAL at 21:40

## 2022-12-12 RX ADMIN — POLYMYXIN B SULFATE, BACITRACIN ZINC, NEOMYCIN SULFATE: 5000; 3.5; 4 OINTMENT TOPICAL at 21:52

## 2022-12-12 RX ADMIN — SODIUM CHLORIDE, PRESERVATIVE FREE 10 ML: 5 INJECTION INTRAVENOUS at 21:41

## 2022-12-12 RX ADMIN — CEFTRIAXONE SODIUM 1000 MG: 10 INJECTION, POWDER, FOR SOLUTION INTRAVENOUS at 15:05

## 2022-12-12 RX ADMIN — AMIODARONE HYDROCHLORIDE 200 MG: 200 TABLET ORAL at 09:03

## 2022-12-12 RX ADMIN — METOCLOPRAMIDE 10 MG: 5 INJECTION, SOLUTION INTRAMUSCULAR; INTRAVENOUS at 01:47

## 2022-12-12 RX ADMIN — SUCRALFATE 1 G: 1 TABLET ORAL at 15:13

## 2022-12-12 RX ADMIN — FERROUS SULFATE TAB EC 325 MG (65 MG FE EQUIVALENT) 325 MG: 325 (65 FE) TABLET DELAYED RESPONSE at 15:04

## 2022-12-12 RX ADMIN — SUCRALFATE 1 G: 1 TABLET ORAL at 21:40

## 2022-12-12 RX ADMIN — TAMSULOSIN HYDROCHLORIDE 0.4 MG: 0.4 CAPSULE ORAL at 09:04

## 2022-12-12 RX ADMIN — FAMOTIDINE 20 MG: 20 TABLET, FILM COATED ORAL at 09:04

## 2022-12-12 RX ADMIN — METOPROLOL TARTRATE 25 MG: 25 TABLET ORAL at 21:39

## 2022-12-12 RX ADMIN — HEPARIN SODIUM 5000 UNITS: 5000 INJECTION INTRAVENOUS; SUBCUTANEOUS at 15:04

## 2022-12-12 RX ADMIN — METOCLOPRAMIDE 10 MG: 5 INJECTION, SOLUTION INTRAMUSCULAR; INTRAVENOUS at 09:12

## 2022-12-12 RX ADMIN — INSULIN GLARGINE 20 UNITS: 100 INJECTION, SOLUTION SUBCUTANEOUS at 09:19

## 2022-12-12 RX ADMIN — AMOXICILLIN AND CLAVULANATE POTASSIUM 1 TABLET: 875; 125 TABLET, FILM COATED ORAL at 12:44

## 2022-12-12 RX ADMIN — PANTOPRAZOLE SODIUM 40 MG: 40 TABLET, DELAYED RELEASE ORAL at 15:04

## 2022-12-12 RX ADMIN — PANTOPRAZOLE SODIUM 40 MG: 40 TABLET, DELAYED RELEASE ORAL at 05:47

## 2022-12-12 RX ADMIN — ASPIRIN 81 MG: 81 TABLET, CHEWABLE ORAL at 09:03

## 2022-12-12 RX ADMIN — ESCITALOPRAM OXALATE 10 MG: 10 TABLET ORAL at 09:05

## 2022-12-12 RX ADMIN — ONDANSETRON 4 MG: 2 INJECTION INTRAMUSCULAR; INTRAVENOUS at 04:16

## 2022-12-12 RX ADMIN — ONDANSETRON 4 MG: 2 INJECTION INTRAMUSCULAR; INTRAVENOUS at 12:44

## 2022-12-12 RX ADMIN — POTASSIUM CHLORIDE 10 MEQ: 7.46 INJECTION, SOLUTION INTRAVENOUS at 23:10

## 2022-12-12 RX ADMIN — EZETIMIBE 10 MG: 10 TABLET ORAL at 21:40

## 2022-12-12 ASSESSMENT — ENCOUNTER SYMPTOMS
SHORTNESS OF BREATH: 0
WHEEZING: 0
SINUS PRESSURE: 0
CHEST TIGHTNESS: 0
NAUSEA: 1
VOMITING: 0
CHOKING: 0
COUGH: 0
DIARRHEA: 0
CONSTIPATION: 0
BACK PAIN: 0
RHINORRHEA: 0
ABDOMINAL PAIN: 0
VOICE CHANGE: 0

## 2022-12-12 NOTE — CARE COORDINATION
Transitional planning: Received phone call from Madelin Weiss at [facility] requesting information be faxed to [phone]. Writer faxed H&P, most recent progress note, PT/OT notes.

## 2022-12-12 NOTE — PROGRESS NOTES
Comprehensive Nutrition Assessment    Type and Reason for Visit:  Reassess    Nutrition Recommendations/Plan:   As able, suggest modifying to an Easy to [employer] with thin liquids using Double Swallow strategy per Speech Therapy recommendations. Encourage/monitor PO intakes as tolerated. Will continue to provide frozen Magic Cups x 2 per day. Monitor labs, weights, and plan of care. Malnutrition Assessment:  Malnutrition Status:  Insufficient data (12/09/22 1502)    Context:  Acute Illness       Nutrition Assessment:    Pt had a MBSS yesterday and passed for an Easy to [employer] with thin liquids (with double swallow strategy). Noted diet has not been upgraded. Pt with c/o nausea and dry heaving. Pt with an episode of emesis this morning. Pt not eating much of meals and taking some fluids today. Will continue to provide oral supplements and monitor PO intakes. Labs reviewed. Meds include: Lantus, Reglan PRN, Zofran PRN. Nutrition Related Findings:    Meds/Labs reviewed. C/o nausea and dry heaving. Last BM 12/12. Wound Type: None       Current Nutrition Intake & Therapies:    Average Meal Intake: 1-25%  Average Supplements Intake: 1-25%  ADULT DIET; Dysphagia - Soft and Bite Sized; 4 carb choices (60 gm/meal); Mildly Thick (Nectar)  ADULT ORAL NUTRITION SUPPLEMENT; Breakfast, Dinner; Frozen Oral Supplement    Anthropometric Measures:  Height: 6' 2\" (188 cm)  Ideal Body Weight (IBW): 190 lbs (86 kg)    Admission Body Weight: 207 lb 0.2 oz (93.9 kg)  Current Body Weight: 199 lb 11.8 oz (90.6 kg), 105.1 % IBW. Weight Source: Bed Scale  Current BMI (kg/m2): 25.6                          BMI Categories: Overweight (BMI 25.0-29. 9)    Estimated Daily Nutrient Needs:  Energy Requirements Based On: Kcal/kg  Weight Used for Energy Requirements: Current  Energy (kcal/day): 5879-6754 kcals/day  Weight Used for Protein Requirements: Ideal  Protein (g/day): 100-120 gm pro/day  Method Used for Fluid Requirements: Other (Comment)  Fluid (ml/day): per MD    Nutrition Diagnosis:   Inadequate oral intake related to cognitive or neurological impairment, swallowing difficulty as evidenced by swallow study results, intake 0-25%, nausea, vomiting (variable PO intakes; need for ONS)    Nutrition Interventions:   Food and/or Nutrient Delivery: Continue Current Diet, Continue Oral Nutrition Supplement  Nutrition Education/Counseling: No recommendation at this time, Education not indicated  Coordination of Nutrition Care: Continue to monitor while inpatient, Speech Therapy, Swallow Evaluation  Plan of Care discussed with: RN    Goals:  Previous Goal Met: Progressing toward Goal(s)  Goals: Meet at least 75% of estimated needs, within 7 days       Nutrition Monitoring and Evaluation:   Behavioral-Environmental Outcomes: None Identified  Food/Nutrient Intake Outcomes: Food and Nutrient Intake, Supplement Intake  Physical Signs/Symptoms Outcomes: Biochemical Data, Chewing or Swallowing, GI Status, Fluid Status or Edema, Nausea or Vomiting, Nutrition Focused Physical Findings, Skin, Weight    Discharge Planning:     Too soon to determine     Rose Mary Fan, [address] [place] [address],   Contact: [phone]

## 2022-12-12 NOTE — PROGRESS NOTES
Patient given medications with applesauce. Patient started dry heaving. No vomit noted. Dr. Mcmillan notified.

## 2022-12-12 NOTE — PLAN OF CARE
Problem: Discharge Planning  Goal: Discharge to home or other facility with appropriate resources  Outcome: Progressing     Problem: Confusion  Goal: Confusion, delirium, dementia, or psychosis is improved or at baseline  Description: INTERVENTIONS:  1. Assess for possible contributors to thought disturbance, including medications, impaired vision or hearing, underlying metabolic abnormalities, dehydration, psychiatric diagnoses, and notify attending LIP  2. [place] high risk fall precautions, as indicated  3. Provide frequent short contacts to provide reality reorientation, refocusing and direction  4. Decrease environmental stimuli, including noise as appropriate  5. Monitor and intervene to maintain adequate nutrition, hydration, elimination, sleep and activity  6. If unable to ensure safety without constant attention obtain sitter and review sitter guidelines with assigned personnel  7. Initiate Psychosocial CNS and Spiritual Care consult, as indicated  Outcome: Progressing     Problem: Pain  Goal: Verbalizes/displays adequate comfort level or baseline comfort level  Outcome: Progressing     Problem: Cardiovascular - Adult  Goal: Maintains optimal cardiac output and hemodynamic stability  Outcome: Progressing  Goal: Absence of cardiac dysrhythmias or at baseline  Outcome: Progressing     Problem: Gastrointestinal - Adult  Goal: Minimal or absence of nausea and vomiting  Outcome: Progressing  Goal: Maintains or returns to baseline bowel function  Outcome: Progressing  Goal: Maintains adequate nutritional intake  Outcome: Progressing  Goal: Establish and maintain optimal ostomy function  Outcome: Progressing     Problem: Respiratory - Adult  Goal: Achieves optimal ventilation and oxygenation  Outcome: Progressing     Problem: Nutrition Deficit:  Goal: Optimize nutritional status  Outcome: Progressing     Problem: Skin/Tissue Integrity  Goal: Absence of new skin breakdown  Description: 1.   Monitor for areas of redness and/or skin breakdown  2. Assess vascular access sites hourly  3. Every 4-6 hours minimum:  Change oxygen saturation probe site  4. Every 4-6 hours:  If on nasal continuous positive airway pressure, respiratory therapy assess nares and determine need for appliance change or resting period.   Outcome: Progressing     Problem: Safety - Adult  Goal: Free from fall injury  Outcome: Progressing     Problem: ABCDS Injury Assessment  Goal: Absence of physical injury  Outcome: Progressing     Problem: Neurosensory - Adult  Goal: Achieves stable or improved neurological status  Outcome: Progressing  Goal: Absence of seizures  Outcome: Progressing  Goal: Remains free of injury related to seizures activity  Outcome: Progressing  Goal: Achieves maximal functionality and self care  Outcome: Progressing     Problem: Skin/Tissue Integrity - Adult  Goal: Skin integrity remains intact  Outcome: Progressing  Goal: Incisions, wounds, or drain sites healing without S/S of infection  Outcome: Progressing  Goal: Oral mucous membranes remain intact  Outcome: Progressing     Problem: Chronic Conditions and Co-morbidities  Goal: Patient's chronic conditions and co-morbidity symptoms are monitored and maintained or improved  Outcome: Progressing

## 2022-12-12 NOTE — PROGRESS NOTES
notes reviewed. Overnight events and updates discussed with Nursing staff . Background History:         Hilary Gregg is 68 y.o. male  Who was admitted to the hospital on 11/29/2022 for treatment of Hemorrhagic shock ([facility] [address] Patient was life flighted from [facility] for rectal bleed at outlying facility. Patient has underlying history of CAD with CABG 2 months before, A. fib on Coumadin, diabetes mellitus, hypertension. Patient had EGD x2 at outlying facility and did not show any active bleed from duodenal ulcer. Patient had severe anemia 6.8. Patient was intubated secondary to respiratory failure and transferred to [address] [place] [address]. Patient was given multiple transfusion. Coagulopathy from Coumadin was reversed with FFP, cryo, Kcentra, vitamin K. Patient underwent EGD on 11/28/2022 and showed duodenitis, nonbleeding duodenal ulcer with a visible vessel that was cauterized with bipolar probe. Patient was treated with prolonged Protonix infusion and hold anticoagulants and antiplatelet medications were held. Patient also required pressor support and fluid resuscitation in ICU. Patient was successfully extubated and treated with NIV postextubation. Patient continued to have tarry stools. Aspirin was resumed due to recent CAD and CABG after GI clearance on 12/6/2022. Patient also has history of A. fib and has been having rapid ventricular response. He was also treated with amiodarone. PMH:  Past Medical History:   Diagnosis Date    A-fib ([facility]     CAD (coronary artery disease)     Depression     DM2 (diabetes mellitus, type 2) (HCC)     HTN (hypertension)       Allergies:    Allergies   Allergen Reactions    Atorvastatin Hives      Medications :  famotidine, 20 mg, Oral, BID  insulin lispro, 0-16 Units, SubCUTAneous, TID WC  insulin lispro, 0-4 Units, SubCUTAneous, Nightly  pantoprazole, 40 mg, Oral, BID AC  sucralfate, 1 g, Oral, 4x Daily AC & HS  amiodarone, 200 mg, Oral, Daily  metoprolol tartrate, 25 mg, Oral, BID  ferrous sulfate, 325 mg, Oral, BID WC  escitalopram, 10 mg, Oral, Daily  insulin glargine, 20 Units, SubCUTAneous, BID  ezetimibe, 10 mg, Oral, Nightly  tamsulosin, 0.4 mg, Oral, Daily  heparin (porcine), 5,000 Units, SubCUTAneous, 3 times per day  dextrose, 25 g, IntraVENous, Once  neomycin-bacitracin-polymyxin, , Topical, BID  aspirin, 81 mg, Oral, Daily  sodium chloride flush, 5-40 mL, IntraVENous, 2 times per day      Review of Systems   Review of Systems   Constitutional:  Negative for activity change, appetite change, fatigue, fever and unexpected weight change. HENT:  Negative for congestion, nosebleeds, rhinorrhea, sinus pressure, sneezing and voice change. Respiratory:  Negative for cough, choking, chest tightness, shortness of breath and wheezing. Cardiovascular:  Negative for chest pain, palpitations and leg swelling. Gastrointestinal:  Positive for nausea. Negative for abdominal pain, constipation, diarrhea and vomiting. Genitourinary:  Negative for difficulty urinating, dysuria, frequency, penile discharge and testicular pain. Musculoskeletal:  Negative for back pain. Skin:  Negative for rash. Neurological:  Negative for dizziness, weakness, light-headedness and headaches. Hematological:  Does not bruise/bleed easily. Psychiatric/Behavioral:  Positive for sleep disturbance. Negative for agitation, behavioral problems, confusion, self-injury and suicidal ideas.     Objective :      Current Vitals : Temp: 98.8 °F (37.1 °C),  Heart Rate: 81, Resp: 19, BP: (!) 134/52, SpO2: 96 %  Last 24 Hrs Vitals   Patient Vitals for the past 24 hrs:   BP Temp Temp src Pulse Resp SpO2   12/12/22 0600 -- -- -- 81 19 --   12/12/22 0500 -- -- -- 70 19 --   12/12/22 0400 -- -- -- 75 16 --   12/12/22 0330 (!) 134/52 98.8 °F (37.1 °C) Temporal 77 20 96 %   12/12/22 0300 -- -- -- 72 16 96 %   12/12/22 0200 -- -- -- 73 17 94 %   12/12/22 0100 -- -- -- 69 16 98 % 12/12/22 0000 -- -- -- 68 17 95 %   12/11/22 2330 (!) 135/58 98.7 °F (37.1 °C) Temporal 71 18 94 %   12/11/22 2300 -- -- -- 65 19 94 %   12/11/22 2200 -- -- -- 73 17 96 %   12/11/22 2118 (!) 161/48 -- -- 80 16 97 %   12/11/22 2100 -- -- -- 87 17 96 %   12/11/22 2000 -- -- -- 79 11 97 %   12/11/22 1920 (!) 159/134 98.6 °F (37 °C) Oral 80 18 95 %   12/11/22 1900 -- -- -- -- -- 99 %   12/11/22 1628 (!) 160/62 -- -- 71 14 99 %   12/11/22 1127 (!) 133/58 98.9 °F (37.2 °C) Oral 67 18 96 %   12/11/22 0945 (!) 179/69 -- -- 81 -- --       Intake / output   12/10 1901 - 12/12 0700  In: 774.5 [P.O.:225; I.V.:549.5]  Out: 1743 [[id number]]  Physical Exam:  Physical Exam  Vitals and nursing note reviewed. Constitutional:       General: He is not in acute distress. Appearance: He is not diaphoretic. Interventions: Nasal cannula in place. HENT:      Head: Normocephalic and atraumatic. Nose:      Right Sinus: No maxillary sinus tenderness or frontal sinus tenderness. Left Sinus: No maxillary sinus tenderness or frontal sinus tenderness. Mouth/Throat:      Pharynx: No oropharyngeal exudate. Eyes:      General: No scleral icterus. Conjunctiva/sclera: Conjunctivae normal.      Pupils: Pupils are equal, round, and reactive to light. Neck:      Thyroid: No thyromegaly. Vascular: No JVD. Cardiovascular:      Rate and Rhythm: Normal rate and regular rhythm. Pulses:           Dorsalis pedis pulses are 2+ on the right side and 2+ on the left side. Heart sounds: Normal heart sounds. No murmur heard. Pulmonary:      Effort: Pulmonary effort is normal.      Breath sounds: Normal breath sounds. No wheezing or rales. Abdominal:      Palpations: Abdomen is soft. There is no mass. Tenderness: There is no abdominal tenderness. Musculoskeletal:      Cervical back: Full passive range of motion without pain and neck supple.    Lymphadenopathy:      Head:      Right side of head: No submandibular adenopathy. Left side of head: No submandibular adenopathy. Cervical: No cervical adenopathy. Skin:     General: Skin is warm. Neurological:      Mental Status: He is lethargic and disoriented. Motor: No tremor. Psychiatric:         Behavior: Behavior is uncooperative and slowed. Laboratory findings:    Recent Labs     12/10/22  0255 12/10/22  1749 12/11/22  0332 12/11/22  1843 12/12/22  0624   WBC 9.9  --  11.5*  --  20.4*   HGB 8.8*   < > 8.4* 8.5* 8.8*   HCT 27.1*   < > 26.4* 29.1* 26.5*     --  376  --  384    < > = values in this interval not displayed. Recent Labs     12/10/22  0805      K 3.9      CO2 24   GLUCOSE 301*   BUN 11   CREATININE 1.03   MG 2.1   CALCIUM 7.8*       No results for input(s): PROT, LABALBU, LABA1C, K7DLDLR, L8TPKYE, FT4, TSH, AST, ALT, LDH, GGT, ALKPHOS, BILITOT, BILIDIR, AMMONIA, AMYLASE, LIPASE, LACTATE, CHOL, HDL, LDLCHOLESTEROL, CHOLHDLRATIO, TRIG, VLDL, BNP, TROPONINI, CKTOTAL, CKMB, CKMBINDEX, RF, ALFREDITO in the last 72 hours. RBC, UA   Date Value Ref Range Status   11/29/2022 2 TO 5 0 - 4 /HPF Final     Comment:     Reference range defined for non-centrifuged specimen. WBC, UA   Date Value Ref Range Status   11/29/2022 20 TO 50 0 - 5 /HPF Final       Imaging / Clinical Data :-   CT HEAD WO CONTRAST    Result Date: 12/2/2022  No acute intracranial abnormality. XR CHEST PORTABLE    Result Date: 12/8/2022  Removal of the endotracheal and gastric tubes. Improved bibasilar airspace opacities. XR CHEST PORTABLE    Result Date: 12/4/2022  Increased left basilar atelectasis or pneumonia. Mild central vascular congestion. XR CHEST PORTABLE    Result Date: 12/2/2022  1. Endotracheal tube in expected position. 2. Bibasilar pulmonary opacities, atelectasis versus pneumonia. XR ABDOMEN FOR NG/OG/NE TUBE PLACEMENT    Result Date: 12/2/2022  Enteric tube in expected position.         Clinical Course : gradually improving  Assessment and Plan  :        Upper GI bleed due to duodenal ulcer and duodenitis -underwent EGD with epinephrine injection into the ulcer. Treated with Protonix infusion currently on 40 mg orally twice daily dose, Pepcid, and Carafate. CAD s/p CABG on 10/25/2022 -continue aspirin. Monitor for GI bleed. Paroxysmal atrial fibrillation -Coumadin on hold due to duodenal ulcer. Continue amiodarone. Outpatient follow-up with GI for clearance for anticoagulation. Patient and his wife at bedside updated about limitation on using anticoagulation and risk of stroke from atrial fibrillation. Essential hypertension -low-dose beta-blocker with parameters  Type 2 diabetes mellitus-continue Lantus 20 units twice daily. Continue Humalog to high correction scale. Hypovolemic shock - blood pressure stable now after fluid resuscitation and pressor support. Urinary retention -on Flomax. Remove Carrillo catheter. Check postvoid residual.  Anemia due to acute blood loss -iron supplement. Delirium -due to prolonged hospitalization and medications. Avoid sedatives, benzodiazepines. Worsening leucocytosis - check UA , Chest Xray, Procalcitonin . UA positive, start Rocephin for UTI. Intractable nausea-check CT abdomen pelvis without contrast.        PT OT  Transfer to [facility]. Expected discharge to home soon. Will  need home care and home O2 evaluation at discharge. Continue to monitor vitals , Intake / output ,  Cell count , HGB , Kidney function, oxygenation  as indicated . Plan and updates discussed with patient ,  answers  explained to satisfaction.    Plan discussed with Staff Shey Ashby RN     (Please note that portions of this note were completed with a voice recognition program. Efforts were made to edit the dictations but occasionally words are mis-transcribed.)      Brock Wheeler MD  12/12/2022

## 2022-12-13 ENCOUNTER — APPOINTMENT (OUTPATIENT)
Dept: GENERAL RADIOLOGY | Age: 77
DRG: 377 | End: 2022-12-13
Attending: INTERNAL MEDICINE
Payer: MEDICARE

## 2022-12-13 PROBLEM — I50.9 CHF EXACERBATION (HCC): Status: ACTIVE | Noted: 2022-12-13

## 2022-12-13 PROBLEM — J96.01 ACUTE RESPIRATORY FAILURE WITH HYPOXIA (HCC): Status: ACTIVE | Noted: 2022-12-13

## 2022-12-13 LAB
ALBUMIN SERPL-MCNC: 2.8 G/DL (ref 3.5–5.2)
ANION GAP SERPL CALCULATED.3IONS-SCNC: 11 MMOL/L (ref 9–17)
BUN BLDV-MCNC: 17 MG/DL (ref 8–23)
CALCIUM SERPL-MCNC: 8.2 MG/DL (ref 8.6–10.4)
CHLORIDE BLD-SCNC: 96 MMOL/L (ref 98–107)
CO2: 28 MMOL/L (ref 20–31)
CREAT SERPL-MCNC: 1.14 MG/DL (ref 0.7–1.2)
CULTURE: ABNORMAL
GFR SERPL CREATININE-BSD FRML MDRD: >60 ML/MIN/1.73M2
GLUCOSE BLD-MCNC: 109 MG/DL (ref 75–110)
GLUCOSE BLD-MCNC: 149 MG/DL (ref 75–110)
GLUCOSE BLD-MCNC: 151 MG/DL (ref 75–110)
GLUCOSE BLD-MCNC: 153 MG/DL (ref 70–99)
GLUCOSE BLD-MCNC: 97 MG/DL (ref 75–110)
GLUCOSE BLD-MCNC: 98 MG/DL (ref 75–110)
HCT VFR BLD CALC: 21.7 % (ref 40.7–50.3)
HEMOGLOBIN: 7.6 G/DL (ref 13–17)
MAGNESIUM: 2.1 MG/DL (ref 1.6–2.6)
MCH RBC QN AUTO: 32.3 PG (ref 25.2–33.5)
MCHC RBC AUTO-ENTMCNC: 35 G/DL (ref 28.4–34.8)
MCV RBC AUTO: 92.3 FL (ref 82.6–102.9)
NRBC AUTOMATED: 0.1 PER 100 WBC
PDW BLD-RTO: 17.2 % (ref 11.8–14.4)
PHOSPHORUS: 2.6 MG/DL (ref 2.5–4.5)
PLATELET # BLD: 750 K/UL (ref 138–453)
PMV BLD AUTO: 10.8 FL (ref 8.1–13.5)
POTASSIUM SERPL-SCNC: 3.1 MMOL/L (ref 3.7–5.3)
PRO-BNP: ABNORMAL PG/ML
RBC # BLD: 2.35 M/UL (ref 4.21–5.77)
SODIUM BLD-SCNC: 135 MMOL/L (ref 135–144)
SPECIMEN DESCRIPTION: ABNORMAL
WBC # BLD: 18.2 K/UL (ref 3.5–11.3)

## 2022-12-13 PROCEDURE — 97535 SELF CARE MNGMENT TRAINING: CPT

## 2022-12-13 PROCEDURE — 85027 COMPLETE CBC AUTOMATED: CPT

## 2022-12-13 PROCEDURE — 99232 SBSQ HOSP IP/OBS MODERATE 35: CPT | Performed by: INTERNAL MEDICINE

## 2022-12-13 PROCEDURE — 82947 ASSAY GLUCOSE BLOOD QUANT: CPT

## 2022-12-13 PROCEDURE — 80069 RENAL FUNCTION PANEL: CPT

## 2022-12-13 PROCEDURE — 6370000000 HC RX 637 (ALT 250 FOR IP): Performed by: INTERNAL MEDICINE

## 2022-12-13 PROCEDURE — 97530 THERAPEUTIC ACTIVITIES: CPT

## 2022-12-13 PROCEDURE — 51798 US URINE CAPACITY MEASURE: CPT

## 2022-12-13 PROCEDURE — 2580000003 HC RX 258: Performed by: STUDENT IN AN ORGANIZED HEALTH CARE EDUCATION/TRAINING PROGRAM

## 2022-12-13 PROCEDURE — 94761 N-INVAS EAR/PLS OXIMETRY MLT: CPT

## 2022-12-13 PROCEDURE — 83880 ASSAY OF NATRIURETIC PEPTIDE: CPT

## 2022-12-13 PROCEDURE — 36415 COLL VENOUS BLD VENIPUNCTURE: CPT

## 2022-12-13 PROCEDURE — 6360000002 HC RX W HCPCS: Performed by: INTERNAL MEDICINE

## 2022-12-13 PROCEDURE — 2700000000 HC OXYGEN THERAPY PER DAY

## 2022-12-13 PROCEDURE — 6360000002 HC RX W HCPCS

## 2022-12-13 PROCEDURE — 2060000000 HC ICU INTERMEDIATE R&B

## 2022-12-13 PROCEDURE — 6360000002 HC RX W HCPCS: Performed by: STUDENT IN AN ORGANIZED HEALTH CARE EDUCATION/TRAINING PROGRAM

## 2022-12-13 PROCEDURE — 6370000000 HC RX 637 (ALT 250 FOR IP): Performed by: NURSE PRACTITIONER

## 2022-12-13 PROCEDURE — 2580000003 HC RX 258: Performed by: FAMILY MEDICINE

## 2022-12-13 PROCEDURE — 71045 X-RAY EXAM CHEST 1 VIEW: CPT

## 2022-12-13 PROCEDURE — 2580000003 HC RX 258: Performed by: INTERNAL MEDICINE

## 2022-12-13 PROCEDURE — 6370000000 HC RX 637 (ALT 250 FOR IP): Performed by: FAMILY MEDICINE

## 2022-12-13 PROCEDURE — 83735 ASSAY OF MAGNESIUM: CPT

## 2022-12-13 PROCEDURE — 6360000002 HC RX W HCPCS: Performed by: FAMILY MEDICINE

## 2022-12-13 RX ORDER — INSULIN GLARGINE 100 [IU]/ML
10 INJECTION, SOLUTION SUBCUTANEOUS 2 TIMES DAILY
Status: DISCONTINUED | OUTPATIENT
Start: 2022-12-13 | End: 2022-12-14

## 2022-12-13 RX ORDER — POTASSIUM CHLORIDE 20 MEQ/1
20 TABLET, EXTENDED RELEASE ORAL 2 TIMES DAILY WITH MEALS
Status: DISCONTINUED | OUTPATIENT
Start: 2022-12-13 | End: 2022-12-14

## 2022-12-13 RX ORDER — FUROSEMIDE 10 MG/ML
40 INJECTION INTRAMUSCULAR; INTRAVENOUS ONCE
Status: COMPLETED | OUTPATIENT
Start: 2022-12-13 | End: 2022-12-13

## 2022-12-13 RX ADMIN — FERROUS SULFATE TAB EC 325 MG (65 MG FE EQUIVALENT) 325 MG: 325 (65 FE) TABLET DELAYED RESPONSE at 15:53

## 2022-12-13 RX ADMIN — PANTOPRAZOLE SODIUM 40 MG: 40 TABLET, DELAYED RELEASE ORAL at 15:07

## 2022-12-13 RX ADMIN — TAMSULOSIN HYDROCHLORIDE 0.4 MG: 0.4 CAPSULE ORAL at 08:05

## 2022-12-13 RX ADMIN — POTASSIUM CHLORIDE 20 MEQ: 1500 TABLET, EXTENDED RELEASE ORAL at 19:30

## 2022-12-13 RX ADMIN — PANTOPRAZOLE SODIUM 40 MG: 40 TABLET, DELAYED RELEASE ORAL at 08:06

## 2022-12-13 RX ADMIN — HEPARIN SODIUM 5000 UNITS: 5000 INJECTION INTRAVENOUS; SUBCUTANEOUS at 06:01

## 2022-12-13 RX ADMIN — INSULIN GLARGINE 10 UNITS: 100 INJECTION, SOLUTION SUBCUTANEOUS at 21:22

## 2022-12-13 RX ADMIN — INSULIN GLARGINE 10 UNITS: 100 INJECTION, SOLUTION SUBCUTANEOUS at 08:08

## 2022-12-13 RX ADMIN — METOPROLOL TARTRATE 25 MG: 25 TABLET ORAL at 08:05

## 2022-12-13 RX ADMIN — FAMOTIDINE 20 MG: 20 TABLET, FILM COATED ORAL at 21:25

## 2022-12-13 RX ADMIN — POTASSIUM CHLORIDE 10 MEQ: 7.46 INJECTION, SOLUTION INTRAVENOUS at 00:11

## 2022-12-13 RX ADMIN — ASPIRIN 81 MG: 81 TABLET, CHEWABLE ORAL at 08:04

## 2022-12-13 RX ADMIN — POTASSIUM CHLORIDE 20 MEQ: 29.8 INJECTION, SOLUTION INTRAVENOUS at 07:57

## 2022-12-13 RX ADMIN — ONDANSETRON 4 MG: 2 INJECTION INTRAMUSCULAR; INTRAVENOUS at 07:57

## 2022-12-13 RX ADMIN — METOPROLOL TARTRATE 25 MG: 25 TABLET ORAL at 21:25

## 2022-12-13 RX ADMIN — FAMOTIDINE 20 MG: 20 TABLET, FILM COATED ORAL at 08:04

## 2022-12-13 RX ADMIN — CEFTRIAXONE SODIUM 1000 MG: 10 INJECTION, POWDER, FOR SOLUTION INTRAVENOUS at 15:07

## 2022-12-13 RX ADMIN — FUROSEMIDE 40 MG: 10 INJECTION, SOLUTION INTRAMUSCULAR; INTRAVENOUS at 15:52

## 2022-12-13 RX ADMIN — HEPARIN SODIUM 5000 UNITS: 5000 INJECTION INTRAVENOUS; SUBCUTANEOUS at 21:30

## 2022-12-13 RX ADMIN — HEPARIN SODIUM 5000 UNITS: 5000 INJECTION INTRAVENOUS; SUBCUTANEOUS at 15:08

## 2022-12-13 RX ADMIN — METOCLOPRAMIDE 10 MG: 5 INJECTION, SOLUTION INTRAMUSCULAR; INTRAVENOUS at 08:06

## 2022-12-13 RX ADMIN — SODIUM CHLORIDE, PRESERVATIVE FREE 5 ML: 5 INJECTION INTRAVENOUS at 08:06

## 2022-12-13 RX ADMIN — SUCRALFATE 1 G: 1 TABLET ORAL at 21:25

## 2022-12-13 RX ADMIN — SUCRALFATE 1 G: 1 TABLET ORAL at 12:00

## 2022-12-13 RX ADMIN — EZETIMIBE 10 MG: 10 TABLET ORAL at 21:25

## 2022-12-13 RX ADMIN — ESCITALOPRAM OXALATE 10 MG: 10 TABLET ORAL at 08:05

## 2022-12-13 RX ADMIN — AMIODARONE HYDROCHLORIDE 200 MG: 200 TABLET ORAL at 08:07

## 2022-12-13 RX ADMIN — SODIUM CHLORIDE: 9 INJECTION, SOLUTION INTRAVENOUS at 07:55

## 2022-12-13 RX ADMIN — SUCRALFATE 1 G: 1 TABLET ORAL at 08:04

## 2022-12-13 RX ADMIN — SODIUM CHLORIDE, PRESERVATIVE FREE 5 ML: 5 INJECTION INTRAVENOUS at 21:25

## 2022-12-13 RX ADMIN — SUCRALFATE 1 G: 1 TABLET ORAL at 17:18

## 2022-12-13 RX ADMIN — FERROUS SULFATE TAB EC 325 MG (65 MG FE EQUIVALENT) 325 MG: 325 (65 FE) TABLET DELAYED RESPONSE at 08:05

## 2022-12-13 ASSESSMENT — ENCOUNTER SYMPTOMS
VOMITING: 0
COUGH: 0
APNEA: 0
DIARRHEA: 0
STRIDOR: 0
BACK PAIN: 0
NAUSEA: 0
SHORTNESS OF BREATH: 0

## 2022-12-13 NOTE — PROGRESS NOTES
Pt had a 12 beat run of vtach. Pt denied any complaints or symptoms. NOY Glass notified and stat potassium and magnesium labs ordered. Mag came back at 1.9 and potassium @ 3.2. Writer followed PRN potassium replacement and 10 meq of IV potassium initiated with another 10 meq potassium to follow.

## 2022-12-13 NOTE — PROGRESS NOTES
Occupational Therapy  Facility/Department: [address] NEURO  Occupational Daily Treatment Note    Name: Staci Gupta  : 1945  MRN: [id number]  Date of Service: 2022    Discharge Recommendations:  Patient would benefit from continued therapy after discharge Further therapy recommended at discharge. The patient should be able to tolerate at least 3 hours of therapy per day over 5 days or 15 hours over 7 days. This patient may benefit from a Physical Medicine and Rehab consult. Patient Diagnosis(es): There were no encounter diagnoses. Past Medical History:  has a past medical history of A-fib ([facility] [address] CAD (coronary artery disease), Depression, DM2 (diabetes mellitus, type 2) ([facility] and HTN (hypertension). Past Surgical History:  has a past surgical history that includes Upper gastrointestinal endoscopy (N/A, 2022); Coronary artery bypass graft; Carotid endarterectomy (Right); Carpal tunnel release; Cholecystectomy; Cardiac catheterization; and Colonoscopy. Assessment   Performance deficits / Impairments: Decreased functional mobility ; Decreased ADL status; Decreased endurance;Decreased high-level IADLs;Decreased safe awareness;Decreased balance;Decreased strength  Prognosis: Good  Activity Tolerance  Activity Tolerance: Patient Tolerated treatment well;Patient limited by fatigue;Treatment limited secondary to medical complications (free text)  Activity Tolerance Comments: nausea.         Plan   Occupational Therapy Plan  Times Per Week: 4-5x/wk  Current Treatment Recommendations: Strengthening, Balance training, Functional mobility training, Endurance training, Equipment evaluation, education, & procurement, Patient/Caregiver education & training, Home management training, Positioning, Safety education & training     Restrictions  Restrictions/Precautions  Restrictions/Precautions: Up as Tolerated  Required Braces or Orthoses?: No  Position Activity Restriction  Other position/activity restrictions: O2 NC 2 lm. Pain assessment: Pt c/o pain in R wrist/forearm from previous blood transfusion, tender did not state pain number. Subjective   General  Chart Reviewed: Yes  Patient assessed for rehabilitation services?: Yes  Family / Caregiver Present: Yes (spouse)     Safety Devices  Type of Devices: All fall risk precautions in place;Call light within reach; Chair alarm in place;Gait belt;Nurse notified; Left in chair  Balance  Sitting: With support (SBA seated EOB x4 minutes; seated in chair x6 minutes first time then seated in chair at end of session.)  Standing: With support (CGA/min A static standing EOB/chair using RW, functional mobility to/from bathroom uisng RW, simple grooming standing at sink. Total time 12 minutes with seated rest break x1. Unsteadiness noted standing at sink with pt leaning into wall.)  Gait  Overall Level of Assistance: Contact-guard assistance; Additional time (Slow guarded steps.)  Assistive Device: Walker, rolling     ADL  Feeding: Modified independent ;Setup; Beverage management  Grooming: Setup; Increased time to complete;Stand by assistance (Oral care standing at sink.)  UE Bathing:  (wash face/hands standing at sink.)  LE Bathing: Setup; Increased time to complete;Stand by assistance  Additional Comments: Pt completed supine/sit transfer to seated EOB. Pt stated mild dizziness after transfer with symptoms subsiding after 1 minute. Sit/stand transfer using RW for static standing EOB. Functional mobility EOB/chair. Seated rest break needed before second sit/stand. Pt producing phlem, c/o mild nausea w/o emesis. Sit/stand transfer using RW from chair for static standing. Functional mobility chair/sink. Simple grooming completed standing at sink. Mild unsteadiness noted during standing dynamic activity. Pt exited bathroom to return to seated in chair. Pt set up with lunch tray on tray table in front of pt.  Per pt spouse, pt smell very sensitive unable to eat much d/t nausea. Activity Tolerance  Activity Tolerance: Patient tolerated treatment well  Bed mobility  Supine to Sit: Minimal assistance (Trunk support.)  Scooting: Contact guard assistance  Bed Mobility Comments: HOB elevated. Transfers  Sit to stand: Minimal assistance  Stand to sit: Contact guard assistance  Transfer Comments: Transfers completed x2. Verbal cues for hand placement with good return. Cognition  Overall Cognitive Status: WFL  Orientation  Overall Orientation Status: Within Functional Limits     Education Given To: Patient; Family  Education Provided Comments: OT POC, transfer/walker safety, importance of participation in therapy, EC/WS, fall prevention with good return. AM-PAC Score  AM-PAC Inpatient Daily Activity Raw Score: 19 (12/13/22 1520)  AM-PAC Inpatient ADL T-Scale Score : 40.22 (12/13/22 1520)  ADL Inpatient CMS 0-100% Score: 42.8 (12/13/22 1520)  ADL Inpatient CMS G-Code Modifier : CK (12/13/22 1520)    Goals  Short Term Goals  Time Frame for Short Term Goals: Patient will, by discharge  Short Term Goal 1: demo UB ADLs at Mod I  Short Term Goal 2: demo LB ADLs at SBA using AE PRN  Short Term Goal 3: demo bed mobility at SBA to engage in functional tasks  Short Term Goal 4: demo grooming/self-feeding at Mod I using AE PRN  Short Term Goal 5: demo 15+ min of dynamic sitting balance at SBA to engage in ADL tasks  Short Term Goal 6: demo functional transfers at [place] A using LRD to engage in ADLs       Therapy Time   Individual Concurrent Group Co-treatment   Time In 1125         Time Out 1222         Minutes 57         Timed Code Treatment Minutes: 57 Minutes    Pt supine in bed upon therapist arrival. Pleasant and agreeable to therapy. See above for LOF for all tasks. Pt retired to seated in chair at end of session with chair alarm activated and call light within reach.       ROSENDO Negron/SALEEM

## 2022-12-13 NOTE — PROGRESS NOTES
Physical Therapy  Facility/Department: [address] NEURO  Daily Treatment Note  NAME: Selma Vinson  : 1945  MRN: [id number]    Date of Service: 2022    Discharge Recommendations:  Patient would benefit from continued therapy after discharge   PT Equipment Recommendations  Equipment Needed:  (continue to assess--pt may need rwalker, but hopes to be DC'd w/o one)    Patient Diagnosis(es): There were no encounter diagnoses. Assessment   Pt more alert, improved tolerance to activity/mobility this date; frequent rest breaks needed to monitor O2 sats and to allow pt's sat to return to 90% or higher (tried on room air per pt request--RN notified). Lowest O2 sat 86% after ambulating 80' on room air; best O2 sat 97% seated on room air. Activity Tolerance: Patient tolerated treatment well  Equipment Needed:  (continue to assess--pt may need rwalker, but hopes to be DC'd w/o one)     Plan    Physcial Therapy Plan  General Plan: 6-7 times per week  Current Treatment Recommendations: Strengthening;ROM;Balance training;Gait training;Functional mobility training;Transfer training;Stair training;Home exercise program;Safety education & training;Patient/Caregiver education & training; Therapeutic activities; Equipment evaluation, education, & procurement  PT Plan of Care: Daily     Restrictions  Restrictions/Precautions  Restrictions/Precautions: Up as Tolerated  Required Braces or Orthoses?: No  Position Activity Restriction  Other position/activity restrictions: O2 NC 3L    Subjective    Subjective  Pain: no c/o--states no nausea throughout PT session  Orientation  Overall Orientation Status: Within Functional Limits  Cognition  Overall Cognitive Status: WFL     Objective   Vitals  O2 Device: Nasal cannula (3L)  Bed Mobility Training  Bed Mobility Training: No  Transfer Training  Transfer Training: Yes  Overall Level of Assistance: Minimum assistance;Supervision; Additional time (initially min A, progressed to SBA+1)  Interventions: Safety awareness training;Verbal cues  Sit to Stand: Minimum assistance;Supervision; Additional time  Stand to Sit: Minimum assistance;Supervision (pt had one episode of poor control when sitting requiring min A; otherwise just supervision)  Stand Pivot Transfers: Contact-guard assistance  Gait Training  Gait Training: Yes  Gait  Overall Level of Assistance: Contact-guard assistance; Additional time (Slow guarded steps.)  Interventions: Safety awareness training;Verbal cues  Speed/Dede: Slow  Gait Abnormalities:  (B hips/knees flexed)  Distance (ft): 120 Feet (x2; 60' x 1 w/o device and min A+1)  Assistive Device: Walker, rolling           Safety Devices  Type of Devices: Call light within reach; Chair alarm in place;Gait belt;Patient at risk for falls; Left in chair;Nurse notified  Restraints  Restraints Initially in Place: No       Goals  Short Term Goals  Time Frame for Short Term Goals: 14  Short Term Goal 1: Pt to perform bed mobility with supervision  Short Term Goal 2: Pt to demonstrate functional transfers CGA  Short Term Goal 3: Pt to ambulate 150ft w/ RW Otilia  Short Term Goal 4: Tolerate 30 minutes of therapy to demo increased endurance  Patient Goals   Patient Goals : To get stronger    Education  Patient Education  Education Given To: Patient; Family  Education Provided: Role of Therapy;Plan of Care;Transfer Training; Fall Prevention Strategies  Education Method: Verbal  Education Outcome: Verbalized understanding;Continued education needed    Therapy Time   Individual Concurrent Group Co-treatment   Time In 8181         Time Out 1400         Minutes 44         Timed Code Treatment Minutes: 2[address] Parisi, PT

## 2022-12-13 NOTE — PROGRESS NOTES
[facility]  Office: [address], DO, Mary Jane Gallo, DO, Gretchen Tate, DO, Brea Molina Blood, DO, Yunior Sánchez MD, Jose Francis MD, Romi Hdez MD, Dorota Sam MD,  Emery Donovan MD, Marlo Rubio MD, Kandi Villanueva, DO, Jennifer Daily MD,  Sj Amaya MD, Karina Carrillo MD, Agata Mckeon, DO, Gisella Roland MD, Vee Summers MD, Gisel Amezcua, DO, Carl Lozano MD, Kirsten Londono MD, Obi Casillas MD, Eliseo Mcclure MD, Mady Gallego, DO, Marcia Juarez MD, Juany Walker MD, Curt Becerra CNP,  Nils Marcum CNP, Susan Sosa CNP, Junior Addison, CNP,  [facility] Brian, [facility], Yoko Lee, CNP, Rosetta Nassar, CNP, Angelica Mcallister, CNP, Jacky Medellin, CNP, Dre De Luna, CNP, Apryl Gurrola PA-C, Jeaneth Barrera, CNS, Amaury Hu, CNP, Eyad Lion, [address] [place] [address]    Progress Note    12/13/2022    3:10 PM    Name:   Brit Jolley  MRN:     [id number]     [place]:      [de-identified]   Room:   [address] [place] [address] Day:  14  Admit Date:  11/29/2022 12:20 PM    PCP:   Gus Parker  Code Status:  Full Code    Subjective:     C/C: No chief complaint on file. Interval History Status: not changed. Spoke with patient and wife this morning. Patient still debilitated and only walked 40 feet with moderate assistance, discussed that this is unlikely to be successful at home. Also discussed testing for elevated BNP. patient previously was on diuretics    Brief History:     From H&P  Patient was life flighted from [facility] for rectal bleed at [facility]. Patient has underlying history of CAD with CABG 2 months before, A. fib on Coumadin, diabetes mellitus, hypertension. Patient had EGD x2 at [facility] facility and did not show any active bleed from duodenal ulcer. Patient had severe anemia 6.8.  Patient was intubated secondary to respiratory failure and transferred to [place]. Patient was given multiple transfusion. Coagulopathy from Coumadin was reversed with FFP, cryo, Kcentra, vitamin K. Patient underwent EGD on 11/28/2022 and showed duodenitis, nonbleeding duodenal ulcer with a visible vessel that was cauterized with bipolar probe. Patient was treated with prolonged Protonix infusion and hold anticoagulants and antiplatelet medications were held. Patient also required pressor support and fluid resuscitation in ICU. Patient was successfully extubated and treated with NIV postextubation. Patient continued to have tarry stools. Aspirin was resumed due to recent CAD and CABG after GI clearance on 12/6/2022. Patient also has history of A. fib and has been having rapid ventricular response. He was also treated with amiodarone. Review of Systems:     Review of Systems   Constitutional:  Negative for activity change, chills, diaphoresis, fatigue and fever. HENT:  Negative for congestion. Respiratory:  Negative for apnea, cough, shortness of breath and stridor. Cardiovascular:  Negative for chest pain, palpitations and leg swelling. Gastrointestinal:  Negative for diarrhea, nausea and vomiting. Endocrine: Negative for polydipsia, polyphagia and polyuria. Genitourinary:  Negative for dysuria and frequency. Musculoskeletal:  Negative for arthralgias, back pain, gait problem, joint swelling and neck pain. Neurological:  Negative for tremors, syncope, weakness, numbness and headaches. Psychiatric/Behavioral:  Negative for agitation. Medications: Allergies:     Allergies   Allergen Reactions    Atorvastatin Hives       Current Meds:   Scheduled Meds:    insulin glargine  10 Units SubCUTAneous BID    furosemide  40 mg IntraVENous Once    potassium chloride  20 mEq Oral BID     cefTRIAXone (ROCEPHIN) IV  1,000 mg IntraVENous Q24H    famotidine  20 mg Oral BID    insulin lispro  0-16 Units SubCUTAneous TID     insulin lispro  0-4 Units SubCUTAneous Nightly    pantoprazole  40 mg Oral BID AC    sucralfate  1 g Oral 4x Daily AC & HS    amiodarone  200 mg Oral Daily    metoprolol tartrate  25 mg Oral BID    ferrous sulfate  325 mg Oral BID WC    escitalopram  10 mg Oral Daily    ezetimibe  10 mg Oral Nightly    tamsulosin  0.4 mg Oral Daily    heparin (porcine)  5,000 Units SubCUTAneous 3 times per day    dextrose  25 g IntraVENous Once    neomycin-bacitracin-polymyxin   Topical BID    aspirin  81 mg Oral Daily    sodium chloride flush  5-40 mL IntraVENous 2 times per day     Continuous Infusions:    dextrose      sodium chloride Stopped (22 0512)     PRN Meds: metoclopramide, glucose, dextrose bolus **OR** dextrose bolus, glucagon (rDNA), dextrose, sodium chloride flush, sodium chloride, ondansetron **OR** ondansetron, polyethylene glycol, acetaminophen **OR** acetaminophen, sodium phosphate IVPB **OR** sodium phosphate IVPB **OR** sodium phosphate IVPB, magnesium sulfate, potassium chloride **OR** potassium chloride    Data:     Past Medical History:   has a past medical history of A-fib ([facility] [address] CAD (coronary artery disease), Depression, DM2 (diabetes mellitus, type 2) ([facility] [address] and HTN (hypertension). Social History:   reports that he has quit smoking. His smoking use included cigarettes. He has never used smokeless tobacco. He reports that he does not currently use alcohol. He reports that he does not use drugs.      Family History:   Family History   Problem Relation Age of Onset    Cancer Mother     Cancer Father     Heart Disease Brother        Vitals:  BP (!) 135/59   Pulse 66   Temp 98.5 °F (36.9 °C) (Temporal)   Resp 12   Ht 6' 2\" (1.88 m)   Wt 186 lb 4.6 oz (84.5 kg)   SpO2 96%   BMI 23.92 kg/m²   Temp (24hrs), Av.8 °F (37.1 °C), Min:98.4 °F (36.9 °C), Max:99.7 °F (37.6 °C)    Recent Labs     22  0626 22  0801 22  1110   POCGLU 183* 97 98 109       I/O (24Hr): Intake/Output Summary (Last 24 hours) at 12/13/2022 1510  Last data filed at 12/13/2022 1000  Gross per 24 hour   Intake 710 ml   Output 200 ml   Net 510 ml       Labs:  Hematology:  Recent Labs     12/11/22  0332 12/11/22  1843 12/12/22  0624 12/13/22  0423   WBC 11.5*  --  20.4* 18.2*   RBC 2.91*  --  3.00* 2.35*   HGB 8.4* 8.5* 8.8* 7.6*   HCT 26.4* 29.1* 26.5* 21.7*   MCV 90.7  --  88.3 92.3   MCH 28.9  --  29.3 32.3   MCHC 31.8  --  33.2 35.0*   RDW 15.9*  --  16.2* 17.2*     --  384 750*   MPV 10.1  --  10.5 10.8     Chemistry:  Recent Labs     12/12/22 2127 12/13/22  0423   K 3.2*  --    MG 1.9  --    PROBNP  --  12,790*     Recent Labs     12/12/22  0821 12/12/22  0901 12/12/22  1130 12/12/22  1536 12/12/22 2012 12/13/22  0626 12/13/22  0801 12/13/22  1110   PROT 5.5*  --   --   --   --   --   --   --    LABALBU 2.5*  --   --   --   --   --   --   --    AST 23  --   --   --   --   --   --   --    ALT 16  --   --   --   --   --   --   --    ALKPHOS 128  --   --   --   --   --   --   --    BILITOT 0.3  --   --   --   --   --   --   --    BILIDIR 0.1  --   --   --   --   --   --   --    POCGLU  --    < > 158* 135* 183* 97 98 109    < > = values in this interval not displayed. ABG:  Lab Results   Component Value Date/Time    POCPH 7.469 12/06/2022 10:06 AM    POCPCO2 34.8 12/06/2022 10:06 AM    POCPO2 82.2 12/06/2022 10:06 AM    POCHCO3 25.3 12/06/2022 10:06 AM    NBEA 5 12/04/2022 04:34 AM    PBEA 2 12/06/2022 10:06 AM    LUQY6IGN 97 12/06/2022 10:06 AM    FIO2 30.0 12/05/2022 04:31 AM     Lab Results   Component Value Date/Time    SPECIAL RT ACUB 9ML 12/03/2022 03:35 PM     Lab Results   Component Value Date/Time    CULTURE GRAM NEGATIVE RODS >686332 CFU/ML (A) 12/12/2022 07:45 AM       Radiology:  CT ABDOMEN PELVIS WO CONTRAST Additional Contrast? None    Result Date: 12/12/2022  1. Mild diffuse bladder wall thickening raises the possibility of cystitis.  2.  At least small pleural effusions, left greater than right, with findings suggestive of interstitial edema. 3.  Recently administered oral contrast is present throughout the colon to the rectum. No bowel dilatation or wall thickening appreciated. XR CHEST (SINGLE VIEW FRONTAL)    Result Date: 12/13/2022  Diffuse prominence of the pulmonary interstitium which can be seen with interstitial pulmonary edema as well as atypical/viral infection. CT HEAD WO CONTRAST    Result Date: 12/9/2022  No acute intracranial abnormality. XR CHEST PORTABLE    Result Date: 12/12/2022  Mild bibasilar airspace opacities right greater than left. This could represent atelectasis or pneumonia in the appropriate clinical setting. XR CHEST PORTABLE    Result Date: 12/9/2022  Increasing vascular congestion and interstitial opacities suggestive of developing edema. XR CHEST PORTABLE    Result Date: 12/8/2022  Removal of the endotracheal and gastric tubes. Improved bibasilar airspace opacities. FL MODIFIED BARIUM SWALLOW W VIDEO    Result Date: 12/11/2022  1. Penetration without aspiration with the thin liquid substance by straw and 2 attempts. 2. No penetration or aspiration with the remainder of the administered substances. Please see separate speech pathology report for full discussion of findings and recommendations. Physical Examination:        Physical Exam  Constitutional:       General: He is not in acute distress. Appearance: He is not ill-appearing, toxic-appearing or diaphoretic. HENT:      Head: Normocephalic and atraumatic. Mouth/Throat:      Mouth: Mucous membranes are moist.      Pharynx: Oropharynx is clear. Eyes:      Pupils: Pupils are equal, round, and reactive to light. Neck:      Vascular: No carotid bruit. Cardiovascular:      Rate and Rhythm: Normal rate and regular rhythm. Heart sounds: No murmur heard. No friction rub. No gallop. Pulmonary:      Effort: No respiratory distress. Breath sounds: No stridor. No wheezing or rhonchi. Abdominal:      General: There is no distension. Palpations: There is no mass. Tenderness: There is no abdominal tenderness. Hernia: No hernia is present. Musculoskeletal:         General: No swelling, tenderness, deformity or signs of injury. Cervical back: No rigidity or tenderness. Lymphadenopathy:      Cervical: No cervical adenopathy. Skin:     Coloration: Skin is not jaundiced or pale. Findings: No bruising or erythema. Neurological:      General: No focal deficit present. Mental Status: Mental status is at baseline. Cranial Nerves: No cranial nerve deficit. Sensory: No sensory deficit. Motor: No weakness. Coordination: Coordination normal.   Psychiatric:         Mood and Affect: Mood normal.       Assessment:        Hospital Problems             Last Modified POA    * (Principal) Upper GI bleed 12/13/2022 Yes    Atrial fibrillation with rapid ventricular response ([address] 12/8/2022 Yes    Essential hypertension 12/7/2022 Yes    S/P CABG (coronary artery bypass graft) 12/7/2022 Yes    Anemia due to blood loss, acute 12/7/2022 Yes    Type 2 diabetes mellitus with hyperglycemia, with long-term current use of insulin ([address] 12/8/2022 Yes    Hemorrhagic shock ([address] 12/13/2022 Yes    Orthostatic dizziness 12/8/2022 Yes    Lethargy 12/8/2022 Yes    Demand ischemia ([address] 12/8/2022 Yes    CHF exacerbation ([address] 12/13/2022 Yes    Acute respiratory failure with hypoxia ([address] 12/13/2022 Yes       Plan:        Upper GI bleed due to duodenal ulcer and duodenitis -underwent EGD with epinephrine injection into the ulcer. Treated with Protonix infusion currently on 40 mg orally twice daily dose, Pepcid, and Carafate. Stable  Acute Hypoxic respiratory failure - suspect pulmonary edema, give lasix 40mg IV once, BNP 12,000  CAD s/p CABG on 10/25/2022 -continue aspirin. Monitor for GI bleed.   Paroxysmal atrial fibrillation -Coumadin on hold due to duodenal ulcer. Continue amiodarone. Outpatient follow-up with GI for clearance for anticoagulation. Essential hypertension -low-dose beta-blocker with parameters  Type 2 diabetes mellitus-lantus 10 units BID until diet improves    Hypovolemic shock -resolved   Urinary retention -on Flomax. Removed Carrillo catheter. Check postvoid residual.  Anemia due to acute blood loss -iron supplement. Delirium - appears appropriate today   Worsening leucocytosis - stable  Intractable nausea - improved   Dispo: discussed with wife and  at bedside, still weak and may need placement. Discussed with CM. Elevated BNP with pulmonary vascular congestion.  Give lasix 40mg IV once today     Amanda Worthy,   12/13/2022  3:10 PM

## 2022-12-13 NOTE — PLAN OF CARE
Problem: Discharge Planning  Goal: Discharge to home or other facility with appropriate resources  Outcome: Progressing     Problem: Confusion  Goal: Confusion, delirium, dementia, or psychosis is improved or at baseline  Description: INTERVENTIONS:  1. Assess for possible contributors to thought disturbance, including medications, impaired vision or hearing, underlying metabolic abnormalities, dehydration, psychiatric diagnoses, and notify attending LIP  2. [place] high risk fall precautions, as indicated  3. Provide frequent short contacts to provide reality reorientation, refocusing and direction  4. Decrease environmental stimuli, including noise as appropriate  5. Monitor and intervene to maintain adequate nutrition, hydration, elimination, sleep and activity  6. If unable to ensure safety without constant attention obtain sitter and review sitter guidelines with assigned personnel  7.  Initiate Psychosocial CNS and Spiritual Care consult, as indicated  Outcome: Progressing     Problem: Pain  Goal: Verbalizes/displays adequate comfort level or baseline comfort level  Outcome: Progressing     Problem: Cardiovascular - Adult  Goal: Maintains optimal cardiac output and hemodynamic stability  Outcome: Progressing  Goal: Absence of cardiac dysrhythmias or at baseline  Outcome: Progressing     Problem: Gastrointestinal - Adult  Goal: Minimal or absence of nausea and vomiting  Outcome: Progressing  Goal: Maintains or returns to baseline bowel function  Outcome: Progressing  Goal: Maintains adequate nutritional intake  Outcome: Progressing  Goal: Establish and maintain optimal ostomy function  Outcome: Progressing     Problem: Respiratory - Adult  Goal: Achieves optimal ventilation and oxygenation  Outcome: Progressing     Problem: Neurosensory - Adult  Goal: Achieves stable or improved neurological status  Outcome: Progressing  Goal: Absence of seizures  Outcome: Progressing  Goal: Remains free of injury related to seizures activity  Outcome: Progressing  Goal: Achieves maximal functionality and self care  Outcome: Progressing     Problem: Skin/Tissue Integrity - Adult  Goal: Skin integrity remains intact  Outcome: Progressing  Goal: Incisions, wounds, or drain sites healing without S/S of infection  Outcome: Progressing  Goal: Oral mucous membranes remain intact  Outcome: Progressing     Problem: Nutrition Deficit:  Goal: Optimize nutritional status  Outcome: Progressing     Problem: Skin/Tissue Integrity  Goal: Absence of new skin breakdown  Description: 1. Monitor for areas of redness and/or skin breakdown  2. Assess vascular access sites hourly  3. Every 4-6 hours minimum:  Change oxygen saturation probe site  4. Every 4-6 hours:  If on nasal continuous positive airway pressure, respiratory therapy assess nares and determine need for appliance change or resting period.   Outcome: Progressing     Problem: Safety - Adult  Goal: Free from fall injury  Outcome: Progressing     Problem: ABCDS Injury Assessment  Goal: Absence of physical injury  Outcome: Progressing     Problem: Chronic Conditions and Co-morbidities  Goal: Patient's chronic conditions and co-morbidity symptoms are monitored and maintained or improved  Outcome: Progressing

## 2022-12-14 VITALS
HEIGHT: 74 IN | SYSTOLIC BLOOD PRESSURE: 142 MMHG | RESPIRATION RATE: 13 BRPM | HEART RATE: 62 BPM | DIASTOLIC BLOOD PRESSURE: 57 MMHG | BODY MASS INDEX: 23.91 KG/M2 | OXYGEN SATURATION: 100 % | WEIGHT: 186.29 LBS | TEMPERATURE: 97.6 F

## 2022-12-14 PROBLEM — K92.0 GASTROINTESTINAL HEMORRHAGE WITH HEMATEMESIS: Status: ACTIVE | Noted: 2022-11-29

## 2022-12-14 LAB
ALBUMIN SERPL-MCNC: 2.4 G/DL (ref 3.5–5.2)
ALBUMIN SERPL-MCNC: 2.8 G/DL (ref 3.5–5.2)
ANION GAP SERPL CALCULATED.3IONS-SCNC: 14 MMOL/L (ref 9–17)
ANION GAP SERPL CALCULATED.3IONS-SCNC: 7 MMOL/L (ref 9–17)
BUN BLDV-MCNC: 13 MG/DL (ref 8–23)
BUN BLDV-MCNC: 15 MG/DL (ref 8–23)
CALCIUM SERPL-MCNC: 7.8 MG/DL (ref 8.6–10.4)
CALCIUM SERPL-MCNC: 8.2 MG/DL (ref 8.6–10.4)
CHLORIDE BLD-SCNC: 91 MMOL/L (ref 98–107)
CHLORIDE BLD-SCNC: 96 MMOL/L (ref 98–107)
CO2: 27 MMOL/L (ref 20–31)
CO2: 32 MMOL/L (ref 20–31)
CREAT SERPL-MCNC: 1.06 MG/DL (ref 0.7–1.2)
CREAT SERPL-MCNC: 1.1 MG/DL (ref 0.7–1.2)
EKG Q-T INTERVAL: 334 MS
EKG QRS DURATION: 136 MS
EKG QTC CALCULATION (BAZETT): 515 MS
EKG R AXIS: 40 DEGREES
EKG T AXIS: -138 DEGREES
EKG VENTRICULAR RATE: 143 BPM
GFR SERPL CREATININE-BSD FRML MDRD: >60 ML/MIN/1.73M2
GFR SERPL CREATININE-BSD FRML MDRD: >60 ML/MIN/1.73M2
GLUCOSE BLD-MCNC: 109 MG/DL (ref 70–99)
GLUCOSE BLD-MCNC: 112 MG/DL (ref 75–110)
GLUCOSE BLD-MCNC: 78 MG/DL (ref 75–110)
GLUCOSE BLD-MCNC: 83 MG/DL (ref 75–110)
GLUCOSE BLD-MCNC: 87 MG/DL (ref 70–99)
GLUCOSE BLD-MCNC: 90 MG/DL (ref 75–110)
GLUCOSE BLD-MCNC: 98 MG/DL (ref 75–110)
HCT VFR BLD CALC: 24.5 % (ref 40.7–50.3)
HEMOGLOBIN: 7.8 G/DL (ref 13–17)
MAGNESIUM: 1.9 MG/DL (ref 1.6–2.6)
MCH RBC QN AUTO: 28.8 PG (ref 25.2–33.5)
MCHC RBC AUTO-ENTMCNC: 31.8 G/DL (ref 28.4–34.8)
MCV RBC AUTO: 90.4 FL (ref 82.6–102.9)
NRBC AUTOMATED: 0 PER 100 WBC
PDW BLD-RTO: 15.9 % (ref 11.8–14.4)
PHOSPHORUS: 2.5 MG/DL (ref 2.5–4.5)
PHOSPHORUS: 2.9 MG/DL (ref 2.5–4.5)
PLATELET # BLD: 468 K/UL (ref 138–453)
PMV BLD AUTO: 9.9 FL (ref 8.1–13.5)
POTASSIUM SERPL-SCNC: 2.8 MMOL/L (ref 3.7–5.3)
POTASSIUM SERPL-SCNC: 3.4 MMOL/L (ref 3.7–5.3)
RBC # BLD: 2.71 M/UL (ref 4.21–5.77)
REASON FOR REJECTION: NORMAL
SODIUM BLD-SCNC: 132 MMOL/L (ref 135–144)
SODIUM BLD-SCNC: 135 MMOL/L (ref 135–144)
WBC # BLD: 9.9 K/UL (ref 3.5–11.3)
ZZ NTE CLEAN UP: ORDERED TEST: NORMAL
ZZ NTE WITH NAME CLEAN UP: SPECIMEN SOURCE: NORMAL

## 2022-12-14 PROCEDURE — 6360000002 HC RX W HCPCS: Performed by: FAMILY MEDICINE

## 2022-12-14 PROCEDURE — 83735 ASSAY OF MAGNESIUM: CPT

## 2022-12-14 PROCEDURE — 82947 ASSAY GLUCOSE BLOOD QUANT: CPT

## 2022-12-14 PROCEDURE — 6370000000 HC RX 637 (ALT 250 FOR IP): Performed by: INTERNAL MEDICINE

## 2022-12-14 PROCEDURE — 6370000000 HC RX 637 (ALT 250 FOR IP): Performed by: NURSE PRACTITIONER

## 2022-12-14 PROCEDURE — 6370000000 HC RX 637 (ALT 250 FOR IP): Performed by: FAMILY MEDICINE

## 2022-12-14 PROCEDURE — 2580000003 HC RX 258: Performed by: STUDENT IN AN ORGANIZED HEALTH CARE EDUCATION/TRAINING PROGRAM

## 2022-12-14 PROCEDURE — 99239 HOSP IP/OBS DSCHRG MGMT >30: CPT | Performed by: INTERNAL MEDICINE

## 2022-12-14 PROCEDURE — 2580000003 HC RX 258: Performed by: FAMILY MEDICINE

## 2022-12-14 PROCEDURE — 99223 1ST HOSP IP/OBS HIGH 75: CPT | Performed by: PHYSICAL MEDICINE & REHABILITATION

## 2022-12-14 PROCEDURE — 97530 THERAPEUTIC ACTIVITIES: CPT

## 2022-12-14 PROCEDURE — 85027 COMPLETE CBC AUTOMATED: CPT

## 2022-12-14 PROCEDURE — 80069 RENAL FUNCTION PANEL: CPT

## 2022-12-14 PROCEDURE — 6360000002 HC RX W HCPCS: Performed by: INTERNAL MEDICINE

## 2022-12-14 PROCEDURE — 36415 COLL VENOUS BLD VENIPUNCTURE: CPT

## 2022-12-14 PROCEDURE — 6360000002 HC RX W HCPCS

## 2022-12-14 PROCEDURE — 6360000002 HC RX W HCPCS: Performed by: STUDENT IN AN ORGANIZED HEALTH CARE EDUCATION/TRAINING PROGRAM

## 2022-12-14 RX ORDER — CEFDINIR 300 MG/1
300 CAPSULE ORAL 2 TIMES DAILY
Qty: 10 CAPSULE | Refills: 0 | Status: SHIPPED | OUTPATIENT
Start: 2022-12-14 | End: 2022-12-19

## 2022-12-14 RX ORDER — LOSARTAN POTASSIUM 50 MG/1
50 TABLET ORAL DAILY
Status: DISCONTINUED | OUTPATIENT
Start: 2022-12-14 | End: 2022-12-14

## 2022-12-14 RX ORDER — SUCRALFATE 1 G/1
1 TABLET ORAL
Qty: 120 TABLET | Refills: 3 | Status: SHIPPED | OUTPATIENT
Start: 2022-12-14

## 2022-12-14 RX ORDER — LANOLIN ALCOHOL/MO/W.PET/CERES
325 CREAM (GRAM) TOPICAL 2 TIMES DAILY WITH MEALS
Qty: 90 TABLET | Refills: 3 | Status: SHIPPED | OUTPATIENT
Start: 2022-12-14

## 2022-12-14 RX ORDER — AMIODARONE HYDROCHLORIDE 200 MG/1
200 TABLET ORAL DAILY
Qty: 30 TABLET | Refills: 0 | Status: SHIPPED | OUTPATIENT
Start: 2022-12-15 | End: 2023-01-14

## 2022-12-14 RX ORDER — POTASSIUM CHLORIDE 20 MEQ/1
20 TABLET, EXTENDED RELEASE ORAL 2 TIMES DAILY
Qty: 60 TABLET | Refills: 0 | Status: SHIPPED | OUTPATIENT
Start: 2022-12-14 | End: 2023-01-13

## 2022-12-14 RX ORDER — AMLODIPINE BESYLATE 5 MG/1
5 TABLET ORAL DAILY
Status: DISCONTINUED | OUTPATIENT
Start: 2022-12-14 | End: 2022-12-14

## 2022-12-14 RX ORDER — EZETIMIBE 10 MG/1
10 TABLET ORAL NIGHTLY
Qty: 30 TABLET | Refills: 3 | Status: SHIPPED | OUTPATIENT
Start: 2022-12-14

## 2022-12-14 RX ORDER — FUROSEMIDE 10 MG/ML
40 INJECTION INTRAMUSCULAR; INTRAVENOUS ONCE
Status: COMPLETED | OUTPATIENT
Start: 2022-12-14 | End: 2022-12-14

## 2022-12-14 RX ORDER — POTASSIUM CHLORIDE 7.45 MG/ML
10 INJECTION INTRAVENOUS
Status: COMPLETED | OUTPATIENT
Start: 2022-12-14 | End: 2022-12-14

## 2022-12-14 RX ORDER — TAMSULOSIN HYDROCHLORIDE 0.4 MG/1
0.4 CAPSULE ORAL DAILY
Qty: 30 CAPSULE | Refills: 3 | Status: SHIPPED | OUTPATIENT
Start: 2022-12-15

## 2022-12-14 RX ORDER — FUROSEMIDE 40 MG/1
120 TABLET ORAL DAILY
Qty: 60 TABLET | Refills: 3 | Status: SHIPPED | OUTPATIENT
Start: 2022-12-14 | End: 2023-12-14

## 2022-12-14 RX ORDER — POTASSIUM CHLORIDE 20 MEQ/1
40 TABLET, EXTENDED RELEASE ORAL 2 TIMES DAILY WITH MEALS
Status: DISCONTINUED | OUTPATIENT
Start: 2022-12-14 | End: 2022-12-14 | Stop reason: HOSPADM

## 2022-12-14 RX ADMIN — HEPARIN SODIUM 5000 UNITS: 5000 INJECTION INTRAVENOUS; SUBCUTANEOUS at 14:43

## 2022-12-14 RX ADMIN — METOCLOPRAMIDE 10 MG: 5 INJECTION, SOLUTION INTRAMUSCULAR; INTRAVENOUS at 08:09

## 2022-12-14 RX ADMIN — PANTOPRAZOLE SODIUM 40 MG: 40 TABLET, DELAYED RELEASE ORAL at 14:43

## 2022-12-14 RX ADMIN — METOPROLOL TARTRATE 25 MG: 25 TABLET ORAL at 08:06

## 2022-12-14 RX ADMIN — POTASSIUM CHLORIDE 40 MEQ: 1500 TABLET, EXTENDED RELEASE ORAL at 08:07

## 2022-12-14 RX ADMIN — INSULIN GLARGINE 10 UNITS: 100 INJECTION, SOLUTION SUBCUTANEOUS at 08:10

## 2022-12-14 RX ADMIN — ESCITALOPRAM OXALATE 10 MG: 10 TABLET ORAL at 08:07

## 2022-12-14 RX ADMIN — FUROSEMIDE 40 MG: 10 INJECTION, SOLUTION INTRAMUSCULAR; INTRAVENOUS at 08:06

## 2022-12-14 RX ADMIN — HEPARIN SODIUM 5000 UNITS: 5000 INJECTION INTRAVENOUS; SUBCUTANEOUS at 06:05

## 2022-12-14 RX ADMIN — PANTOPRAZOLE SODIUM 40 MG: 40 TABLET, DELAYED RELEASE ORAL at 08:07

## 2022-12-14 RX ADMIN — SUCRALFATE 1 G: 1 TABLET ORAL at 08:07

## 2022-12-14 RX ADMIN — TAMSULOSIN HYDROCHLORIDE 0.4 MG: 0.4 CAPSULE ORAL at 08:06

## 2022-12-14 RX ADMIN — POTASSIUM CHLORIDE 10 MEQ: 7.46 INJECTION, SOLUTION INTRAVENOUS at 06:09

## 2022-12-14 RX ADMIN — METOCLOPRAMIDE 10 MG: 5 INJECTION, SOLUTION INTRAMUSCULAR; INTRAVENOUS at 01:37

## 2022-12-14 RX ADMIN — POLYMYXIN B SULFATE, BACITRACIN ZINC, NEOMYCIN SULFATE: 5000; 3.5; 4 OINTMENT TOPICAL at 08:08

## 2022-12-14 RX ADMIN — POTASSIUM CHLORIDE 10 MEQ: 7.46 INJECTION, SOLUTION INTRAVENOUS at 04:58

## 2022-12-14 RX ADMIN — FAMOTIDINE 20 MG: 20 TABLET, FILM COATED ORAL at 08:07

## 2022-12-14 RX ADMIN — AMIODARONE HYDROCHLORIDE 200 MG: 200 TABLET ORAL at 08:07

## 2022-12-14 RX ADMIN — CEFTRIAXONE SODIUM 1000 MG: 10 INJECTION, POWDER, FOR SOLUTION INTRAVENOUS at 14:43

## 2022-12-14 RX ADMIN — ASPIRIN 81 MG: 81 TABLET, CHEWABLE ORAL at 08:07

## 2022-12-14 RX ADMIN — SUCRALFATE 1 G: 1 TABLET ORAL at 11:57

## 2022-12-14 RX ADMIN — FERROUS SULFATE TAB EC 325 MG (65 MG FE EQUIVALENT) 325 MG: 325 (65 FE) TABLET DELAYED RESPONSE at 08:07

## 2022-12-14 RX ADMIN — POTASSIUM CHLORIDE 10 MEQ: 7.46 INJECTION, SOLUTION INTRAVENOUS at 07:19

## 2022-12-14 RX ADMIN — SODIUM CHLORIDE, PRESERVATIVE FREE 10 ML: 5 INJECTION INTRAVENOUS at 08:08

## 2022-12-14 ASSESSMENT — PAIN SCALES - GENERAL: PAINLEVEL_OUTOF10: 0

## 2022-12-14 NOTE — PROGRESS NOTES
Congestive Heart Failure Education completed and charted. CHF booklet given. Patient was receptive to education. Discussed the  importance of medication compliance. Discussed the importance of a heart healthy diet. Discussed 2000 mg sodium-restricted daily diet. Patient instructed to limit fluid intake to  1.5 to 2 liters per day. Patient instructed to weigh self at the same time of each day each morning, reinforced teaching to monitor for 3-5 lb weight increase over 1-2 days notify physician if change noted. Signs and symptoms of CHF discussed with patient, such as feeling more tired than normal, feeling short of breath, coughing that increases when lying down, sudden weight gain, swelling of the feet, legs or belly. Patient verbalized understanding to notify physician office if these symptoms occur.   EF 40-45%   Pt follows with [facility]

## 2022-12-14 NOTE — CARE COORDINATION
Spoke to patient and his wife at bedside. The have decided to go home with [facility]. (Previously accepted). THey will need a home O2 evaluation and scripts for walker and Oxygen if needed. I have informed Dr. Neel Martinez of plan. Awaiting O2 evaluation. 1505 Oxygen is not needed for the patient. [address] faxed to [facility]. Provided patient with their phone number to call once they arrive home.    Called Lydia from [facility] and informed her of the discharge

## 2022-12-14 NOTE — PROGRESS NOTES
SPIRITUAL CARE DEPARTMENT - [address]  PROGRESS NOTE    Shift date: 12/14/2022  Shift day: Wednesday   Shift # 1    Room # 0108/0108-01   Name: Krishna Patton                Adventist: Unknown   Place of Faith: Unknown    Referral: Routine Visit    Admit Date & Time: 11/29/2022 12:20 PM    Assessment:  Krishna Patton is a 68 y.o. male in the hospital because gastrointestinal hemorrhage with hematemesis. Upon entering the room writer observes patient is sitting in the chair. A daughter is sitting in a chair next to him. Clothes are bagged sitting on the bed. Intervention:  Writer introduced self and title as  Writer offered space for patient and family  to express feelings, needs, and concerns and provided a ministry presence. Patient says it is a good day, because he is getting to go home. Patient says he is tired and is looking forward to be able to rest. He says that getting rest in the hospital is difficult. Patient says he doesn't have any need for the  today. Outcome:  Patient thanks  for stopping by and checking on him. Plan:  Chaplains will remain available to offer spiritual and emotional support as needed. Electronically signed by Donne Spurling Resident, on 12/14/2022 at 2:45 PM.  [facility] [place]  [phone]       12/14/22 1444   Encounter Summary   Service Provided For: Patient; Family   Referral/Consult From: [facility]   Last Encounter  12/14/22   Complexity of Encounter Moderate   Begin Time 1335   End Time  1337   Total Time Calculated 2 min   Assessment/Intervention/Outcome   Assessment Calm;Coping   Intervention Active listening;Sustaining Presence/Ministry of presence   Outcome Connection/Belonging;Coping;Expressed Gratitude

## 2022-12-14 NOTE — CONSULTS
Physical Medicine & Rehabilitation  Consult Note      Admitting Physician: Kandi Villanueva DO    Primary Care Provider: Gus Parker     Reason for Consult:  Acute Inpatient Rehabilitation    Chief Complaint: GI bleed    History of Present Illness:  Referring Provider is requesting an evaluation for appropriate placement upon discharge from acute care. Mr. Brit Jolley is a 68 y.o. male who was admitted to [facility] on 11/29/2022 with No chief complaint on file. 77-year-old male with history of coronary disease status post CABG in October 2022, atrial fibrillation, on Coumadin, diabetes, hypertension, hyperlipidemia, depression presented with painless rectal bleeding with anemia received 2 units of packed red blood cells and fresh frozen plasma and cryoprecipitate as well as vitamin K INR was 3.09. Had EGD for to have a duodenal ulcer and duodenitis with no active bleeding. Colonoscopy report no source of bleeding.   Patient with multiple episodes of hematemesis patient was intubated and transferred Sophiastad likely hypovolemic post CABG x2 in October 2022, newly diagnosed A. fib in November 2022 was on Coumadin and amiodarone continue amiodarone and Lopressor hold her anticoagulation consider restarting when hemoglobin stable-noted GI indicates ulcer high risk for rebleed,    Critical care post Lasix-patient extubated 12/5.3 with BiPAP    GI-12/1 GI bleed secondary to melena secondary duodenal ulcer complete Protonix twice daily for 8 weeks then once daily if on anticoagulation, also at high risk for rebleed anticoagulation 3 days post procedure resume with caution    Internal medicine acute hypoxic respiratory failure pulmonary edema on IV Lasix paroxysmal A. fib continue amiodarone outpatient follow-up with GI for clearance for anticoagulation, type 2 diabetes on Lantus until diet improves delirium improved    Radiology:  CT ABDOMEN PELVIS WO CONTRAST Additional Contrast? None    Result Date: 12/12/2022  1. Mild diffuse bladder wall thickening raises the possibility of cystitis. 2.  At least small pleural effusions, left greater than right, with findings suggestive of interstitial edema. 3.  Recently administered oral contrast is present throughout the colon to the rectum. No bowel dilatation or wall thickening appreciated. XR CHEST (SINGLE VIEW FRONTAL)    Result Date: 12/13/2022  Diffuse prominence of the pulmonary interstitium which can be seen with interstitial pulmonary edema as well as atypical/viral infection. CT HEAD WO CONTRAST    Result Date: 12/9/2022  No acute intracranial abnormality. XR CHEST PORTABLE    Result Date: 12/12/2022  Mild bibasilar airspace opacities right greater than left. This could represent atelectasis or pneumonia in the appropriate clinical setting. XR CHEST PORTABLE    Result Date: 12/9/2022  Increasing vascular congestion and interstitial opacities suggestive of developing edema. XR CHEST PORTABLE    Result Date: 12/8/2022  Removal of the endotracheal and gastric tubes. Improved bibasilar airspace opacities. FL MODIFIED BARIUM SWALLOW W VIDEO    Result Date: 12/11/2022  1. Penetration without aspiration with the thin liquid substance by straw and 2 attempts. 2. No penetration or aspiration with the remainder of the administered substances. Please see separate speech pathology report for full discussion of findings and recommendations.        Review of Systems:  Constitutional: negative for anorexia, chills, fatigue, fevers, sweats and weight loss  Eyes: negative for redness and visual disturbance  Ears, nose, mouth, throat, and face: negative for earaches, sore throat and tinnitus  Respiratory: negative for cough and shortness of breath  Cardiovascular: negative for chest pain, dyspnea and palpitations  Gastrointestinal: negative for abdominal pain, change in bowel habits, constipation, nausea and vomiting  Genitourinary:negative for dysuria, frequency, hesitancy and urinary incontinence  Integument/breast: negative for pruritus and rash  Musculoskeletal:negative for muscle weakness and stiff joints  Neurological: negative for dizziness, headaches and weakness  Behavioral/Psych: negative for decreased appetite, depression and fatigue    Functional History:  PTA: Independent with all activities. Current:  PT:    Bed Mobility Training  Bed Mobility Training: No  Overall Level of Assistance: Moderate assistance, Assist X1, Additional time  Interventions: Safety awareness training, Manual cues, Visual cues, Demonstration, Weight shifting training/pressure relief  Rolling: Moderate assistance, Assist X1, Additional time  Supine to Sit: Moderate assistance, Assist X1, Additional time  Sit to Supine: Assist X1, Moderate assistance, Additional time  Scooting: Moderate assistance, Assist X1, Additional time  Restrictions/Precautions  Restrictions/Precautions: Up as Tolerated  Required Braces or Orthoses?: No  Position Activity Restriction  Other position/activity restrictions: O2 NC 2 lm. Transfer Training  Transfer Training: Yes  Overall Level of Assistance: Minimum assistance, Supervision, Additional time (initially min A, progressed to SBA+1)  Interventions: Safety awareness training, Verbal cues  Sit to Stand: Minimum assistance, Supervision, Additional time  Stand to Sit: Minimum assistance, Supervision (pt had one episode of poor control when sitting requiring min A; otherwise just supervision)  Stand Pivot Transfers: Contact-guard assistance  Bed to Chair: Adaptive equipment, Other (comment) Josie Redder steady due to balance deficits in sitting and standing)  Bed mobility  Supine to Sit: Minimal assistance (Trunk support.)  Sit to Supine:  (pt left seated in recliner)  Scooting: Contact guard assistance  Bed Mobility Comments: HOB elevated.     Gait Training  Gait Training: Yes  Gait  Overall Level of Assistance: Contact-guard assistance, Additional time (Slow guarded steps.)  Interventions: Safety awareness training, Verbal cues  Base of Support: Narrowed  Speed/Dede: Slow  Gait Abnormalities:  (B hips/knees flexed)  Distance (ft): 120 Feet (x2; 60' x 1 w/o device and min A+1)  Assistive Device: Walker, rolling  Transfers  Sit to Stand: Moderate Assistance  Stand to Sit: Moderate Assistance  Comment: STS performed x3 with RW; pt demonstrating posterior lean with transfers  Pt reports intermittent dizziness but does not limit treatment     O2 Device: Nasal cannula (3L)  Bed Mobility Training  Bed Mobility Training: No  Transfer Training  Transfer Training: Yes  Overall Level of Assistance: Minimum assistance;Supervision; Additional time (initially min A, progressed to SBA+1)  Interventions: Safety awareness training;Verbal cues  Sit to Stand: Minimum assistance;Supervision; Additional time  Stand to Sit: Minimum assistance;Supervision (pt had one episode of poor control when sitting requiring min A; otherwise just supervision)  Stand Pivot Transfers: Contact-guard assistance  Gait Training  Gait Training: Yes  Gait  Overall Level of Assistance: Contact-guard assistance; Additional time (Slow guarded steps.)  Interventions: Safety awareness training;Verbal cues  Speed/Dede: Slow  Gait Abnormalities:  (B hips/knees flexed)  Distance (ft): 120 Feet (x2; 60' x 1 w/o device and min A+1)  Assistive Device: Walker, rolling  Safety Devices  Type of Devices: Call light within reach; Chair alarm in place;Gait belt;Patient at risk for falls; Left in chair;Nurse notified  Restraints    OT:   ADL  Feeding: Modified independent , Setup, Beverage management  Grooming: Setup, Increased time to complete, Stand by assistance (Oral care standing at sink.)  Grooming Skilled Clinical Factors:  Face washing facilitated seated in chair  UE Bathing:  (wash face/hands standing at sink.)  LE Bathing: Setup, Increased time to complete, Stand by assistance  UE Dressing: Minimal assistance, Setup, Verbal cueing, Increased time to complete  UE Dressing Skilled Clinical Factors: To don gown seated at EOB as simulated jacket req assist to thread LUE  LE Dressing: Maximum assistance, Increased time to complete, Verbal cueing, Setup  Toileting: Maximum assistance, Increased time to complete, Setup  Additional Comments: Pt completed supine/sit transfer to seated EOB. Pt stated mild dizziness after transfer with symptoms subsiding after 1 minute. Sit/stand transfer using RW for static standing EOB. Functional mobility EOB/chair. Seated rest break needed before second sit/stand. Pt producing phlem, c/o mild nausea w/o emesis. Sit/stand transfer using RW from chair for static standing. Functional mobility chair/sink. Simple grooming completed standing at sink. Mild unsteadiness noted during standing dynamic activity. Pt exited bathroom to return to seated in chair. Pt set up with lunch tray on tray table in front of pt. Per pt spouse, pt smell very sensitive unable to eat much d/t nausea. 12/13   ADL  Feeding: Modified independent ;Setup; Beverage management  Grooming: Setup; Increased time to complete;Stand by assistance (Oral care standing at sink.)  UE Bathing:  (wash face/hands standing at sink.)  LE Bathing: Setup; Increased time to complete;Stand by assistance  Additional Comments: Pt completed supine/sit transfer to seated EOB. Pt stated mild dizziness after transfer with symptoms subsiding after 1 minute. Sit/stand transfer using RW for static standing EOB. Functional mobility EOB/chair. Seated rest break needed before second sit/stand. Pt producing phlem, c/o mild nausea w/o emesis. Sit/stand transfer using RW from chair for static standing. Functional mobility chair/sink. Simple grooming completed standing at sink. Mild unsteadiness noted during standing dynamic activity. Pt exited bathroom to return to seated in chair.  Pt set up with lunch tray on tray table in front of pt. Per pt spouse, pt smell very sensitive unable to eat much d/t nausea. ST:  Patient presents with probable safe swallow for Easy to Chew with thin liquids, using  Double Swallow strategy. Recommend close monitoring for overt/clinical s/s of aspiration and D/C PO intake and complete Modified Barium Swallow Study should they occur. Results and recommendations reported to RN. Educated pt and wife on safe swallow strategies -- Double Swallow, slow rate, small bites/sips and sitting in upright position for feedings. Past Medical History:        Diagnosis Date    A-fib [facility])     CAD (coronary artery disease)     Depression     DM2 (diabetes mellitus, type 2) (HCC)     HTN (hypertension)        Past Surgical History:        Procedure Laterality Date    CARDIAC CATHETERIZATION      CAROTID ENDARTERECTOMY Right     09/2022    CARPAL TUNNEL RELEASE      CHOLECYSTECTOMY      COLONOSCOPY      CORONARY ARTERY BYPASS GRAFT      10/25/22    UPPER GASTROINTESTINAL ENDOSCOPY N/A 11/29/2022    EGD CONTROL HEMORRHAGE performed by Johnnie Orourke MD at [facility]       Allergies:     Allergies   Allergen Reactions    Atorvastatin Hives        Current Medications:   Current Facility-Administered Medications: potassium chloride (KLOR-CON M) extended release tablet 40 mEq, 40 mEq, Oral, BID WC  furosemide (LASIX) injection 40 mg, 40 mg, IntraVENous, Once  insulin glargine (LANTUS) injection vial 10 Units, 10 Units, SubCUTAneous, BID  cefTRIAXone (ROCEPHIN) 1,000 mg in sterile water 10 mL IV syringe, 1,000 mg, IntraVENous, Q24H  famotidine (PEPCID) tablet 20 mg, 20 mg, Oral, BID  metoclopramide (REGLAN) injection 10 mg, 10 mg, IntraVENous, Q6H PRN  insulin lispro (HUMALOG) injection vial 0-16 Units, 0-16 Units, SubCUTAneous, TID WC  insulin lispro (HUMALOG) injection vial 0-4 Units, 0-4 Units, SubCUTAneous, Nightly  pantoprazole (PROTONIX) tablet 40 mg, 40 mg, Oral, BID AC  sucralfate (CARAFATE) tablet 1 g, 1 g, Oral, 4x Daily AC & HS  amiodarone (CORDARONE) tablet 200 mg, 200 mg, Oral, Daily  metoprolol tartrate (LOPRESSOR) tablet 25 mg, 25 mg, Oral, BID  ferrous sulfate (FE TABS 325) EC tablet 325 mg, 325 mg, Oral, BID WC  escitalopram (LEXAPRO) tablet 10 mg, 10 mg, Oral, Daily  ezetimibe (ZETIA) tablet 10 mg, 10 mg, Oral, Nightly  tamsulosin (FLOMAX) capsule 0.4 mg, 0.4 mg, Oral, Daily  heparin (porcine) injection 5,000 Units, 5,000 Units, SubCUTAneous, 3 times per day  dextrose 50 % IV solution, 25 g, IntraVENous, Once  glucose chewable tablet 16 g, 4 tablet, Oral, PRN  dextrose bolus 10% 125 mL, 125 mL, IntraVENous, PRN **OR** dextrose bolus 10% 250 mL, 250 mL, IntraVENous, PRN  glucagon (rDNA) injection 1 mg, 1 mg, SubCUTAneous, PRN  dextrose 10 % infusion, , IntraVENous, Continuous PRN  neomycin-bacitracin-polymyxin (NEOSPORIN) ointment, , Topical, BID  aspirin chewable tablet 81 mg, 81 mg, Oral, Daily  sodium chloride flush 0.9 % injection 5-40 mL, 5-40 mL, IntraVENous, 2 times per day  sodium chloride flush 0.9 % injection 5-40 mL, 5-40 mL, IntraVENous, PRN  0.9 % sodium chloride infusion, , IntraVENous, PRN  ondansetron (ZOFRAN-ODT) disintegrating tablet 4 mg, 4 mg, Oral, Q8H PRN **OR** ondansetron (ZOFRAN) injection 4 mg, 4 mg, IntraVENous, Q6H PRN  polyethylene glycol (GLYCOLAX) packet 17 g, 17 g, Oral, Daily PRN  acetaminophen (TYLENOL) tablet 650 mg, 650 mg, Oral, Q6H PRN **OR** acetaminophen (TYLENOL) suppository 650 mg, 650 mg, Rectal, Q6H PRN  sodium phosphate 10 mmol in sodium chloride 0.9 % 250 mL IVPB, 10 mmol, IntraVENous, PRN **OR** sodium phosphate 15 mmol in sodium chloride 0.9 % 250 mL IVPB, 15 mmol, IntraVENous, PRN **OR** sodium phosphate 20 mmol in sodium chloride 0.9 % 500 mL IVPB, 20 mmol, IntraVENous, PRN  magnesium sulfate 2000 mg in 50 mL IVPB premix, 2,000 mg, IntraVENous, PRN  potassium chloride 20 mEq/50 mL IVPB (Central Line), 20 mEq, IntraVENous, PRN **OR** potassium chloride 10 mEq/100 mL IVPB (Peripheral Line), 10 mEq, IntraVENous, PRN    Social History:  Social History     Socioeconomic History    Marital status:      Spouse name: Not on file    Number of children: Not on file    Years of education: Not on file    Highest education level: Not on file   Occupational History    Not on file   Tobacco Use    Smoking status: Former     Types: Cigarettes    Smokeless tobacco: Never   Substance and Sexual Activity    Alcohol use: Not Currently    Drug use: Never    Sexual activity: Not on file   Other Topics Concern    Not on file   Social History Narrative    Not on file     Social Determinants of Health     Financial Resource Strain: Not on file   Food Insecurity: Not on file   Transportation Needs: Not on file   Physical Activity: Not on file   Stress: Not on file   Social Connections: Not on file   Intimate Partner Violence: Not on file   Housing Stability: Not on file     Social/Functional History  Lives With: Spouse  Type of Home: [address] [place], [place] 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  Leisure & Hobbies: [address], hunting, spending time with family  Additional Comments: Has supportive family to provide assistance at discharge    Family History:       Problem Relation Age of Onset    Cancer Mother     Cancer Father     Heart Disease Brother            Physical Exam:    BP (!) 161/72   Pulse 67   Temp 98.1 °F (36.7 °C) (Oral)   Resp 14   Ht 6' 2\" (1.88 m)   Wt 186 lb 4.6 oz (84.5 kg)   SpO2 93%   BMI 23.92 kg/m²     General appearance: alert, appears stated age, cooperative, and no distress  HEENT: Normocephalic, without obvious abnormality, atraumatic               Eyes: conjunctivae clear. Throat: tongue normal.               Neck:  symmetrical, trachea midline. Pulm: clear to auscultation bilaterally. Cardiac: regular rate and rhythm, no murmur. Abdomen: soft, non-tender; bowel sounds normal.  MSK: extremities normal, atraumatic, no edema, normal tone. ROM: Functional range of motion upper and distal lower extremity  Mental status/Psych: Alert, orientedX3, thought content appropriate. Knew year, president location, follows commands, names  Skin:     Neuro:      Sensory: Intact in BUE and BLE to soft and pin sensation. Motor: Muscle tone and bulk are normal bilaterally. No pronator drift. Coordination: finger to nose normal bilaterally. Diagnostics:  CBC   Lab Results   Component Value Date/Time    WBC 9.9 12/14/2022 03:55 AM    RBC 2.71 12/14/2022 03:55 AM    HGB 7.8 12/14/2022 03:55 AM    HCT 24.5 12/14/2022 03:55 AM    MCV 90.4 12/14/2022 03:55 AM    RDW 15.9 12/14/2022 03:55 AM     12/14/2022 03:55 AM     BMP    Lab Results   Component Value Date/Time     12/14/2022 03:55 AM    K 2.8 12/14/2022 03:55 AM    CL 96 12/14/2022 03:55 AM    CO2 32 12/14/2022 03:55 AM    BUN 15 12/14/2022 03:55 AM     Uric Acid  No components found for: URIC  VITAMIN B12 No components found for: B12  PT/INR  No results found for: PTINR      Impression: Mr. Lady Langley is a 68 y.o. male with a history of Upper GI bleed    GI bleed duodenal ulcer Pepcid twice daily, Reglan IV every 6 hours as, Protonix twice daily  Recent CABG x2 in October 22 aspirin amiodarone. Troponin  A.  Fib  Diabetes  Hypertension/hyperlipidemia  Depression lisinopril  Uti- Ceftriaxone until 12/16  Hypokalemia  Anemia hemoglobin 7.8  O2-was not on at home    Recommendations:  1. Diagnosis: Deconditioning due to GI bleed  2. Therapy: Ambulate 120 feet contact-guard ,standby assist lower extremities, easy to double swallow study-speech follow min assist to supervision for overall level of assistance  3. Medical  Necessity: As above  4. Support: Clarify, present available 24/7  5. Rehab recommendation: As noted ambulate-120 feet contact-guard, standby assist lower extremity-high level for acute inpatient rehabilitation-discussed with spouse and patient-they feel comfortable with patient needs-they would prefer  home discharge recommend home with assist versus SNF depending on support    On Protonix and Pepcid? 6. DVT proph: Subcu heparin    It was my pleasure to evaluate Nhung today. Please call with questions. Roma Burkitt. Herbie Phelps MD          This note is created with the assistance of a speech recognition program.  While intending to generate a document that actually reflects the content of the visit, the document can still have some errors including those of syntax and sound a like substitutions which may escape proof reading.   In such instances, actual meaning can be extrapolated by contextual diversion

## 2022-12-14 NOTE — PROGRESS NOTES
CLINICAL PHARMACY NOTE: MEDS TO BEDS    Total # of Prescriptions Filled: 9   The following medications were delivered to the patient:  Ezetimibe  Sucralfate  Cefdinir  Ferosul  Tamsulosin  Amiodarone  Metoprolol  Potassium  furosemide    Additional Documentation:

## 2022-12-14 NOTE — DISCHARGE INSTRUCTIONS
See PCP 1 week, followup on kidney function and potassium level next week  Call GI office to setup appointment, need clearance to restart warfarin   Followup with your cardiologist

## 2022-12-14 NOTE — PROGRESS NOTES
Home Oxygen Evaluation    Home Oxygen Evaluation completed. Patient is on 0 liters per minute via room air. Resting SpO2 = 97%  Resting SpO2 on room air = 97%    SpO2 on room air with exercise = 90%    Patient does not require home oxygen at this time.      Denilson Rome RCP  2:39 PM

## 2022-12-14 NOTE — PROGRESS NOTES
Physical Therapy  Facility/Department: [address] NEURO  Daily Treatment Note  NAME: Margi Kasper  : 1945  MRN: [id number]    Date of Service: 2022    Discharge Recommendations:  Patient would benefit from continued therapy after discharge   PT Equipment Recommendations  Equipment Needed: Yes  Mobility Devices: Nanine Stef: Rolling    Patient Diagnosis(es): There were no encounter diagnoses. Assessment   Pt cooperative, motivated, plans to go home with his wife who was present for PT session and witnessed pt had LOB while ambulating in the hallway w/o assistive device; no LOB with rwalker and recommend pt to use at home for safety when ambulating--pt and wife agreeable to plan. Activity Tolerance: Patient tolerated treatment well  Equipment Needed: Yes  Mobility Devices: [address] [place]    Physcial Therapy Plan  General Plan: 6-7 times per week  Current Treatment Recommendations: Strengthening;ROM;Balance training;Gait training;Functional mobility training;Transfer training;Stair training;Home exercise program;Safety education & training;Patient/Caregiver education & training; Therapeutic activities; Equipment evaluation, education, & procurement; Endurance training;Positioning  PT Plan of Care: Daily     Restrictions  Restrictions/Precautions  Restrictions/Precautions: Up as Tolerated  Required Braces or Orthoses?: No  Position Activity Restriction  Other position/activity restrictions: O2 NC 2 lm.      Subjective    Subjective  Pain: denies pain; mildly nauseous, but able to mobilize  Orientation  Overall Orientation Status: Within Functional Limits  Cognition  Overall Cognitive Status: WFL     Objective   Vitals  O2 Device: None (Room air)  Comment: O2 sats 96% on 2L; 90/91% on room air at rest; 88-90% on room air after ambulating  Bed Mobility Training  Bed Mobility Training: Yes  Overall Level of Assistance: Independent  Rolling: Independent  Supine to Sit: Independent  Scooting: Independent  Balance  Sitting: Intact  Standing: With support  Transfer Training  Transfer Training: Yes  Overall Level of Assistance: Supervision  Interventions: Verbal cues  Sit to Stand: Supervision  Stand to Sit: Supervision  Stand Pivot Transfers: Stand-by assistance;Contact-guard assistance  Bed to Chair: Stand-by assistance  Gait Training  Gait Training: Yes  Gait  Overall Level of Assistance: Supervision;Contact-guard assistance  Interventions: Safety awareness training;Verbal cues  Gait Abnormalities: Path deviations  Distance (ft): 200 Feet x 2 w/o device, SBA with min A x 4 1st walk for LOB, 2 during 2nd walk for LOB; 200'x1 rwalker with supervision, no LOB. Seated rest break in between each ambulation           Safety Devices  Type of Devices: Call light within reach; Chair alarm in place;Gait belt;Patient at risk for falls; Left in chair;Nurse notified  Restraints  Restraints Initially in Place: No       Goals  Short Term Goals  Time Frame for Short Term Goals: 14  Short Term Goal 1: Pt to perform bed mobility with supervision  Short Term Goal 2: Pt to demonstrate functional transfers CGA  Short Term Goal 3: Pt to ambulate 150ft w/ RW Otilia  Short Term Goal 4: Tolerate 30 minutes of therapy to demo increased endurance  Patient Goals   Patient Goals : To get stronger    Education  Patient Education  Education Given To: Patient; Family  Education Provided: Role of Therapy;Plan of Care;Transfer Training; Fall Prevention Strategies;Precautions; Family Education;Equipment  Education Method: Verbal;Demonstration; Teach Back  Barriers to Learning: None  Education Outcome: Verbalized understanding;Continued education needed    Therapy Time   Individual Concurrent Group Co-treatment   Time In 1055         Time Out 1120         Minutes 25         Timed Code Treatment Minutes: 25 Minutes       Barb Schulz, PT

## 2022-12-14 NOTE — CARE COORDINATION
Calos Perez was evaluated today and a DME order was entered for a wheeled walker because he requires this to successfully complete daily living tasks of ambulating, grooming, and hygiene. A wheeled walker is necessary due to the patient's unsteady gait, upper body weakness, and inability to  an ambulation device; and he can ambulate only by pushing a walker instead of a lesser assistive device such as a cane, crutch, or standard walker. The need for this equipment was discussed with the patient and he understands and is in agreement.

## 2022-12-14 NOTE — PLAN OF CARE
Problem: Discharge Planning  Goal: Discharge to home or other facility with appropriate resources  Outcome: Progressing     Problem: Confusion  Goal: Confusion, delirium, dementia, or psychosis is improved or at baseline  Description: INTERVENTIONS:  1. Assess for possible contributors to thought disturbance, including medications, impaired vision or hearing, underlying metabolic abnormalities, dehydration, psychiatric diagnoses, and notify attending LIP  2. [place] high risk fall precautions, as indicated  3. Provide frequent short contacts to provide reality reorientation, refocusing and direction  4. Decrease environmental stimuli, including noise as appropriate  5. Monitor and intervene to maintain adequate nutrition, hydration, elimination, sleep and activity  6. If unable to ensure safety without constant attention obtain sitter and review sitter guidelines with assigned personnel  7.  Initiate Psychosocial CNS and Spiritual Care consult, as indicated  Outcome: Progressing     Problem: Pain  Goal: Verbalizes/displays adequate comfort level or baseline comfort level  Outcome: Progressing     Problem: Cardiovascular - Adult  Goal: Maintains optimal cardiac output and hemodynamic stability  Outcome: Progressing  Goal: Absence of cardiac dysrhythmias or at baseline  Outcome: Progressing     Problem: Gastrointestinal - Adult  Goal: Minimal or absence of nausea and vomiting  Outcome: Progressing  Goal: Maintains or returns to baseline bowel function  Outcome: Progressing  Goal: Maintains adequate nutritional intake  Outcome: Progressing  Goal: Establish and maintain optimal ostomy function  Outcome: Progressing     Problem: Respiratory - Adult  Goal: Achieves optimal ventilation and oxygenation  Outcome: Progressing     Problem: Neurosensory - Adult  Goal: Achieves stable or improved neurological status  Outcome: Progressing  Goal: Absence of seizures  Outcome: Progressing  Goal: Remains free of injury related to seizures activity  Outcome: Progressing  Goal: Achieves maximal functionality and self care  Outcome: Progressing     Problem: Skin/Tissue Integrity - Adult  Goal: Skin integrity remains intact  Outcome: Progressing  Goal: Incisions, wounds, or drain sites healing without S/S of infection  Outcome: Progressing  Goal: Oral mucous membranes remain intact  Outcome: Progressing     Problem: Nutrition Deficit:  Goal: Optimize nutritional status  Outcome: Progressing     Problem: Skin/Tissue Integrity  Goal: Absence of new skin breakdown  Description: 1. Monitor for areas of redness and/or skin breakdown  2. Assess vascular access sites hourly  3. Every 4-6 hours minimum:  Change oxygen saturation probe site  4. Every 4-6 hours:  If on nasal continuous positive airway pressure, respiratory therapy assess nares and determine need for appliance change or resting period.   Outcome: Progressing     Problem: Safety - Adult  Goal: Free from fall injury  Outcome: Progressing     Problem: ABCDS Injury Assessment  Goal: Absence of physical injury  Outcome: Progressing     Problem: Chronic Conditions and Co-morbidities  Goal: Patient's chronic conditions and co-morbidity symptoms are monitored and maintained or improved  Outcome: Progressing

## 2022-12-14 NOTE — DISCHARGE SUMMARY
[facility]  Office: [address], DO, Isaac Patella, DO, Marionsydnee Beal, DO, Doretha Betancourt Blood, DO, Fabiola Valadez MD, Pushpa Burton MD, Antoinette Montoya MD, Samantha Velez MD,  Maya Singh MD, Sandy Wright MD, Elham Squires DO, Maggie Herndon MD,  Mulu Blake MD, Kelsey Lyon MD, Cory Coy DO, Hodan Poon MD, Vinnie Parra MD, Iesha Ventura DO, Kristina Handy MD, Nancy Recinos MD, Cristino Delong MD, Shanti Borrero MD, Marjorie Short DO, Raghu Snyder MD, Anila Allison MD, Shelia Perera, CNP,  Matthew Luu, CNP, Jeanie Lopez, CNP, Merwyn Cooks, CNP,  Kalani Green, [facility] [place], Jennifer Basurto, CNP, Silvino Gallo, CNP, Yan Monsalve, CNP, Memo Hdez, CNP, Dm Sparks, CNP, Valeriy Farmer PA-C, Bethany Jorge, CNS, Antonietta Carlin, CNP, Josue Santana, [facility]    Discharge Summary     Patient ID: Araseli Baer  :  1945   MRN: [id number]     ACCOUNT:  [de-identified]   Patient's PCP: Yahir Frost  Admit Date: 2022   Discharge Date: 2022     Length of Stay: 15  Code Status:  Full Code  Admitting Physician: Mauro Ruiz DO  Discharge Physician: Elham Squires DO     Active Discharge Diagnoses:     Hospital Problem Lists:  Principal Problem:    Upper GI bleed  Active Problems:    Atrial fibrillation with rapid ventricular response [facility])    Essential hypertension    S/P CABG (coronary artery bypass graft)    Anemia due to blood loss, acute    Type 2 diabetes mellitus with hyperglycemia, with long-term current use of insulin (HCC)    Hemorrhagic shock (HCC)    Orthostatic dizziness    Lethargy    Demand ischemia (HCC)    CHF exacerbation ([facility] [address]    Acute respiratory failure with hypoxia ([facility] [address]  Resolved Problems:    * No resolved hospital problems.  *      Admission Condition:  good     Discharged Condition: good    [facility]     [facility]: From H&P  Patient was life flighted from [facility] for rectal bleed at outlying facility. Patient has underlying history of CAD with CABG 2 months before, A. fib on Coumadin, diabetes mellitus, hypertension. [facility] and did not show any active bleed from duodenal ulcer. Patient had severe anemia 6.8. Patient was intubated secondary to respiratory failure and transferred to [facility]. Patient was given multiple transfusion. Coagulopathy from Coumadin was reversed with FFP, cryo, Kcentra, vitamin K. Patient underwent EGD on 11/28/2022 and showed duodenitis, nonbleeding duodenal ulcer with a visible vessel that was cauterized with bipolar probe. Patient was treated with prolonged Protonix infusion and hold anticoagulants and antiplatelet medications were held. Patient also required pressor support and fluid resuscitation in ICU. Patient was successfully extubated and treated with NIV postextubation. Patient continued to have tarry stools. Aspirin was resumed due to recent CAD and CABG after GI clearance on 12/6/2022. Patient also has history of A. fib and has been having rapid ventricular response. He was also treated with amiodarone. Patient was eventually transferred to floor, remained stable and eventually after working with therapy was able to ambulate well enough to go home. Followup  See PCP 1 week, followup on kidney function and potassium level next week.  May resume anticoagulation cautiously if able   Call GI office to setup appointment, need clearance to restart warfarin   Followup with your cardiologist      Significant therapeutic interventions:   EGD    Significant Diagnostic Studies:   Labs / Micro:  CBC:   Lab Results   Component Value Date/Time    WBC 9.9 12/14/2022 03:55 AM    RBC 2.71 12/14/2022 03:55 AM    HGB 7.8 12/14/2022 03:55 AM    HCT 24.5 12/14/2022 03:55 AM    MCV 90.4 12/14/2022 03:55 AM    MCH 28.8 12/14/2022 03:55 AM    MCHC 31.8 12/14/2022 03:55 AM    RDW 15.9 12/14/2022 03:55 AM     12/14/2022 03:55 AM     BMP:    Lab Results   Component Value Date/Time    GLUCOSE 109 12/14/2022 04:44 PM     12/14/2022 04:44 PM    K 3.4 12/14/2022 04:44 PM    CL 91 12/14/2022 04:44 PM    CO2 27 12/14/2022 04:44 PM    ANIONGAP 14 12/14/2022 04:44 PM    BUN 13 12/14/2022 04:44 PM    CREATININE 1.10 12/14/2022 04:44 PM    CALCIUM 8.2 12/14/2022 04:44 PM    LABGLOM >60 12/14/2022 04:44 PM     HFP:    Lab Results   Component Value Date/Time    PROT 5.5 12/12/2022 08:21 AM     CMP:    Lab Results   Component Value Date/Time    GLUCOSE 109 12/14/2022 04:44 PM     12/14/2022 04:44 PM    K 3.4 12/14/2022 04:44 PM    CL 91 12/14/2022 04:44 PM    CO2 27 12/14/2022 04:44 PM    BUN 13 12/14/2022 04:44 PM    CREATININE 1.10 12/14/2022 04:44 PM    ANIONGAP 14 12/14/2022 04:44 PM    ALKPHOS 128 12/12/2022 08:21 AM    ALT 16 12/12/2022 08:21 AM    AST 23 12/12/2022 08:21 AM    BILITOT 0.3 12/12/2022 08:21 AM    LABALBU 2.8 12/14/2022 04:44 PM    ALBUMIN 0.8 12/12/2022 08:21 AM    LABGLOM >60 12/14/2022 04:44 PM    PROT 5.5 12/12/2022 08:21 AM    CALCIUM 8.2 12/14/2022 04:44 PM     PT/INR:    Lab Results   Component Value Date/Time    PROTIME 12.0 12/08/2022 01:48 PM    INR 1.1 12/08/2022 01:48 PM     PTT:   Lab Results   Component Value Date/Time    APTT 44.1 12/05/2022 04:27 PM     FLP:    Lab Results   Component Value Date/Time    TRIG 241 11/30/2022 04:31 AM     U/A:    Lab Results   Component Value Date/Time    COLORU Yellow 12/12/2022 12:11 PM    TURBIDITY Turbid 12/12/2022 12:11 PM    SPECGRAV 1.022 12/12/2022 12:11 PM    HGBUR MODERATE 12/12/2022 12:11 PM    PHUR 5.5 12/12/2022 12:11 PM    PROTEINU 3+ 12/12/2022 12:11 PM    GLUCOSEU NEGATIVE 12/12/2022 12:11 PM    KETUA SMALL 12/12/2022 12:11 PM    BILIRUBINUR NEGATIVE 12/12/2022 12:11 PM    UROBILINOGEN Normal 12/12/2022 12:11 PM    NITRU NEGATIVE 12/12/2022 12:11 PM    LEUKOCYTESUR MODERATE 12/12/2022 12:11 PM     TSH:  No results found for: TSH     Radiology:  CT ABDOMEN PELVIS WO CONTRAST Additional Contrast? None    Result Date: 12/12/2022  1. Mild diffuse bladder wall thickening raises the possibility of cystitis. 2.  At least small pleural effusions, left greater than right, with findings suggestive of interstitial edema. 3.  Recently administered oral contrast is present throughout the colon to the rectum. No bowel dilatation or wall thickening appreciated. XR CHEST (SINGLE VIEW FRONTAL)    Result Date: 12/13/2022  Diffuse prominence of the pulmonary interstitium which can be seen with interstitial pulmonary edema as well as atypical/viral infection. CT HEAD WO CONTRAST    Result Date: 12/9/2022  No acute intracranial abnormality. XR CHEST PORTABLE    Result Date: 12/12/2022  Mild bibasilar airspace opacities right greater than left. This could represent atelectasis or pneumonia in the appropriate clinical setting. XR CHEST PORTABLE    Result Date: 12/9/2022  Increasing vascular congestion and interstitial opacities suggestive of developing edema. XR CHEST PORTABLE    Result Date: 12/8/2022  Removal of the endotracheal and gastric tubes. Improved bibasilar airspace opacities. FL MODIFIED BARIUM SWALLOW W VIDEO    Result Date: 12/11/2022  1. Penetration without aspiration with the thin liquid substance by straw and 2 attempts. 2. No penetration or aspiration with the remainder of the administered substances. Please see separate speech pathology report for full discussion of findings and recommendations.        Consultations:    Consults:     Final Specialist Recommendations/Findings:   IP CONSULT TO GI  IP CONSULT TO CARDIOLOGY  IP CONSULT TO IV TEAM  IP CONSULT TO PHYSICAL MEDICINE REHAB      The patient was seen and examined on day of discharge and this discharge summary is in conjunction with any daily progress note from day of discharge.     Discharge plan:     Disposition: Home    Physician Follow Up:     Debby Clark  [facility]  [place] [address]    Schedule an appointment as soon as possible for a visit in 1 week(s)  hospital followup    Nedra Bang MD  [address]  [address]  [phone]    Follow up  hospital followup, repeat EGD, clearance to restart warfarin       Requiring Further Evaluation/Follow Up POST HOSPITALIZATION/Incidental Findings:    See above    Diet: regular diet    Activity: As tolerated    Instructions to Patient:   See PCP 1 week, followup on kidney function and potassium level next week  Call GI office to setup appointment, need clearance to restart warfarin   Followup with your cardiologist    Discharge Medications:      Medication List        START taking these medications      amiodarone 200 MG tablet  Commonly known as: CORDARONE  Take 1 tablet by mouth daily  Start taking on: December 15, 2022     cefdinir 300 MG capsule  Commonly known as: OMNICEF  Take 1 capsule by mouth 2 times daily for 5 days     ezetimibe 10 MG tablet  Commonly known as: ZETIA  Take 1 tablet by mouth nightly     ferrous sulfate 325 (65 Fe) MG EC tablet  Commonly known as: FE TABS 325  Take 1 tablet by mouth 2 times daily (with meals)     furosemide 40 MG tablet  Commonly known as: Lasix  Take 3 tablets by mouth daily     metoprolol tartrate 25 MG tablet  Commonly known as: LOPRESSOR  Take 1 tablet by mouth 2 times daily     potassium chloride 20 MEQ extended release tablet  Commonly known as: KLOR-CON M  Take 1 tablet by mouth 2 times daily     sucralfate 1 GM tablet  Commonly known as: CARAFATE  Take 1 tablet by mouth 4 times daily (before meals and nightly)     tamsulosin 0.4 MG capsule  Commonly known as: FLOMAX  Take 1 capsule by mouth daily  Start taking on: December 15, 2022            Jony Winston taking these medications      aspirin 81 MG EC tablet     escitalopram 10 MG tablet  Commonly known as: LEXAPRO     metFORMIN 500 MG extended release tablet  Commonly known as: GLUCOPHAGE-XR     omeprazole 20 MG delayed release capsule  Commonly known as: PRILOSEC     rosuvastatin 5 MG tablet  Commonly known as: CRESTOR            STOP taking these medications      Basaglar KwikPen 100 UNIT/ML injection pen  Generic drug: insulin glargine     carvedilol 6.25 MG tablet  Commonly known as: COREG     glipiZIDE 10 MG extended release tablet  Commonly known as: GLUCOTROL XL     isosorbide mononitrate 30 MG extended release tablet  Commonly known as: IMDUR     nitroGLYCERIN 0.4 MG SL tablet  Commonly known as: NITROSTAT               Where to Get Your Medications        These medications were sent to [address], [place] [address]      Phone: [phone]   amiodarone 200 MG tablet  cefdinir 300 MG capsule  ezetimibe 10 MG tablet  ferrous sulfate 325 (65 Fe) MG EC tablet  furosemide 40 MG tablet  metoprolol tartrate 25 MG tablet  potassium chloride 20 MEQ extended release tablet  sucralfate 1 GM tablet  tamsulosin 0.4 MG capsule         No discharge procedures on file. Time Spent on discharge is  36 mins in patient examination, evaluation, counseling as well as medication reconciliation, prescriptions for required medications, discharge plan and follow up. Electronically signed by   Shorty Michaels DO  12/14/2022  12:03 PM      Thank you Dr. Kevon Steen for the opportunity to be involved in this patient's care.

## 2022-12-14 NOTE — DISCHARGE INSTR - COC
Pulse 56   Temp 97.3 °F (36.3 °C) (Temporal)   Resp 13   Ht 6' 2\" (1.88 m)   Wt 186 lb 4.6 oz (84.5 kg)   SpO2 97%   BMI 23.92 kg/m²     Last documented pain score (0-10 scale): Pain Level: 0  Last Weight:   Wt Readings from Last 1 Encounters:   12/13/22 186 lb 4.6 oz (84.5 kg)     Mental Status:  oriented, alert, thought processes intact, and able to concentrate and follow conversation    IV Access:  - None    Nursing Mobility/ADLs:  Walking   Assisted  Transfer  Assisted  Bathing  Assisted  Dressing  Assisted  Toileting  Assisted  Feeding  [facility] Delivery   whole    Wound Care Documentation and Therapy:        Elimination:  Continence: Bowel: Yes  Bladder: Yes  Urinary Catheter: None   Colostomy/Ileostomy/Ileal Conduit: No       Date of Last BM: 12/13/2022    Intake/Output Summary (Last 24 hours) at 12/14/2022 1203  Last data filed at 12/14/2022 0848  Gross per 24 hour   Intake 600 ml   Output 1525 ml   Net -925 ml     I/O last 3 completed shifts: In: 1260 [P.O.:1050; I.V.:10; IV Piggyback:200]  Out: 1450 [Urine:1450]    Safety Concerns: At Risk for Falls    Impairments/Disabilities:      None    Nutrition Therapy:  Current Nutrition Therapy:   - Oral Diet:  Carb Control 4 carbs/meal (1800kcals/day) and Dysphagia 1 pureed    Routes of Feeding: Oral  Liquids: No Restrictions  Daily Fluid Restriction: no  Last Modified Barium Swallow with Video (Video Swallowing Test): done on 12/10/2022    Treatments at the Time of Hospital Discharge:   Respiratory Treatments: N/A  Oxygen Therapy:  is not on home oxygen therapy.   Ventilator:    - No ventilator support    Rehab Therapies: Physical Therapy and Occupational Therapy  Weight Bearing Status/Restrictions: No weight bearing restrictions  Other Medical Equipment (for information only, NOT a DME order):  walker  Other Treatments: N/A    Patient's personal belongings (please select all that are sent with patient):  None    RN SIGNATURE:  Electronically signed by Michelle Kothari on 12/14/22 at 3:26 PM EST    CASE MANAGEMENT/SOCIAL WORK SECTION    Inpatient Status Date: ***    Readmission Risk Assessment Score:  Readmission Risk              Risk of Unplanned Readmission:  23           Discharging to Facility/ Agency   Name: [facility] Hilton [phone]  Address:  Phone:  Fax:      Francisco Sumner [phone]    Dialysis Facility (if applicable)   Name:  Address:  Dialysis Schedule:  Phone:  Fax:    / signature: {Esignature:[id number]}    PHYSICIAN SECTION    Prognosis: Good    Condition at Discharge: Stable    Rehab Potential (if transferring to Rehab): Good    Recommended Labs or Other Treatments After Discharge:   See PCP 1 week, followup on kidney function and potassium level next week  Call GI office to setup appointment, need clearance to restart warfarin   Followup with your cardiologist    Physician Certification: I certify the above information and transfer of Jennifer Dennis  is necessary for the continuing treatment of the diagnosis listed and that he requires Home Care for greater 30 days.      Update Admission H&P: No change in H&P    PHYSICIAN SIGNATURE:  Electronically signed by Pascual Miller DO on 12/14/22 at 12:03 PM EST

## 2022-12-15 NOTE — PROGRESS NOTES
RN went over discharge paperwork with patient & patient wife at bedside, all questions answered. Meds to beds delivered all meds and all questions answered regarding meds. IV removed. Patient left with all personal belongings, discharge paperwork, and meds to beds. Patient was taken via wheelchair to wife's car at hospital entrance and discharged home. Patient discharged safely.

## 2022-12-15 NOTE — PLAN OF CARE
Problem: Discharge Planning  Goal: Discharge to home or other facility with appropriate resources  Outcome: Completed     Problem: Confusion  Goal: Confusion, delirium, dementia, or psychosis is improved or at baseline  Description: INTERVENTIONS:  1. Assess for possible contributors to thought disturbance, including medications, impaired vision or hearing, underlying metabolic abnormalities, dehydration, psychiatric diagnoses, and notify attending LIP  2. [place] high risk fall precautions, as indicated  3. Provide frequent short contacts to provide reality reorientation, refocusing and direction  4. Decrease environmental stimuli, including noise as appropriate  5. Monitor and intervene to maintain adequate nutrition, hydration, elimination, sleep and activity  6. If unable to ensure safety without constant attention obtain sitter and review sitter guidelines with assigned personnel  7. Initiate Psychosocial CNS and Spiritual Care consult, as indicated  Outcome: Completed     Problem: Pain  Goal: Verbalizes/displays adequate comfort level or baseline comfort level  Outcome: Completed     Problem: Cardiovascular - Adult  Goal: Maintains optimal cardiac output and hemodynamic stability  Outcome: Completed  Goal: Absence of cardiac dysrhythmias or at baseline  Outcome: Completed     Problem: Gastrointestinal - Adult  Goal: Minimal or absence of nausea and vomiting  Outcome: Completed  Goal: Maintains or returns to baseline bowel function  Outcome: Completed  Goal: Maintains adequate nutritional intake  Outcome: Completed  Goal: Establish and maintain optimal ostomy function  Outcome: Completed     Problem: Respiratory - Adult  Goal: Achieves optimal ventilation and oxygenation  Outcome: Completed     Problem: Nutrition Deficit:  Goal: Optimize nutritional status  Outcome: Completed     Problem: Skin/Tissue Integrity  Goal: Absence of new skin breakdown  Description: 1.   Monitor for areas of redness and/or skin breakdown  2. Assess vascular access sites hourly  3. Every 4-6 hours minimum:  Change oxygen saturation probe site  4. Every 4-6 hours:  If on nasal continuous positive airway pressure, respiratory therapy assess nares and determine need for appliance change or resting period.   Outcome: Completed     Problem: Safety - Adult  Goal: Free from fall injury  Outcome: Completed     Problem: ABCDS Injury Assessment  Goal: Absence of physical injury  Outcome: Completed     Problem: Neurosensory - Adult  Goal: Achieves stable or improved neurological status  Outcome: Completed  Goal: Absence of seizures  Outcome: Completed  Goal: Remains free of injury related to seizures activity  Outcome: Completed  Goal: Achieves maximal functionality and self care  Outcome: Completed     Problem: Skin/Tissue Integrity - Adult  Goal: Skin integrity remains intact  Outcome: Completed  Goal: Incisions, wounds, or drain sites healing without S/S of infection  Outcome: Completed  Goal: Oral mucous membranes remain intact  Outcome: Completed     Problem: Chronic Conditions and Co-morbidities  Goal: Patient's chronic conditions and co-morbidity symptoms are monitored and maintained or improved  Outcome: Completed

## 2023-01-30 ENCOUNTER — OFFICE VISIT (OUTPATIENT)
Dept: GASTROENTEROLOGY | Age: 78
End: 2023-01-30
Payer: MEDICARE

## 2023-01-30 VITALS
DIASTOLIC BLOOD PRESSURE: 60 MMHG | WEIGHT: 198 LBS | HEIGHT: 74 IN | SYSTOLIC BLOOD PRESSURE: 126 MMHG | BODY MASS INDEX: 25.41 KG/M2

## 2023-01-30 DIAGNOSIS — K27.9 PEPTIC ULCER DISEASE: Primary | ICD-10-CM

## 2023-01-30 PROCEDURE — 3078F DIAST BP <80 MM HG: CPT | Performed by: INTERNAL MEDICINE

## 2023-01-30 PROCEDURE — 3074F SYST BP LT 130 MM HG: CPT | Performed by: INTERNAL MEDICINE

## 2023-01-30 PROCEDURE — G8484 FLU IMMUNIZE NO ADMIN: HCPCS | Performed by: INTERNAL MEDICINE

## 2023-01-30 PROCEDURE — 99213 OFFICE O/P EST LOW 20 MIN: CPT | Performed by: INTERNAL MEDICINE

## 2023-01-30 PROCEDURE — G8417 CALC BMI ABV UP PARAM F/U: HCPCS | Performed by: INTERNAL MEDICINE

## 2023-01-30 PROCEDURE — 1123F ACP DISCUSS/DSCN MKR DOCD: CPT | Performed by: INTERNAL MEDICINE

## 2023-01-30 PROCEDURE — 4004F PT TOBACCO SCREEN RCVD TLK: CPT | Performed by: INTERNAL MEDICINE

## 2023-01-30 PROCEDURE — G8428 CUR MEDS NOT DOCUMENT: HCPCS | Performed by: INTERNAL MEDICINE

## 2023-01-30 ASSESSMENT — ENCOUNTER SYMPTOMS
COUGH: 0
SORE THROAT: 0
NAUSEA: 0
ABDOMINAL DISTENTION: 0
ABDOMINAL PAIN: 0
CHOKING: 0
VOMITING: 0
RECTAL PAIN: 0
TROUBLE SWALLOWING: 0
ANAL BLEEDING: 0
DIARRHEA: 0
SHORTNESS OF BREATH: 0
BLOOD IN STOOL: 0
CONSTIPATION: 0
CHEST TIGHTNESS: 0
ALLERGIC/IMMUNOLOGIC NEGATIVE: 1

## 2023-01-30 NOTE — PROGRESS NOTES
Reason for Referral: Post discharge follow-up for upper GI bleed, duodenal ulcer      No referring provider defined for this encounter. Chief Complaint   Patient presents with    Follow Up After Procedure     EGD paola           HISTORY OF PRESENT ILLNESS: Imani Lieberman is a 68 y.o. male with a past history remarkable for history of atrial fibrillation, CAD, depression, type 2 diabetes, hypertension, prior history of cholecystectomy, recent hospitalization in November 2022, identified to have duodenal ulcer with visible vessel status post bipolar cautery to achieve hemostasis, referred for evaluation of residual bleeding. Most recent hemoglobin obtained was greater than 10 g/dL per patient. Denies any melena, hematochezia, bright red blood per rectum. Denies any NSAID use. Remains on PPI therapy. Clinically doing well          Past Medical,Family, and Social History reviewed and does contribute to the patient presentingcondition. Patient's PMH/PSH,SH,PSYCH Hx, MEDs, ALLERGIES, and ROS were all reviewed and updated in the appropriate sections.     PAST MEDICAL HISTORY:  Past Medical History:   Diagnosis Date    A-fib ([facility]     CAD (coronary artery disease)     Depression     DM2 (diabetes mellitus, type 2) (HCC)     HTN (hypertension)        Past Surgical History:   Procedure Laterality Date    CARDIAC CATHETERIZATION      CAROTID ENDARTERECTOMY Right     09/2022    CARPAL TUNNEL RELEASE      CHOLECYSTECTOMY      COLONOSCOPY      CORONARY ARTERY BYPASS GRAFT      10/25/22    UPPER GASTROINTESTINAL ENDOSCOPY N/A 11/29/2022    EGD CONTROL HEMORRHAGE performed by Anayeli Haskins MD at [facility]       CURRENT MEDICATIONS:    Current Outpatient Medications:     furosemide (LASIX) 40 MG tablet, Take 3 tablets by mouth daily (Patient taking differently: Take 40 mg by mouth daily), Disp: 60 tablet, Rfl: 3    amiodarone (CORDARONE) 200 MG tablet, Take 1 tablet by mouth daily, Disp: 30 tablet, Rfl: 0    ezetimibe (ZETIA) 10 MG tablet, Take 1 tablet by mouth nightly, Disp: 30 tablet, Rfl: 3    ferrous sulfate (FE TABS 325) 325 (65 Fe) MG EC tablet, Take 1 tablet by mouth 2 times daily (with meals), Disp: 90 tablet, Rfl: 3    metoprolol tartrate (LOPRESSOR) 25 MG tablet, Take 1 tablet by mouth 2 times daily, Disp: 60 tablet, Rfl: 3    sucralfate (CARAFATE) 1 GM tablet, Take 1 tablet by mouth 4 times daily (before meals and nightly), Disp: 120 tablet, Rfl: 3    tamsulosin (FLOMAX) 0.4 MG capsule, Take 1 capsule by mouth daily (Patient not taking: Reported on 1/30/2023), Disp: 30 capsule, Rfl: 3    potassium chloride (KLOR-CON M) 20 MEQ extended release tablet, Take 1 tablet by mouth 2 times daily, Disp: 60 tablet, Rfl: 0    escitalopram (LEXAPRO) 10 MG tablet, Take 10 mg by mouth daily, Disp: , Rfl:     metFORMIN (GLUCOPHAGE-XR) 500 MG extended release tablet, Take 500 mg by mouth in the morning and at bedtime (Patient not taking: Reported on 1/30/2023), Disp: , Rfl:     aspirin 81 MG EC tablet, Take 81 mg by mouth daily, Disp: , Rfl:     rosuvastatin (CRESTOR) 5 MG tablet, Take 5 mg by mouth daily, Disp: , Rfl:     omeprazole (PRILOSEC) 20 MG delayed release capsule, Take 20 mg by mouth 2 times daily, Disp: , Rfl:     ALLERGIES:   Allergies   Allergen Reactions    Atorvastatin Hives       FAMILY HISTORY:       Problem Relation Age of Onset    Cancer Mother     Cancer Father     Heart Disease Brother          SOCIAL HISTORY:   Social History     Socioeconomic History    Marital status:      Spouse name: Not on file    Number of children: Not on file    Years of education: Not on file    Highest education level: Not on file   Occupational History    Not on file   Tobacco Use    Smoking status: Former     Types: Cigarettes    Smokeless tobacco: Never   Substance and Sexual Activity    Alcohol use: Not Currently    Drug use: Never    Sexual activity: Not on file   Other Topics Concern    Not on file   Social History Narrative    Not on file     Social Determinants of Health     Financial Resource Strain: Not on file   Food Insecurity: Not on file   Transportation Needs: Not on file   Physical Activity: Not on file   Stress: Not on file   Social Connections: Not on file   Intimate Partner Violence: Not on file   Housing Stability: Not on file         REVIEW OF SYSTEMS: A 12-point review of systems was obtained and pertinent positives and negatives were listed below. REVIEW OF SYSTEMS:     Constitutional: No fever, no chills, no lethargy, no weakness. HEENT:  No headache, otalgia, itchy eyes, nasal discharge or sore throat. Cardiac:  No chest pain, dyspnea, orthopnea or PND. Chest:   No cough, phlegm or wheezing. Abdomen:      Detailed by MA   Neuro:  No focal weakness, abnormal movements or seizure like activity. Skin:   No rashes, no itching. :   No hematuria, no pyuria, no dysuria, no flank pain. Extremities:  No swelling or joint pains. ROS was otherwise negative    Review of Systems   Constitutional:  Negative for appetite change and fatigue. HENT:  Negative for sore throat and trouble swallowing. Respiratory:  Negative for cough, choking, chest tightness and shortness of breath. Cardiovascular:  Negative for chest pain and leg swelling. Gastrointestinal:  Negative for abdominal distention, abdominal pain, anal bleeding, blood in stool, constipation, diarrhea, nausea, rectal pain and vomiting. Endocrine: Negative. Genitourinary: Negative. Negative for difficulty urinating. Musculoskeletal: Negative. Skin: Negative. Allergic/Immunologic: Negative. Neurological: Negative. Negative for dizziness, seizures, light-headedness, numbness and headaches. Hematological:  Bruises/bleeds easily. Psychiatric/Behavioral: Negative. Negative for behavioral problems, confusion and decreased concentration. The patient is not nervous/anxious.       PHYSICAL EXAMINATION: Vital signs reviewed per the nursing documentation. /60 (Site: Left Upper Arm, Position: Sitting, Cuff Size: Small Adult)   Ht 6' 2\" (1.88 m)   Wt 198 lb (89.8 kg)   BMI 25.42 kg/m²   Body mass index is 25.42 kg/m². Physical Exam    Physical Exam   Constitutional: Patient is oriented to person, place, and time. Patient appears well-developed and well-nourished. HENT:   Head: Normocephalic and atraumatic. Eyes: Pupils are equal, round, and reactive to light. EOM are normal.   Neck: Normal range of motion. Neck supple. No JVD present. No tracheal deviation present. No thyromegaly present. Cardiovascular: Normal rate, regular rhythm, normal heart sounds and intact distal pulses. Pulmonary/Chest: Effort normal and breath sounds normal. No stridor. No respiratory distress. He has no wheezes. He has no rales. He exhibits no tenderness. Abdominal: Soft. Bowel sounds are normal. He exhibits no distension and no mass. There is no tenderness. There is no rebound and no guarding. No hernia. Musculoskeletal: Normal range of motion. Lymphadenopathy:    Patient has no cervical adenopathy. Neurological: Patient is alert and oriented to person, place, and time. Psychiatric: Patient has a normal mood and affect.  Patient behavior is normal.       LABORATORY DATA: Reviewed  Lab Results   Component Value Date    WBC 9.9 12/14/2022    HGB 7.8 (L) 12/14/2022    HCT 24.5 (L) 12/14/2022    MCV 90.4 12/14/2022     (H) 12/14/2022     (L) 12/14/2022    K 3.4 (L) 12/14/2022    CL 91 (L) 12/14/2022    CO2 27 12/14/2022    BUN 13 12/14/2022    CREATININE 1.10 12/14/2022    LABALBU 2.8 (L) 12/14/2022    BILITOT 0.3 12/12/2022    ALKPHOS 128 12/12/2022    AST 23 12/12/2022    ALT 16 12/12/2022    INR 1.1 12/08/2022         Lab Results   Component Value Date    RBC 2.71 (L) 12/14/2022    HGB 7.8 (L) 12/14/2022    MCV 90.4 12/14/2022    MCH 28.8 12/14/2022    MCHC 31.8 12/14/2022    RDW 15.9 (H) 12/14/2022    MPV 9.9 12/14/2022    BASOPCT 0 12/08/2022    LYMPHSABS 1.07 (L) 12/08/2022    MONOSABS 0.09 (L) 12/08/2022    NEUTROABS 4.85 12/08/2022    EOSABS 0.09 12/08/2022    BASOSABS <0.03 12/08/2022         DIAGNOSTIC TESTING:     No results found. IMPRESSION: Minda Myers is a 68 y.o. male with a past history remarkable for history of atrial fibrillation, CAD, depression, type 2 diabetes, hypertension, prior history of cholecystectomy, recent hospitalization in November 2022, identified to have duodenal ulcer with visible vessel status post bipolar cautery to achieve hemostasis, referred for evaluation of residual bleeding. Most recent hemoglobin obtained was greater than 10 g/dL per patient. Denies any melena, hematochezia, bright red blood per rectum. Denies any NSAID use. Remains on PPI therapy. Clinically doing well    Assessment  1. Peptic ulcer disease        German Monahan was seen today for follow up after procedure. Diagnoses and all orders for this visit:    Peptic ulcer disease-duodenal ulcer identified in November 2022, status post endoscopic therapy and hemostasis. Clinically improving. No signs of recurrent bleeding. Hemoglobin uptrending. Will taper down PPI therapy once hemoglobin has been normalized. Continue to trend  -     CBC with Auto Differential; Future  -     Basic Metabolic Panel; Future  -     Iron and TIBC; Future     Dietary education provided. Avoidance of medications recommended i.e. NSAIDs      RTC: 3 months. Additional comments: Thank you for allowing me to participate in the care of Mr. Aubrey Morley. For any further questions please do not hesitate to contact me. I have reviewed and agree with the MA/LPN ROS please refer to their documentation from today's encounter on a separate note.      Vishal Irizarry MD, MPH   Board Certified in Gastroenterology  Board Certified in [address] [place] [address] #: [phone]          this note is created with the assistance of a speech recognition program.  While intending to generate a document that actually reflects the content of the visit, the document can still have some errors including those of syntax and sound a like substitutions which may escape proof reading. It such instances, actual meaning can be extrapolated by contextual diversion.

## 2023-03-14 LAB
BASOPHILS ABSOLUTE: ABNORMAL
BASOPHILS RELATIVE PERCENT: ABNORMAL
BUN BLDV-MCNC: 36 MG/DL
CALCIUM SERPL-MCNC: 8.9 MG/DL
CHLORIDE BLD-SCNC: 109 MMOL/L
CO2: 17 MMOL/L
CREAT SERPL-MCNC: 1.62 MG/DL
EGFR: 44
EOSINOPHILS ABSOLUTE: ABNORMAL
EOSINOPHILS RELATIVE PERCENT: ABNORMAL
GLUCOSE BLD-MCNC: 151 MG/DL
HCT VFR BLD CALC: 34.2 % (ref 41–53)
HEMOGLOBIN: 11.2 G/DL (ref 13.5–17.5)
LYMPHOCYTES ABSOLUTE: ABNORMAL
LYMPHOCYTES RELATIVE PERCENT: ABNORMAL
MCH RBC QN AUTO: 28.8 PG
MCHC RBC AUTO-ENTMCNC: 32.7 G/DL
MCV RBC AUTO: 88.1 FL
MONOCYTES ABSOLUTE: ABNORMAL
MONOCYTES RELATIVE PERCENT: ABNORMAL
NEUTROPHILS ABSOLUTE: ABNORMAL
NEUTROPHILS RELATIVE PERCENT: ABNORMAL
PDW BLD-RTO: 9.5 %
PLATELET # BLD: 186 K/ΜL
PMV BLD AUTO: ABNORMAL FL
POTASSIUM SERPL-SCNC: 5 MMOL/L
RBC # BLD: 3.88 10^6/ΜL
SODIUM BLD-SCNC: 136 MMOL/L
WBC # BLD: 7.4 10^3/ML

## 2023-04-19 DIAGNOSIS — K27.9 PEPTIC ULCER DISEASE: ICD-10-CM

## 2023-04-24 ENCOUNTER — OFFICE VISIT (OUTPATIENT)
Dept: GASTROENTEROLOGY | Age: 78
End: 2023-04-24
Payer: MEDICARE

## 2023-04-24 VITALS
HEART RATE: 51 BPM | SYSTOLIC BLOOD PRESSURE: 122 MMHG | OXYGEN SATURATION: 94 % | DIASTOLIC BLOOD PRESSURE: 60 MMHG | WEIGHT: 204 LBS | HEIGHT: 74 IN | BODY MASS INDEX: 26.18 KG/M2

## 2023-04-24 DIAGNOSIS — K26.9 DUODENAL ULCER: Primary | ICD-10-CM

## 2023-04-24 DIAGNOSIS — K92.0 GASTROINTESTINAL HEMORRHAGE WITH HEMATEMESIS: ICD-10-CM

## 2023-04-24 PROCEDURE — 1123F ACP DISCUSS/DSCN MKR DOCD: CPT | Performed by: INTERNAL MEDICINE

## 2023-04-24 PROCEDURE — 99213 OFFICE O/P EST LOW 20 MIN: CPT | Performed by: INTERNAL MEDICINE

## 2023-04-24 PROCEDURE — 3078F DIAST BP <80 MM HG: CPT | Performed by: INTERNAL MEDICINE

## 2023-04-24 PROCEDURE — G8427 DOCREV CUR MEDS BY ELIG CLIN: HCPCS | Performed by: INTERNAL MEDICINE

## 2023-04-24 PROCEDURE — G8417 CALC BMI ABV UP PARAM F/U: HCPCS | Performed by: INTERNAL MEDICINE

## 2023-04-24 PROCEDURE — 4004F PT TOBACCO SCREEN RCVD TLK: CPT | Performed by: INTERNAL MEDICINE

## 2023-04-24 PROCEDURE — 3074F SYST BP LT 130 MM HG: CPT | Performed by: INTERNAL MEDICINE

## 2023-04-24 ASSESSMENT — ENCOUNTER SYMPTOMS
SORE THROAT: 0
RECTAL PAIN: 0
DIARRHEA: 0
CONSTIPATION: 0
SHORTNESS OF BREATH: 0
ABDOMINAL PAIN: 0
TROUBLE SWALLOWING: 0
COUGH: 0
ALLERGIC/IMMUNOLOGIC NEGATIVE: 1
NAUSEA: 0
CHOKING: 0
CHEST TIGHTNESS: 0
ANAL BLEEDING: 0
VOMITING: 0
ABDOMINAL DISTENTION: 0
BLOOD IN STOOL: 0

## 2023-04-24 NOTE — PROGRESS NOTES
use: Not Currently    Drug use: Never    Sexual activity: Not on file   Other Topics Concern    Not on file   Social History Narrative    Not on file     Social Determinants of Health     Financial Resource Strain: Not on file   Food Insecurity: Not on file   Transportation Needs: Not on file   Physical Activity: Not on file   Stress: Not on file   Social Connections: Not on file   Intimate Partner Violence: Not on file   Housing Stability: Not on file         REVIEW OF SYSTEMS: A 12-point review of systems was obtained and pertinent positives and negatives were listed below. REVIEW OF SYSTEMS:     Constitutional: No fever, no chills, no lethargy, no weakness. HEENT:  No headache, otalgia, itchy eyes, nasal discharge or sore throat. Cardiac:  No chest pain, dyspnea, orthopnea or PND. Chest:   No cough, phlegm or wheezing. Abdomen:      Detailed by MA   Neuro:  No focal weakness, abnormal movements or seizure like activity. Skin:   No rashes, no itching. :   No hematuria, no pyuria, no dysuria, no flank pain. Extremities:  No swelling or joint pains. ROS was otherwise negative    Review of Systems   Constitutional:  Negative for appetite change and fatigue. HENT:  Negative for sore throat and trouble swallowing. Respiratory:  Negative for cough, choking, chest tightness and shortness of breath. Cardiovascular:  Negative for chest pain and leg swelling. Gastrointestinal:  Negative for abdominal distention, abdominal pain, anal bleeding, blood in stool, constipation, diarrhea, nausea, rectal pain and vomiting. Endocrine: Negative. Genitourinary: Negative. Negative for difficulty urinating. Musculoskeletal: Negative. Skin: Negative. Allergic/Immunologic: Negative. Neurological: Negative. Negative for dizziness, seizures, light-headedness, numbness and headaches. Hematological:  Bruises/bleeds easily. Psychiatric/Behavioral: Negative.   Negative for behavioral problems,

## 2023-05-04 ENCOUNTER — TELEPHONE (OUTPATIENT)
Dept: GASTROENTEROLOGY | Age: 78
End: 2023-05-04

## 2023-06-15 ENCOUNTER — HOSPITAL ENCOUNTER (INPATIENT)
Age: 78
LOS: 2 days | Discharge: HOME HEALTH | DRG: 093 | End: 2023-06-17
Attending: PSYCHIATRY & NEUROLOGY | Admitting: PSYCHIATRY & NEUROLOGY
Payer: MEDICARE

## 2023-06-15 PROBLEM — R47.01 EXPRESSIVE APHASIA: Status: ACTIVE | Noted: 2023-06-15

## 2023-06-15 LAB
ANION GAP SERPL CALCULATED.3IONS-SCNC: 13 MMOL/L (ref 9–17)
BASOPHILS # BLD: 0.04 K/UL (ref 0–0.2)
BASOPHILS NFR BLD: 1 % (ref 0–2)
BILIRUB UR QL STRIP: NEGATIVE
BUN SERPL-MCNC: 26 MG/DL (ref 8–23)
CALCIUM SERPL-MCNC: 9.4 MG/DL (ref 8.6–10.4)
CASTS #/AREA URNS LPF: NORMAL /LPF (ref 0–8)
CHLORIDE SERPL-SCNC: 100 MMOL/L (ref 98–107)
CLARITY UR: CLEAR
CO2 SERPL-SCNC: 19 MMOL/L (ref 20–31)
COLOR UR: YELLOW
CREAT SERPL-MCNC: 1.33 MG/DL (ref 0.7–1.2)
EKG ATRIAL RATE: 77 BPM
EKG P AXIS: 52 DEGREES
EKG P-R INTERVAL: 158 MS
EKG Q-T INTERVAL: 456 MS
EKG QRS DURATION: 156 MS
EKG QTC CALCULATION (BAZETT): 516 MS
EKG R AXIS: 45 DEGREES
EKG T AXIS: 150 DEGREES
EKG VENTRICULAR RATE: 77 BPM
EOSINOPHIL # BLD: 0.05 K/UL (ref 0–0.44)
EOSINOPHILS RELATIVE PERCENT: 1 % (ref 1–4)
EPI CELLS #/AREA URNS HPF: NORMAL /HPF (ref 0–5)
ERYTHROCYTE [DISTWIDTH] IN BLOOD BY AUTOMATED COUNT: 13.2 % (ref 11.8–14.4)
EST. AVERAGE GLUCOSE BLD GHB EST-MCNC: 180 MG/DL
FOLATE SERPL-MCNC: 18.4 NG/ML
GFR SERPL CREATININE-BSD FRML MDRD: 55 ML/MIN/1.73M2
GLUCOSE BLD-MCNC: 195 MG/DL (ref 75–110)
GLUCOSE BLD-MCNC: 241 MG/DL (ref 75–110)
GLUCOSE BLD-MCNC: 242 MG/DL (ref 75–110)
GLUCOSE SERPL-MCNC: 237 MG/DL (ref 70–99)
GLUCOSE UR STRIP-MCNC: ABNORMAL MG/DL
HBA1C MFR BLD: 7.9 % (ref 4–6)
HCT VFR BLD AUTO: 37.8 % (ref 40.7–50.3)
HGB BLD-MCNC: 12.3 G/DL (ref 13–17)
HGB UR QL STRIP.AUTO: ABNORMAL
IMM GRANULOCYTES # BLD AUTO: <0.03 K/UL (ref 0–0.3)
IMM GRANULOCYTES NFR BLD: 0 %
KETONES UR STRIP-MCNC: NEGATIVE MG/DL
LEUKOCYTE ESTERASE UR QL STRIP: NEGATIVE
LYMPHOCYTES # BLD: 15 % (ref 24–43)
LYMPHOCYTES NFR BLD: 1.14 K/UL (ref 1.1–3.7)
MCH RBC QN AUTO: 29.7 PG (ref 25.2–33.5)
MCHC RBC AUTO-ENTMCNC: 32.5 G/DL (ref 28.4–34.8)
MCV RBC AUTO: 91.3 FL (ref 82.6–102.9)
MONOCYTES NFR BLD: 0.89 K/UL (ref 0.1–1.2)
MONOCYTES NFR BLD: 12 % (ref 3–12)
NEUTROPHILS NFR BLD: 71 % (ref 36–65)
NEUTS SEG NFR BLD: 5.59 K/UL (ref 1.5–8.1)
NITRITE UR QL STRIP: NEGATIVE
NRBC AUTOMATED: 0 PER 100 WBC
PH UR STRIP: 5.5 [PH] (ref 5–8)
PLATELET # BLD AUTO: 237 K/UL (ref 138–453)
PMV BLD AUTO: 9.3 FL (ref 8.1–13.5)
POTASSIUM SERPL-SCNC: 4.6 MMOL/L (ref 3.7–5.3)
PROT UR STRIP-MCNC: ABNORMAL MG/DL
RBC # BLD AUTO: 4.14 M/UL (ref 4.21–5.77)
RBC #/AREA URNS HPF: NORMAL /HPF (ref 0–4)
SODIUM SERPL-SCNC: 132 MMOL/L (ref 135–144)
SP GR UR STRIP: 1.03 (ref 1–1.03)
TSH SERPL-MCNC: 1.71 UIU/ML (ref 0.3–5)
UROBILINOGEN UR STRIP-ACNC: NORMAL
VIT B12 SERPL-MCNC: 438 PG/ML (ref 232–1245)
WBC #/AREA URNS HPF: NORMAL /HPF (ref 0–5)
WBC OTHER # BLD: 7.7 K/UL (ref 3.5–11.3)

## 2023-06-15 PROCEDURE — 6360000002 HC RX W HCPCS: Performed by: STUDENT IN AN ORGANIZED HEALTH CARE EDUCATION/TRAINING PROGRAM

## 2023-06-15 PROCEDURE — 93005 ELECTROCARDIOGRAM TRACING: CPT

## 2023-06-15 PROCEDURE — 36415 COLL VENOUS BLD VENIPUNCTURE: CPT

## 2023-06-15 PROCEDURE — 84425 ASSAY OF VITAMIN B-1: CPT

## 2023-06-15 PROCEDURE — 93010 ELECTROCARDIOGRAM REPORT: CPT | Performed by: INTERNAL MEDICINE

## 2023-06-15 PROCEDURE — 82607 VITAMIN B-12: CPT

## 2023-06-15 PROCEDURE — 84443 ASSAY THYROID STIM HORMONE: CPT

## 2023-06-15 PROCEDURE — 99222 1ST HOSP IP/OBS MODERATE 55: CPT | Performed by: PSYCHIATRY & NEUROLOGY

## 2023-06-15 PROCEDURE — 82947 ASSAY GLUCOSE BLOOD QUANT: CPT

## 2023-06-15 PROCEDURE — 82746 ASSAY OF FOLIC ACID SERUM: CPT

## 2023-06-15 PROCEDURE — 85027 COMPLETE CBC AUTOMATED: CPT

## 2023-06-15 PROCEDURE — 95819 EEG AWAKE AND ASLEEP: CPT

## 2023-06-15 PROCEDURE — 80048 BASIC METABOLIC PNL TOTAL CA: CPT

## 2023-06-15 PROCEDURE — 81001 URINALYSIS AUTO W/SCOPE: CPT

## 2023-06-15 PROCEDURE — 6370000000 HC RX 637 (ALT 250 FOR IP)

## 2023-06-15 PROCEDURE — 83036 HEMOGLOBIN GLYCOSYLATED A1C: CPT

## 2023-06-15 PROCEDURE — 2060000000 HC ICU INTERMEDIATE R&B

## 2023-06-15 PROCEDURE — 6370000000 HC RX 637 (ALT 250 FOR IP): Performed by: STUDENT IN AN ORGANIZED HEALTH CARE EDUCATION/TRAINING PROGRAM

## 2023-06-15 PROCEDURE — 92523 SPEECH SOUND LANG COMPREHEN: CPT

## 2023-06-15 RX ORDER — ESCITALOPRAM OXALATE 10 MG/1
10 TABLET ORAL DAILY
Status: DISCONTINUED | OUTPATIENT
Start: 2023-06-15 | End: 2023-06-17 | Stop reason: HOSPADM

## 2023-06-15 RX ORDER — ASPIRIN 81 MG/1
81 TABLET ORAL DAILY
Status: DISCONTINUED | OUTPATIENT
Start: 2023-06-15 | End: 2023-06-17 | Stop reason: HOSPADM

## 2023-06-15 RX ORDER — INSULIN LISPRO 100 [IU]/ML
0-4 INJECTION, SOLUTION INTRAVENOUS; SUBCUTANEOUS NIGHTLY
Status: DISCONTINUED | OUTPATIENT
Start: 2023-06-15 | End: 2023-06-16

## 2023-06-15 RX ORDER — QUETIAPINE FUMARATE 25 MG/1
12.5 TABLET, FILM COATED ORAL ONCE
Status: COMPLETED | OUTPATIENT
Start: 2023-06-16 | End: 2023-06-15

## 2023-06-15 RX ORDER — DEXTROSE MONOHYDRATE 100 MG/ML
INJECTION, SOLUTION INTRAVENOUS CONTINUOUS PRN
Status: DISCONTINUED | OUTPATIENT
Start: 2023-06-15 | End: 2023-06-17 | Stop reason: HOSPADM

## 2023-06-15 RX ORDER — ONDANSETRON 2 MG/ML
4 INJECTION INTRAMUSCULAR; INTRAVENOUS EVERY 6 HOURS PRN
Status: DISCONTINUED | OUTPATIENT
Start: 2023-06-15 | End: 2023-06-17 | Stop reason: HOSPADM

## 2023-06-15 RX ORDER — POLYETHYLENE GLYCOL 3350 17 G/17G
17 POWDER, FOR SOLUTION ORAL DAILY PRN
Status: DISCONTINUED | OUTPATIENT
Start: 2023-06-15 | End: 2023-06-17 | Stop reason: HOSPADM

## 2023-06-15 RX ORDER — EZETIMIBE 10 MG/1
10 TABLET ORAL NIGHTLY
Status: DISCONTINUED | OUTPATIENT
Start: 2023-06-15 | End: 2023-06-17 | Stop reason: HOSPADM

## 2023-06-15 RX ORDER — ENOXAPARIN SODIUM 100 MG/ML
40 INJECTION SUBCUTANEOUS DAILY
Status: DISCONTINUED | OUTPATIENT
Start: 2023-06-15 | End: 2023-06-17

## 2023-06-15 RX ORDER — CLOPIDOGREL BISULFATE 75 MG/1
75 TABLET ORAL DAILY
Status: DISCONTINUED | OUTPATIENT
Start: 2023-06-15 | End: 2023-06-17

## 2023-06-15 RX ORDER — INSULIN LISPRO 100 [IU]/ML
0-4 INJECTION, SOLUTION INTRAVENOUS; SUBCUTANEOUS
Status: DISCONTINUED | OUTPATIENT
Start: 2023-06-15 | End: 2023-06-16

## 2023-06-15 RX ORDER — ONDANSETRON 4 MG/1
4 TABLET, ORALLY DISINTEGRATING ORAL EVERY 8 HOURS PRN
Status: DISCONTINUED | OUTPATIENT
Start: 2023-06-15 | End: 2023-06-17 | Stop reason: HOSPADM

## 2023-06-15 RX ADMIN — INSULIN LISPRO 1 UNITS: 100 INJECTION, SOLUTION INTRAVENOUS; SUBCUTANEOUS at 13:08

## 2023-06-15 RX ADMIN — CLOPIDOGREL BISULFATE 75 MG: 75 TABLET, FILM COATED ORAL at 10:51

## 2023-06-15 RX ADMIN — EZETIMIBE 10 MG: 10 TABLET ORAL at 21:12

## 2023-06-15 RX ADMIN — ESCITALOPRAM OXALATE 10 MG: 10 TABLET ORAL at 13:09

## 2023-06-15 RX ADMIN — QUETIAPINE FUMARATE 12.5 MG: 25 TABLET ORAL at 23:51

## 2023-06-15 RX ADMIN — Medication 81 MG: at 10:52

## 2023-06-15 RX ADMIN — ENOXAPARIN SODIUM 40 MG: 40 INJECTION SUBCUTANEOUS at 10:52

## 2023-06-15 ASSESSMENT — PAIN SCALES - GENERAL
PAINLEVEL_OUTOF10: 0
PAINLEVEL_OUTOF10: 0

## 2023-06-15 NOTE — PROCEDURES
EEG REPORT       Patient: Senait Padilla Age: 66 y.o. MRN: [id number]      Referring Provider: Karen Francois MD    History: This routine 30 minute scalp EEG was recorded with video- monitoring for a 66 y.o.. male who presented with encephalopathy. This EEG was performed to evaluate for focal and epileptiform abnormalities. Pepe Blanca   Current Facility-Administered Medications   Medication Dose Route Frequency Provider Last Rate Last Admin    ondansetron (ZOFRAN-ODT) disintegrating tablet 4 mg  4 mg Oral Q8H PRN Naida Gaines MD        Or    ondansetron (ZOFRAN) injection 4 mg  4 mg IntraVENous Q6H PRN Naida Gaines MD        polyethylene glycol (GLYCOLAX) packet 17 g  17 g Oral Daily PRN Naida Gaines MD        enoxaparin (LOVENOX) injection 40 mg  40 mg SubCUTAneous Daily Naida Gaines MD   40 mg at 06/15/23 1052    clopidogrel (PLAVIX) tablet 75 mg  75 mg Oral Daily Naida Gaines MD   75 mg at 06/15/23 1051    aspirin EC tablet 81 mg  81 mg Oral Daily Naida Gaines MD   81 mg at 06/15/23 1052    ezetimibe (ZETIA) tablet 10 mg  10 mg Oral Nightly Naida Gaines MD        insulin lispro (HUMALOG) injection vial 0-4 Units  0-4 Units SubCUTAneous TID WC Yahaira Wright DO   1 Units at 06/15/23 1308    insulin lispro (HUMALOG) injection vial 0-4 Units  0-4 Units SubCUTAneous Nightly Yahaira Wright DO        glucose chewable tablet 16 g  4 tablet Oral PRN Yahaira Wright DO        dextrose bolus 10% 125 mL  125 mL IntraVENous PRN Mecca Gaines DO        Or    dextrose bolus 10% 250 mL  250 mL IntraVENous PRN Yahaira Wright DO        glucagon (rDNA) injection 1 mg  1 mg SubCUTAneous PRN Yahaira Wright DO        dextrose 10 % infusion   IntraVENous Continuous PRN Yahaira Wright DO        escitalopram (LEXAPRO) tablet 10 mg  10 mg Oral Daily Yahaira Wright DO   10 mg at 06/15/23 1309       Technical Description:  This is a 21 channel digital EEG

## 2023-06-15 NOTE — H&P
Medications Prior to Admission:     Prior to Admission medications    Medication Sig Start Date End Date Taking? Authorizing Provider   amiodarone (CORDARONE) 200 MG tablet Take 1 tablet by mouth daily 12/15/22 1/14/23  Cesar Mendez, DO   ezetimibe (ZETIA) 10 MG tablet Take 1 tablet by mouth nightly 12/14/22   Cesar Mendez, DO   ferrous sulfate (FE TABS 325) 325 (65 Fe) MG EC tablet Take 1 tablet by mouth 2 times daily (with meals) 12/14/22   Cesar Mendez, DO   metoprolol tartrate (LOPRESSOR) 25 MG tablet Take 1 tablet by mouth 2 times daily 12/14/22   Cesar Mendez, DO   sucralfate (CARAFATE) 1 GM tablet Take 1 tablet by mouth 4 times daily (before meals and nightly) 12/14/22   Cesar Mendez, DO   tamsulosin [facility]) 0.4 MG capsule Take 1 capsule by mouth daily  Patient not taking: Reported on 1/30/2023 12/15/22   Cesar Mendez, DO   potassium chloride (KLOR-CON M) 20 MEQ extended release tablet Take 1 tablet by mouth 2 times daily 12/14/22 4/24/23  Cesar Mendez, DO   furosemide (LASIX) 40 MG tablet Take 3 tablets by mouth daily  Patient taking differently: Take 0.5 tablets by mouth daily 12/14/22 12/14/23  Cesar Mendez, DO   escitalopram (LEXAPRO) 10 MG tablet Take 1 tablet by mouth daily    Historical Provider, MD   metFORMIN (GLUCOPHAGE-XR) 500 MG extended release tablet Take 1 tablet by mouth in the morning and at bedtime    Historical Provider, MD   aspirin 81 MG EC tablet Take 1 tablet by mouth daily    Historical Provider, MD   rosuvastatin (CRESTOR) 5 MG tablet Take 1 tablet by mouth daily    Historical Provider, MD   omeprazole (PRILOSEC) 20 MG delayed release capsule Take 1 capsule by mouth 2 times daily    Historical Provider, MD        Allergies:     Atorvastatin    Social History:     Tobacco:    reports that he has quit smoking. His smoking use included cigarettes. He has never used smokeless tobacco.  Alcohol:      reports that he does not currently use alcohol. Drug Use:  reports no history of drug use.     Family

## 2023-06-15 NOTE — PROGRESS NOTES
SLP ALL NOTES  Facility/Department: [address] [facility]  Initial Speech/Language/Cognitive Assessment    NAME: Za Estes  : 1945   MRN: [id number]  ADMISSION DATE: 6/15/2023  ADMITTING DIAGNOSIS: has Gastrointestinal hemorrhage with hematemesis; Atrial fibrillation with rapid ventricular response ([address] Essential hypertension; S/P CABG (coronary artery bypass graft); Anemia due to blood loss, acute; Type 2 diabetes mellitus with hyperglycemia, with long-term current use of insulin ([address] Hemorrhagic shock ([address] Orthostatic dizziness; Lethargy; Demand ischemia ([address] CHF exacerbation ([address] Acute respiratory failure with hypoxia ([address] and Expressive aphasia on their problem list.    Date of Eval: 6/15/2023   Evaluating Therapist: Shaina Rivers    Primary Complaint: The patient is a 66 y.o. Non- / non  male was transferred from [facility] for stroke work-up. He has history of diabetes, CABG, A-fib not on anticoagulation due to GI bleed but is on aspirin 81 mg, hypertension, carotid endarterectomy   According to the records, the patient had an episode of confusion and was not speaking properly. History was provided by the wife while he was at [facility]. .The symptoms began in the afternoon of 2023. This episode is unusual for the patient as he was acting normally with the previous day. He woke up on the day of presentation feeling nauseous and has not eaten or taken his insulin. He did report dizziness and headache. CT head without contrast was negative. CTA head and neck showed no significant vessel stenosis.   He was loaded with dual antiplatelets then transferred    Pain:  Pain Assessment  Pain Assessment: None - Denies Pain    Vision/ Hearing  Vision  Vision: Within Functional Limits  Hearing  Hearing: Within functional limits    Assessment:    Pt presents with moderate-severe expressive and receptive language deficits characterized by difficulty with two step commands,

## 2023-06-16 ENCOUNTER — APPOINTMENT (OUTPATIENT)
Dept: ULTRASOUND IMAGING | Age: 78
DRG: 093 | End: 2023-06-16
Attending: PSYCHIATRY & NEUROLOGY
Payer: MEDICARE

## 2023-06-16 ENCOUNTER — APPOINTMENT (OUTPATIENT)
Dept: MRI IMAGING | Age: 78
DRG: 093 | End: 2023-06-16
Attending: PSYCHIATRY & NEUROLOGY
Payer: MEDICARE

## 2023-06-16 LAB
ANION GAP SERPL CALCULATED.3IONS-SCNC: 13 MMOL/L (ref 9–17)
BASOPHILS # BLD: 0.05 K/UL (ref 0–0.2)
BASOPHILS NFR BLD: 1 % (ref 0–2)
BUN SERPL-MCNC: 29 MG/DL (ref 8–23)
CALCIUM SERPL-MCNC: 9.3 MG/DL (ref 8.6–10.4)
CHLORIDE SERPL-SCNC: 101 MMOL/L (ref 98–107)
CHOLEST SERPL-MCNC: 146 MG/DL
CHOLESTEROL/HDL RATIO: 5
CO2 SERPL-SCNC: 19 MMOL/L (ref 20–31)
CREAT SERPL-MCNC: 1.41 MG/DL (ref 0.7–1.2)
EOSINOPHIL # BLD: 0.09 K/UL (ref 0–0.44)
EOSINOPHILS RELATIVE PERCENT: 1 % (ref 1–4)
ERYTHROCYTE [DISTWIDTH] IN BLOOD BY AUTOMATED COUNT: 13.1 % (ref 11.8–14.4)
GFR SERPL CREATININE-BSD FRML MDRD: 51 ML/MIN/1.73M2
GLUCOSE BLD-MCNC: 201 MG/DL (ref 75–110)
GLUCOSE BLD-MCNC: 253 MG/DL (ref 75–110)
GLUCOSE BLD-MCNC: 276 MG/DL (ref 75–110)
GLUCOSE BLD-MCNC: 284 MG/DL (ref 75–110)
GLUCOSE SERPL-MCNC: 200 MG/DL (ref 70–99)
HCT VFR BLD AUTO: 36.5 % (ref 40.7–50.3)
HDLC SERPL-MCNC: 29 MG/DL
HGB BLD-MCNC: 12 G/DL (ref 13–17)
IMM GRANULOCYTES # BLD AUTO: <0.03 K/UL (ref 0–0.3)
IMM GRANULOCYTES NFR BLD: 0 %
LDLC SERPL CALC-MCNC: 68 MG/DL (ref 0–130)
LYMPHOCYTES # BLD: 22 % (ref 24–43)
LYMPHOCYTES NFR BLD: 1.49 K/UL (ref 1.1–3.7)
MCH RBC QN AUTO: 30.5 PG (ref 25.2–33.5)
MCHC RBC AUTO-ENTMCNC: 32.9 G/DL (ref 28.4–34.8)
MCV RBC AUTO: 92.9 FL (ref 82.6–102.9)
MONOCYTES NFR BLD: 0.96 K/UL (ref 0.1–1.2)
MONOCYTES NFR BLD: 15 % (ref 3–12)
NEUTROPHILS NFR BLD: 61 % (ref 36–65)
NEUTS SEG NFR BLD: 4.04 K/UL (ref 1.5–8.1)
NRBC AUTOMATED: 0 PER 100 WBC
PLATELET # BLD AUTO: 230 K/UL (ref 138–453)
PMV BLD AUTO: 9.5 FL (ref 8.1–13.5)
POTASSIUM SERPL-SCNC: 4.1 MMOL/L (ref 3.7–5.3)
RBC # BLD AUTO: 3.93 M/UL (ref 4.21–5.77)
SODIUM SERPL-SCNC: 133 MMOL/L (ref 135–144)
TRIGL SERPL-MCNC: 243 MG/DL
WBC OTHER # BLD: 6.6 K/UL (ref 3.5–11.3)

## 2023-06-16 PROCEDURE — 6360000002 HC RX W HCPCS: Performed by: STUDENT IN AN ORGANIZED HEALTH CARE EDUCATION/TRAINING PROGRAM

## 2023-06-16 PROCEDURE — 85027 COMPLETE CBC AUTOMATED: CPT

## 2023-06-16 PROCEDURE — 6370000000 HC RX 637 (ALT 250 FOR IP): Performed by: STUDENT IN AN ORGANIZED HEALTH CARE EDUCATION/TRAINING PROGRAM

## 2023-06-16 PROCEDURE — 70553 MRI BRAIN STEM W/O & W/DYE: CPT

## 2023-06-16 PROCEDURE — 80061 LIPID PANEL: CPT

## 2023-06-16 PROCEDURE — 97162 PT EVAL MOD COMPLEX 30 MIN: CPT

## 2023-06-16 PROCEDURE — 97166 OT EVAL MOD COMPLEX 45 MIN: CPT

## 2023-06-16 PROCEDURE — 97530 THERAPEUTIC ACTIVITIES: CPT

## 2023-06-16 PROCEDURE — A9579 GAD-BASE MR CONTRAST NOS,1ML: HCPCS | Performed by: STUDENT IN AN ORGANIZED HEALTH CARE EDUCATION/TRAINING PROGRAM

## 2023-06-16 PROCEDURE — 76775 US EXAM ABDO BACK WALL LIM: CPT

## 2023-06-16 PROCEDURE — 99232 SBSQ HOSP IP/OBS MODERATE 35: CPT | Performed by: PSYCHIATRY & NEUROLOGY

## 2023-06-16 PROCEDURE — 2580000003 HC RX 258: Performed by: STUDENT IN AN ORGANIZED HEALTH CARE EDUCATION/TRAINING PROGRAM

## 2023-06-16 PROCEDURE — 6370000000 HC RX 637 (ALT 250 FOR IP)

## 2023-06-16 PROCEDURE — 82570 ASSAY OF URINE CREATININE: CPT

## 2023-06-16 PROCEDURE — 6360000004 HC RX CONTRAST MEDICATION: Performed by: STUDENT IN AN ORGANIZED HEALTH CARE EDUCATION/TRAINING PROGRAM

## 2023-06-16 PROCEDURE — 97535 SELF CARE MNGMENT TRAINING: CPT

## 2023-06-16 PROCEDURE — 36415 COLL VENOUS BLD VENIPUNCTURE: CPT

## 2023-06-16 PROCEDURE — 2060000000 HC ICU INTERMEDIATE R&B

## 2023-06-16 PROCEDURE — 82947 ASSAY GLUCOSE BLOOD QUANT: CPT

## 2023-06-16 PROCEDURE — 2580000003 HC RX 258

## 2023-06-16 PROCEDURE — 84300 ASSAY OF URINE SODIUM: CPT

## 2023-06-16 PROCEDURE — 80048 BASIC METABOLIC PNL TOTAL CA: CPT

## 2023-06-16 RX ORDER — ROSUVASTATIN CALCIUM 5 MG/1
5 TABLET, COATED ORAL DAILY
Status: DISCONTINUED | OUTPATIENT
Start: 2023-06-16 | End: 2023-06-17 | Stop reason: HOSPADM

## 2023-06-16 RX ORDER — SODIUM CHLORIDE 0.9 % (FLUSH) 0.9 %
10 SYRINGE (ML) INJECTION PRN
Status: DISCONTINUED | OUTPATIENT
Start: 2023-06-16 | End: 2023-06-17 | Stop reason: HOSPADM

## 2023-06-16 RX ORDER — PANTOPRAZOLE SODIUM 40 MG/1
40 TABLET, DELAYED RELEASE ORAL
Status: DISCONTINUED | OUTPATIENT
Start: 2023-06-17 | End: 2023-06-17 | Stop reason: HOSPADM

## 2023-06-16 RX ORDER — SODIUM CHLORIDE 9 MG/ML
INJECTION, SOLUTION INTRAVENOUS CONTINUOUS
Status: CANCELLED | OUTPATIENT
Start: 2023-06-16

## 2023-06-16 RX ORDER — SODIUM CHLORIDE 9 MG/ML
INJECTION, SOLUTION INTRAVENOUS CONTINUOUS
Status: DISCONTINUED | OUTPATIENT
Start: 2023-06-16 | End: 2023-06-17

## 2023-06-16 RX ORDER — INSULIN LISPRO 100 [IU]/ML
0-4 INJECTION, SOLUTION INTRAVENOUS; SUBCUTANEOUS NIGHTLY
Status: DISCONTINUED | OUTPATIENT
Start: 2023-06-16 | End: 2023-06-17

## 2023-06-16 RX ORDER — LANOLIN ALCOHOL/MO/W.PET/CERES
325 CREAM (GRAM) TOPICAL 2 TIMES DAILY WITH MEALS
Status: DISCONTINUED | OUTPATIENT
Start: 2023-06-16 | End: 2023-06-17 | Stop reason: HOSPADM

## 2023-06-16 RX ORDER — SUCRALFATE 1 G/1
1 TABLET ORAL
Status: DISCONTINUED | OUTPATIENT
Start: 2023-06-16 | End: 2023-06-17 | Stop reason: HOSPADM

## 2023-06-16 RX ORDER — INSULIN LISPRO 100 [IU]/ML
0-8 INJECTION, SOLUTION INTRAVENOUS; SUBCUTANEOUS
Status: DISCONTINUED | OUTPATIENT
Start: 2023-06-16 | End: 2023-06-17

## 2023-06-16 RX ADMIN — SODIUM CHLORIDE: 9 INJECTION, SOLUTION INTRAVENOUS at 16:14

## 2023-06-16 RX ADMIN — GADOTERIDOL 20 ML: 279.3 INJECTION, SOLUTION INTRAVENOUS at 18:46

## 2023-06-16 RX ADMIN — FERROUS SULFATE TAB EC 325 MG (65 MG FE EQUIVALENT) 325 MG: 325 (65 FE) TABLET DELAYED RESPONSE at 17:03

## 2023-06-16 RX ADMIN — EZETIMIBE 10 MG: 10 TABLET ORAL at 20:58

## 2023-06-16 RX ADMIN — ROSUVASTATIN CALCIUM 5 MG: 5 TABLET, FILM COATED ORAL at 17:04

## 2023-06-16 RX ADMIN — ESCITALOPRAM OXALATE 10 MG: 10 TABLET ORAL at 08:55

## 2023-06-16 RX ADMIN — ENOXAPARIN SODIUM 40 MG: 40 INJECTION SUBCUTANEOUS at 08:54

## 2023-06-16 RX ADMIN — INSULIN LISPRO 1 UNITS: 100 INJECTION, SOLUTION INTRAVENOUS; SUBCUTANEOUS at 08:55

## 2023-06-16 RX ADMIN — SUCRALFATE 1 G: 1 TABLET ORAL at 20:58

## 2023-06-16 RX ADMIN — CLOPIDOGREL BISULFATE 75 MG: 75 TABLET, FILM COATED ORAL at 08:54

## 2023-06-16 RX ADMIN — Medication 81 MG: at 08:55

## 2023-06-16 RX ADMIN — SUCRALFATE 1 G: 1 TABLET ORAL at 17:03

## 2023-06-16 RX ADMIN — SODIUM CHLORIDE, PRESERVATIVE FREE 10 ML: 5 INJECTION INTRAVENOUS at 18:46

## 2023-06-16 RX ADMIN — INSULIN LISPRO 4 UNITS: 100 INJECTION, SOLUTION INTRAVENOUS; SUBCUTANEOUS at 17:02

## 2023-06-16 RX ADMIN — INSULIN LISPRO 2 UNITS: 100 INJECTION, SOLUTION INTRAVENOUS; SUBCUTANEOUS at 12:12

## 2023-06-16 ASSESSMENT — PAIN SCALES - GENERAL
PAINLEVEL_OUTOF10: 0
PAINLEVEL_OUTOF10: 0

## 2023-06-16 NOTE — PROGRESS NOTES
NEUROLOGY INPATIENT PROGRESS NOTE    6/16/2023         Subjective:   Vandana Haskins is a  66 y.o. male admitted on 6/15/2023 with Expressive aphasia [R47.01]    Interval History:  Briefly, this is a  66 y.o. male with PMHx of DM, HTN, CABG, Afib off AC due to GI bleed, on ASA 81 mg po qd at home, admitted on 6/15/2023 with acute onset AMS consisting of confusion, expressive aphasia, and disorientation for a couple of days. Pt reportedly had recent steroid injection for back pain during ED visit within the last week, then felt tired for 1-2 days, and then had episode of confusion. Mentation has been waxing and waning since. Wife denies any hx of dementia or confusion in patient other than sundowning episodes during past hospitalizations. There is no history of strokes, seizures, or migraines in patient. Wife and patient deny patient having any personal or family history of neurologic disorders. Pt initially seen at [facility] and was loaded with DAPT prior to transfer. CTH and CTA H/N were unremarkable. Initial NIH 2 for aphasia. Today, pt seen and examined. He is alert and oriented x2. Mentation improved but still not at baseline. Pt does admit to confusion. Had some agitation overnight; was given seroquel. MRI pending. ROS:    Review of Systems   Neurological:  Negative for seizures, weakness and headaches. Psychiatric/Behavioral:  Positive for confusion. No current facility-administered medications on file prior to encounter.      Current Outpatient Medications on File Prior to Encounter   Medication Sig Dispense Refill    amiodarone (CORDARONE) 200 MG tablet Take 1 tablet by mouth daily 30 tablet 0    ezetimibe (ZETIA) 10 MG tablet Take 1 tablet by mouth nightly 30 tablet 3    ferrous sulfate (FE TABS 325) 325 (65 Fe) MG EC tablet Take 1 tablet by mouth 2 times daily (with meals) 90 tablet 3    metoprolol tartrate (LOPRESSOR) 25 MG tablet Take 1 tablet by mouth 2 times daily 60 tablet 3

## 2023-06-16 NOTE — PROGRESS NOTES
Occupational Therapy  [facility] [address] [facility]  Occupational Therapy Initial Assessment    Name: Alexis Cassidy  : 1945  MRN: [id number]  Date of Service: 2023    Copied from chart: \"The patient is a 66 y.o. Non- / non  male was transferred from [facility] for stroke work-up. He has history of diabetes, CABG, A-fib not on anticoagulation due to GI bleed but is on aspirin 81 mg, hypertension, carotid endarterectomy   According to the records, the patient had an episode of confusion and was not speaking properly. History was provided by the wife while he was at [facility]. .The symptoms began in the afternoon of 2023. This episode is unusual for the patient as he was acting normally with the previous day. He woke up on the day of presentation feeling nauseous and has not eaten or taken his insulin. He did report dizziness and headache. CT head without contrast was negative. CTA head and neck showed no significant vessel stenosis. He was loaded with dual antiplatelets then transferred\"    Discharge Recommendations:  Patient would benefit from continued therapy after discharge          Patient Diagnosis(es): There were no encounter diagnoses. Past Medical History:  has a past medical history of A-fib ([facility] [address] CAD (coronary artery disease), Depression, DM2 (diabetes mellitus, type 2) ([facility] [address] and HTN (hypertension). Past Surgical History:  has a past surgical history that includes Upper gastrointestinal endoscopy (N/A, 2022); Coronary artery bypass graft; Carotid endarterectomy (Right); Carpal tunnel release; Cholecystectomy; Cardiac catheterization; and Colonoscopy. Assessment   Performance deficits / Impairments: Decreased functional mobility ; Decreased ADL status; Decreased safe awareness;Decreased cognition;Decreased balance;Decreased high-level IADLs  Assessment: Pt agreed to OT this date.  Pt engaged in bed mobility, functional transfers,

## 2023-06-16 NOTE — PROGRESS NOTES
Physical Therapy  Facility/Department: [address] [facility]  Physical Therapy Initial Assessment    Name: Josue Molina  : 1945  MRN: [id number]  Date of Service: 2023    Discharge Recommendations: Further therapy recommended at discharge. No chief complaint on file. The patient is a 66 y.o. Non- / non  male was transferred from [facility] for stroke work-up. He has history of diabetes, CABG, A-fib not on anticoagulation due to GI bleed but is on aspirin 81 mg, hypertension, carotid endarterectomy   According to the records, the patient had an episode of confusion and was not speaking properly. History was provided by the wife while he was at [facility]. .The symptoms began in the afternoon of 2023. This episode is unusual for the patient as he was acting normally with the previous day. He woke up on the day of presentation feeling nauseous and has not eaten or taken his insulin. He did report dizziness and headache. CT head without contrast was negative. CTA head and neck showed no significant vessel stenosis. PT Equipment Recommendations  Equipment Needed: No (owns RW)      Patient Diagnosis(es): There were no encounter diagnoses. Past Medical History:  has a past medical history of A-fib ([facility] [address] CAD (coronary artery disease), Depression, DM2 (diabetes mellitus, type 2) ([facility] [address] and HTN (hypertension). Past Surgical History:  has a past surgical history that includes Upper gastrointestinal endoscopy (N/A, 2022); Coronary artery bypass graft; Carotid endarterectomy (Right); Carpal tunnel release; Cholecystectomy; Cardiac catheterization; and Colonoscopy. Assessment   Body Structures, Functions, Activity Limitations Requiring Skilled Therapeutic Intervention: Decreased functional mobility ; Decreased ADL status; Decreased cognition;Decreased endurance;Decreased body mechanics; Decreased balance;Decreased coordination;Decreased high-level IADLs;Decreased

## 2023-06-16 NOTE — PROGRESS NOTES
[facility] [address]  PROGRESS NOTE    Shift date: 6/16/2023  Shift day: Friday   Shift # 1    Room # 6614/9108-69   Name: Diana Ball                Restorationism: [facility] [employer]: unknown    Referral: Routine Visit    Admit Date & Time: 6/15/2023 12:05 AM    Assessment:  Diana Ball is a 66 y.o. male in the hospital because issues functioning and performing simple tasks. Upon entering the room writer observes Pt's wife, Pablo Akbar, sitting alone. Wife appeared to be distressed and very stressed. Pt was not present immediately because was getting test performed.  sat down with wife and Pt joined the conversation once test was complete. Because of some family dynamics, pt's children have not come to visit. Pt seems tired but calm. The challenged that seems to be causing the most stress in not knowing what caused Pt's symptoms       Intervention:  Writer introduced self and title as  Writer offered space for Pt and wife  to express feelings, needs, and concerns and provided a ministry presence.  listened and offer support to wife who is expressing stress and concern. Outcome:  Pt and wife expressed gratitude for visit. Plan:  Chaplains will remain available to offer spiritual and emotional support as needed.       Electronically signed by Vj Rowland on 6/16/2023 at 4:48 PM.  Dwayne Rey  [phone]

## 2023-06-17 VITALS
HEART RATE: 65 BPM | SYSTOLIC BLOOD PRESSURE: 151 MMHG | DIASTOLIC BLOOD PRESSURE: 94 MMHG | RESPIRATION RATE: 14 BRPM | HEIGHT: 74 IN | TEMPERATURE: 98.2 F | BODY MASS INDEX: 25.75 KG/M2 | WEIGHT: 200.62 LBS | OXYGEN SATURATION: 97 %

## 2023-06-17 PROBLEM — R47.01 EXPRESSIVE APHASIA: Status: RESOLVED | Noted: 2023-06-15 | Resolved: 2023-06-17

## 2023-06-17 PROBLEM — G93.40 ENCEPHALOPATHY ACUTE: Status: ACTIVE | Noted: 2023-06-17

## 2023-06-17 LAB
ANION GAP SERPL CALCULATED.3IONS-SCNC: 12 MMOL/L (ref 9–17)
BASOPHILS # BLD: 0.04 K/UL (ref 0–0.2)
BASOPHILS NFR BLD: 1 % (ref 0–2)
BUN SERPL-MCNC: 30 MG/DL (ref 8–23)
CALCIUM SERPL-MCNC: 9.1 MG/DL (ref 8.6–10.4)
CHLORIDE SERPL-SCNC: 105 MMOL/L (ref 98–107)
CO2 SERPL-SCNC: 16 MMOL/L (ref 20–31)
CREAT SERPL-MCNC: 1.26 MG/DL (ref 0.7–1.2)
CREAT UR-MCNC: 117.1 MG/DL (ref 39–259)
EOSINOPHIL # BLD: 0.11 K/UL (ref 0–0.44)
EOSINOPHILS RELATIVE PERCENT: 2 % (ref 1–4)
ERYTHROCYTE [DISTWIDTH] IN BLOOD BY AUTOMATED COUNT: 13 % (ref 11.8–14.4)
GFR SERPL CREATININE-BSD FRML MDRD: 58 ML/MIN/1.73M2
GLUCOSE BLD-MCNC: 165 MG/DL (ref 75–110)
GLUCOSE BLD-MCNC: 270 MG/DL (ref 75–110)
GLUCOSE SERPL-MCNC: 278 MG/DL (ref 70–99)
HCT VFR BLD AUTO: 36.6 % (ref 40.7–50.3)
HGB BLD-MCNC: 12.2 G/DL (ref 13–17)
IMM GRANULOCYTES # BLD AUTO: <0.03 K/UL (ref 0–0.3)
IMM GRANULOCYTES NFR BLD: 0 %
LYMPHOCYTES # BLD: 19 % (ref 24–43)
LYMPHOCYTES NFR BLD: 1.26 K/UL (ref 1.1–3.7)
MCH RBC QN AUTO: 30.7 PG (ref 25.2–33.5)
MCHC RBC AUTO-ENTMCNC: 33.3 G/DL (ref 28.4–34.8)
MCV RBC AUTO: 92.2 FL (ref 82.6–102.9)
MONOCYTES NFR BLD: 0.86 K/UL (ref 0.1–1.2)
MONOCYTES NFR BLD: 13 % (ref 3–12)
NEUTROPHILS NFR BLD: 65 % (ref 36–65)
NEUTS SEG NFR BLD: 4.4 K/UL (ref 1.5–8.1)
NRBC AUTOMATED: 0 PER 100 WBC
PLATELET # BLD AUTO: 225 K/UL (ref 138–453)
PMV BLD AUTO: 9.5 FL (ref 8.1–13.5)
POTASSIUM SERPL-SCNC: 4.2 MMOL/L (ref 3.7–5.3)
RBC # BLD AUTO: 3.97 M/UL (ref 4.21–5.77)
SODIUM SERPL-SCNC: 133 MMOL/L (ref 135–144)
SODIUM UR-SCNC: 67 MMOL/L
WBC OTHER # BLD: 6.7 K/UL (ref 3.5–11.3)

## 2023-06-17 PROCEDURE — 36415 COLL VENOUS BLD VENIPUNCTURE: CPT

## 2023-06-17 PROCEDURE — 80048 BASIC METABOLIC PNL TOTAL CA: CPT

## 2023-06-17 PROCEDURE — 99239 HOSP IP/OBS DSCHRG MGMT >30: CPT | Performed by: PSYCHIATRY & NEUROLOGY

## 2023-06-17 PROCEDURE — 85027 COMPLETE CBC AUTOMATED: CPT

## 2023-06-17 PROCEDURE — 82947 ASSAY GLUCOSE BLOOD QUANT: CPT

## 2023-06-17 PROCEDURE — 6360000002 HC RX W HCPCS: Performed by: STUDENT IN AN ORGANIZED HEALTH CARE EDUCATION/TRAINING PROGRAM

## 2023-06-17 PROCEDURE — 6370000000 HC RX 637 (ALT 250 FOR IP): Performed by: STUDENT IN AN ORGANIZED HEALTH CARE EDUCATION/TRAINING PROGRAM

## 2023-06-17 PROCEDURE — 6370000000 HC RX 637 (ALT 250 FOR IP)

## 2023-06-17 PROCEDURE — 6370000000 HC RX 637 (ALT 250 FOR IP): Performed by: FAMILY MEDICINE

## 2023-06-17 RX ORDER — INSULIN LISPRO 100 [IU]/ML
0-16 INJECTION, SOLUTION INTRAVENOUS; SUBCUTANEOUS
Status: DISCONTINUED | OUTPATIENT
Start: 2023-06-17 | End: 2023-06-17 | Stop reason: HOSPADM

## 2023-06-17 RX ORDER — INSULIN LISPRO 100 [IU]/ML
0-4 INJECTION, SOLUTION INTRAVENOUS; SUBCUTANEOUS NIGHTLY
Status: DISCONTINUED | OUTPATIENT
Start: 2023-06-17 | End: 2023-06-17 | Stop reason: HOSPADM

## 2023-06-17 RX ORDER — ENOXAPARIN SODIUM 100 MG/ML
40 INJECTION SUBCUTANEOUS DAILY
Status: DISCONTINUED | OUTPATIENT
Start: 2023-06-17 | End: 2023-06-17 | Stop reason: HOSPADM

## 2023-06-17 RX ORDER — ACETAMINOPHEN 325 MG/1
650 TABLET ORAL EVERY 4 HOURS PRN
Status: DISCONTINUED | OUTPATIENT
Start: 2023-06-17 | End: 2023-06-17 | Stop reason: HOSPADM

## 2023-06-17 RX ADMIN — ESCITALOPRAM OXALATE 10 MG: 10 TABLET ORAL at 08:24

## 2023-06-17 RX ADMIN — FERROUS SULFATE TAB EC 325 MG (65 MG FE EQUIVALENT) 325 MG: 325 (65 FE) TABLET DELAYED RESPONSE at 08:24

## 2023-06-17 RX ADMIN — INSULIN LISPRO 4 UNITS: 100 INJECTION, SOLUTION INTRAVENOUS; SUBCUTANEOUS at 08:24

## 2023-06-17 RX ADMIN — PANTOPRAZOLE SODIUM 40 MG: 40 TABLET, DELAYED RELEASE ORAL at 08:24

## 2023-06-17 RX ADMIN — SUCRALFATE 1 G: 1 TABLET ORAL at 08:24

## 2023-06-17 RX ADMIN — ROSUVASTATIN CALCIUM 5 MG: 5 TABLET, FILM COATED ORAL at 08:24

## 2023-06-17 RX ADMIN — SUCRALFATE 1 G: 1 TABLET ORAL at 11:56

## 2023-06-17 RX ADMIN — ACETAMINOPHEN 650 MG: 325 TABLET ORAL at 05:42

## 2023-06-17 RX ADMIN — ENOXAPARIN SODIUM 40 MG: 40 INJECTION SUBCUTANEOUS at 09:57

## 2023-06-17 RX ADMIN — Medication 81 MG: at 08:24

## 2023-06-17 ASSESSMENT — ENCOUNTER SYMPTOMS
ABDOMINAL DISTENTION: 0
NAUSEA: 0
VOMITING: 0
ABDOMINAL PAIN: 0
CHEST TIGHTNESS: 0
WHEEZING: 0
COUGH: 0
CONSTIPATION: 0
SHORTNESS OF BREATH: 0
DIARRHEA: 0
APNEA: 0

## 2023-06-17 ASSESSMENT — PAIN SCALES - GENERAL
PAINLEVEL_OUTOF10: 7
PAINLEVEL_OUTOF10: 0

## 2023-06-17 ASSESSMENT — PAIN DESCRIPTION - DESCRIPTORS: DESCRIPTORS: ACHING

## 2023-06-17 ASSESSMENT — PAIN DESCRIPTION - ORIENTATION: ORIENTATION: UPPER;MID;LOWER;POSTERIOR

## 2023-06-17 ASSESSMENT — PAIN DESCRIPTION - LOCATION: LOCATION: BACK

## 2023-06-17 NOTE — PROGRESS NOTES
Pt discharge to home today, pt voiced understanding of all d/c instructions, wife at bedside and to transport pt

## 2023-06-17 NOTE — DISCHARGE SUMMARY
[facility]     Department of Neurology    INPATIENT DISCHARGE SUMMARY        Patient Identification:  Malka Irby is a 66 y.o. male. :  1945  MRN: [id number]     Acct: [de-identified]   Admit Date:  6/15/2023  Discharge date and time: No discharge date for patient encounter. Attending Provider: Addison Solorio DO                                     Admission Diagnoses:   Expressive aphasia [R47.01]    Discharge Diagnoses:   Principal Problem:    Encephalopathy acute  Resolved Problems:    Expressive aphasia       Consults:   none    Brief Inpatient course:    Patient is a 79-year-old male with PMH of T2DM, HTN, CABG, A-fib off anticoagulation due to history of GI bleed, presented with acute onset altered mentation with some expressive aphasia for couple of days. Patient recently had a steroid injection in the back during an ED visit last week, stated that his back. Was much improved after that but the confusion started about 3 days after the injection. Patient was initially in elevated due to concern for stroke, underwent CT head and CTA head and neck which were unremarkable. Initial NIH was 2 for aphasia. Patient's aphasia resolved a few hours after admission on evaluation the next morning patient appeared to only be disoriented. Patient subsequently underwent EEG which showed mild to moderate encephalopathy and MRI brain which was unremarkable. Over the course of admission patient's mentation significantly improved and he returned to baseline. Hemodynamics and labs were stable throughout the admission. The etiology of the initial encephalopathy remains unclear, however symptoms have resolved now. Patient and spouse advised to follow-up with neurology outpatient for cognitive impairment evaluation. The expressed understanding and agreement with the treatment and follow-up plan. Patient is stable from neurological standpoint to be discharged.     Procedures:  EEG    Any

## 2023-06-17 NOTE — PLAN OF CARE
Problem: Discharge Planning  Goal: Discharge to home or other facility with appropriate resources  6/15/2023 0357 by Irlanda Hickman RN  Outcome: Progressing  6/15/2023 0325 by Irlanda Hickman RN  Outcome: Progressing  Flowsheets  Taken 6/15/2023 0049  Discharge to home or other facility with appropriate resources:   Identify barriers to discharge with patient and caregiver   Arrange for needed discharge resources and transportation as appropriate   Identify discharge learning needs (meds, wound care, etc)   Refer to discharge planning if patient needs post-hospital services based on physician order or complex needs related to functional status, cognitive ability or social support system  Taken 6/15/2023 0006  Discharge to home or other facility with appropriate resources:   Identify barriers to discharge with patient and caregiver   Arrange for needed discharge resources and transportation as appropriate   Identify discharge learning needs (meds, wound care, etc)   Refer to discharge planning if patient needs post-hospital services based on physician order or complex needs related to functional status, cognitive ability or social support system     Problem: Safety - Adult  Goal: Free from fall injury  6/15/2023 0357 by Irlanda Hickman RN  Outcome: Progressing  Flowsheets (Taken 6/15/2023 0356)  Free From Fall Injury:   Instruct family/caregiver on patient safety   Based on caregiver fall risk screen, instruct family/caregiver to ask for assistance with transferring infant if caregiver noted to have fall risk factors  6/15/2023 0325 by Irlanda Hickman RN  Outcome: Progressing     Problem: ABCDS Injury Assessment  Goal: Absence of physical injury  6/15/2023 0357 by Irlanda Hickman RN  Outcome: Progressing  Flowsheets (Taken 6/15/2023 0356)  Absence of Physical Injury: Implement safety measures based on patient assessment  6/15/2023 0325 by Irlanda Hickman RN  Outcome: Progressing
Problem: Discharge Planning  Goal: Discharge to home or other facility with appropriate resources  6/15/2023 1609 by Josey Sheikh RN  Outcome: Progressing     Problem: Safety - Adult  Goal: Free from fall injury  6/15/2023 1609 by Josey Sheikh RN  Outcome: Progressing     Problem: ABCDS Injury Assessment  Goal: Absence of physical injury  6/15/2023 1609 by Josey Sheikh RN  Outcome: Progressing     Problem: Chronic Conditions and Co-morbidities  Goal: Patient's chronic conditions and co-morbidity symptoms are monitored and maintained or improved  Outcome: Progressing     Problem: Pain  Goal: Verbalizes/displays adequate comfort level or baseline comfort level  Outcome: Progressing     Problem: Confusion  Goal: Confusion, delirium, dementia, or psychosis is improved or at baseline  Description: INTERVENTIONS:  1. Assess for possible contributors to thought disturbance, including medications, impaired vision or hearing, underlying metabolic abnormalities, dehydration, psychiatric diagnoses, and notify attending LIP  2. [place] high risk fall precautions, as indicated  3. Provide frequent short contacts to provide reality reorientation, refocusing and direction  4. Decrease environmental stimuli, including noise as appropriate  5. Monitor and intervene to maintain adequate nutrition, hydration, elimination, sleep and activity  6. If unable to ensure safety without constant attention obtain sitter and review sitter guidelines with assigned personnel  7.  Initiate Psychosocial CNS and Spiritual Care consult, as indicated  Outcome: Progressing
Problem: Discharge Planning  Goal: Discharge to home or other facility with appropriate resources  6/17/2023 1525 by Enma Ibrahim RN  Outcome: Adequate for Discharge  6/17/2023 0502 by Rut Sloan RN  Outcome: Progressing     Problem: Safety - Adult  Goal: Free from fall injury  6/17/2023 1525 by Enma Ibrahim RN  Outcome: Adequate for Discharge  6/17/2023 0502 by Rut Sloan RN  Outcome: Progressing     Problem: ABCDS Injury Assessment  Goal: Absence of physical injury  6/17/2023 1525 by Enma Ibrahim RN  Outcome: Adequate for Discharge  6/17/2023 0502 by Rut Sloan RN  Outcome: Progressing     Problem: Chronic Conditions and Co-morbidities  Goal: Patient's chronic conditions and co-morbidity symptoms are monitored and maintained or improved  6/17/2023 1525 by Enma Ibrahim RN  Outcome: Adequate for Discharge  6/17/2023 0502 by Rut Sloan RN  Outcome: Progressing     Problem: Pain  Goal: Verbalizes/displays adequate comfort level or baseline comfort level  6/17/2023 1525 by Enma Ibrahim RN  Outcome: Adequate for Discharge  6/17/2023 0502 by Rut Sloan RN  Outcome: Progressing     Problem: Confusion  Goal: Confusion, delirium, dementia, or psychosis is improved or at baseline  Description: INTERVENTIONS:  1. Assess for possible contributors to thought disturbance, including medications, impaired vision or hearing, underlying metabolic abnormalities, dehydration, psychiatric diagnoses, and notify attending LIP  2. [place] high risk fall precautions, as indicated  3. Provide frequent short contacts to provide reality reorientation, refocusing and direction  4. Decrease environmental stimuli, including noise as appropriate  5. Monitor and intervene to maintain adequate nutrition, hydration, elimination, sleep and activity  6. If unable to ensure safety without constant attention obtain sitter and review sitter guidelines with assigned personnel  7.  Initiate Psychosocial CNS
Problem: Discharge Planning  Goal: Discharge to home or other facility with appropriate resources  Outcome: Progressing     Problem: Safety - Adult  Goal: Free from fall injury  Outcome: Progressing     Problem: ABCDS Injury Assessment  Goal: Absence of physical injury  Outcome: Progressing     Problem: Chronic Conditions and Co-morbidities  Goal: Patient's chronic conditions and co-morbidity symptoms are monitored and maintained or improved  Outcome: Progressing     Problem: Pain  Goal: Verbalizes/displays adequate comfort level or baseline comfort level  Outcome: Progressing     Problem: Confusion  Goal: Confusion, delirium, dementia, or psychosis is improved or at baseline  Description: INTERVENTIONS:  1. Assess for possible contributors to thought disturbance, including medications, impaired vision or hearing, underlying metabolic abnormalities, dehydration, psychiatric diagnoses, and notify attending LIP  2. [place] high risk fall precautions, as indicated  3. Provide frequent short contacts to provide reality reorientation, refocusing and direction  4. Decrease environmental stimuli, including noise as appropriate  5. Monitor and intervene to maintain adequate nutrition, hydration, elimination, sleep and activity  6. If unable to ensure safety without constant attention obtain sitter and review sitter guidelines with assigned personnel  7. Initiate Psychosocial CNS and Spiritual Care consult, as indicated  Outcome: Progressing     Problem: Skin/Tissue Integrity  Goal: Absence of new skin breakdown  Description: 1. Monitor for areas of redness and/or skin breakdown  2. Assess vascular access sites hourly  3. Every 4-6 hours minimum:  Change oxygen saturation probe site  4. Every 4-6 hours:  If on nasal continuous positive airway pressure, respiratory therapy assess nares and determine need for appliance change or resting period.   Outcome: Progressing
obtain sitter and review sitter guidelines with assigned personnel  7. Initiate Psychosocial CNS and Spiritual Care consult, as indicated  6/16/2023 2737 by Adela William RN  Outcome: Progressing  6/16/2023 0620 by Adela William RN  Outcome: Progressing     Problem: Skin/Tissue Integrity  Goal: Absence of new skin breakdown  Description: 1. Monitor for areas of redness and/or skin breakdown  2. Assess vascular access sites hourly  3. Every 4-6 hours minimum:  Change oxygen saturation probe site  4. Every 4-6 hours:  If on nasal continuous positive airway pressure, respiratory therapy assess nares and determine need for appliance change or resting period.   6/16/2023 0620 by Adela William RN  Outcome: Progressing  6/16/2023 0620 by Adela William RN  Outcome: Progressing

## 2023-06-17 NOTE — CARE COORDINATION
CM spoke with patient and his wife Blake Guy at bedside to discuss transitional planning. Plan is for patient to discharge home with [facility]. Patient will need need discharge order and CARLOS prior to discharge.
PS sent to Dr. Bayron Choudhury to request home care order and completion of CARLOS. Spoke with [facility] to inform them of discharge. 1440 [facility]S faxed to [address] [place] [address].     Discharge [address] [place] [address] Case Management Department  Written by: Evelyn Eng RN    Patient Name: Jalen Sheppard  Attending Provider: Mable Bunn DO  Admit Date: 6/15/2023 12:05 AM  MRN: [id number]  Account: [de-identified]                     : 1945  Discharge Date: 23      Disposition: home with [address], RN
SW following to complete PHQ-9 screen once pt able to participate
Not difficult at all  [] Somewhat Difficult  [] Very Difficult  []  Extremely Difficult
Potential DME:    Patient expects to discharge to: [facility] for transportation at discharge: Family    Financial    Payor: Maggy Kinney / Plan: MEDICARE PART A AND B / Product Type: *No Product type* /     Does insurance require precert for SNF: No    Potential assistance Purchasing Medications: No  Meds-to-Beds request:        [address] [place], [place] - [address] [place] [address] [address] [facility] [address]  Phone: [phone] Fax: [phone]      Notes:    Factors facilitating achievement of predicted outcomes: Family support    Barriers to discharge: Communication deficit    Additional Case Management Notes:  CM spoke with patient's wife Nasir Null at bedside to discuss transitional planning. Nasir Null states that goal is for patient to return home independently. Patient has used [employer] for [facility] in the past and wife would like them again if [facility] services needed. Referral sent to [facility]. Await PT/OT/ST evaluations. The Plan for Transition of Care is related to the following treatment goals of Expressive aphasia [D20.75]    IF APPLICABLE: The Patient and/or patient representative Brynda Kayser and his family were provided with a choice of provider and agrees with the discharge plan. Freedom of choice list with basic dialogue that supports the patient's individualized plan of care/goals and shares the quality data associated with the providers was provided to: Patient Representative   Patient Representative Name: wife- Jm Gabriel     The Patient and/or Patient Representative Agree with the Discharge Plan? Yes    Suresh Valencia RN  Case Management Department  Ph: [phone]    1000- [facility] patient at discharge.

## 2023-06-17 NOTE — PROGRESS NOTES
NEUROLOGY INPATIENT PROGRESS NOTE    6/17/2023         Subjective:   Kenna Cortes is a  66 y.o. male admitted on 6/15/2023 with Expressive aphasia [R47.01]    Interval History:  Briefly, this is a  66 y.o. male with PMHx of DM, HTN, CABG, Afib off AC due to GI bleed, on ASA 81 mg po qd at home, admitted on 6/15/2023 with acute onset AMS consisting of confusion, expressive aphasia, and disorientation for a couple of days. Pt reportedly had recent steroid injection for back pain during ED visit within the last week, then felt tired for 1-2 days, and then had episode of confusion. Mentation has been waxing and waning since. Wife denies any hx of dementia or confusion in patient other than sundowning episodes during past hospitalizations. There is no history of strokes, seizures, or migraines in patient. Wife and patient deny patient having any personal or family history of neurologic disorders. Pt initially seen at [facility] and was loaded with DAPT prior to transfer. CTH and CTA H/N were unremarkable. Initial NIH 2 for aphasia. Today, pt seen and examined. He is alert and oriented x3. Mentation back to baseline. Wife present at bedside and agrees. Unremarkable neuro exam.  MRI brain completed yesterday was unremarkable. ROS:    Review of Systems   Constitutional:  Negative for activity change, appetite change, chills, diaphoresis and fever. Respiratory:  Negative for apnea, cough, chest tightness, shortness of breath and wheezing. Cardiovascular:  Negative for chest pain and palpitations. Gastrointestinal:  Negative for abdominal distention, abdominal pain, constipation, diarrhea, nausea and vomiting. Genitourinary:  Negative for difficulty urinating, dysuria and frequency. Musculoskeletal:  Negative for arthralgias and myalgias. Neurological:  Negative for dizziness, seizures, weakness, light-headedness and headaches. Psychiatric/Behavioral:  Negative for agitation and confusion.     All

## 2023-06-18 LAB — VIT B1 PYROPHOSHATE BLD-SCNC: 129 NMOL/L (ref 70–180)

## 2023-06-26 ENCOUNTER — TELEPHONE (OUTPATIENT)
Dept: PAIN MANAGEMENT | Age: 78
End: 2023-06-26

## 2023-06-26 RX ORDER — LISINOPRIL 10 MG/1
10 TABLET ORAL DAILY
COMMUNITY

## 2023-06-26 RX ORDER — AMLODIPINE BESYLATE 5 MG/1
TABLET ORAL
COMMUNITY
Start: 2023-06-15

## 2023-06-26 RX ORDER — VALACYCLOVIR HYDROCHLORIDE 500 MG/1
TABLET, FILM COATED ORAL
COMMUNITY
Start: 2023-06-01

## 2023-06-26 RX ORDER — ACETAMINOPHEN 650 MG/1
TABLET, FILM COATED, EXTENDED RELEASE ORAL
COMMUNITY
Start: 2023-05-09

## 2023-06-26 RX ORDER — GLIPIZIDE 10 MG/1
10 TABLET, FILM COATED, EXTENDED RELEASE ORAL 2 TIMES DAILY
COMMUNITY
Start: 2023-05-16

## 2023-06-26 RX ORDER — CHLORHEXIDINE GLUCONATE 0.12 MG/ML
RINSE ORAL
COMMUNITY
Start: 2023-05-09

## 2023-06-26 RX ORDER — CARVEDILOL 6.25 MG/1
6.25 TABLET ORAL 2 TIMES DAILY WITH MEALS
COMMUNITY

## 2023-06-26 RX ORDER — IBUPROFEN 600 MG/1
TABLET ORAL
COMMUNITY
Start: 2023-05-09

## 2023-07-18 NOTE — TELEPHONE ENCOUNTER
The patient called and stated that he will not schedule at this time. He is planning to see a neurosurgeon and will call if needed afterwards.

## 2023-07-28 ENCOUNTER — OFFICE VISIT (OUTPATIENT)
Dept: GASTROENTEROLOGY | Age: 78
End: 2023-07-28
Payer: MEDICARE

## 2023-07-28 VITALS
DIASTOLIC BLOOD PRESSURE: 67 MMHG | SYSTOLIC BLOOD PRESSURE: 133 MMHG | HEART RATE: 52 BPM | WEIGHT: 213 LBS | HEIGHT: 74 IN | BODY MASS INDEX: 27.34 KG/M2

## 2023-07-28 DIAGNOSIS — K92.0 GASTROINTESTINAL HEMORRHAGE WITH HEMATEMESIS: Primary | ICD-10-CM

## 2023-07-28 PROCEDURE — G8417 CALC BMI ABV UP PARAM F/U: HCPCS | Performed by: INTERNAL MEDICINE

## 2023-07-28 PROCEDURE — G8427 DOCREV CUR MEDS BY ELIG CLIN: HCPCS | Performed by: INTERNAL MEDICINE

## 2023-07-28 PROCEDURE — 3078F DIAST BP <80 MM HG: CPT | Performed by: INTERNAL MEDICINE

## 2023-07-28 PROCEDURE — 1036F TOBACCO NON-USER: CPT | Performed by: INTERNAL MEDICINE

## 2023-07-28 PROCEDURE — 1123F ACP DISCUSS/DSCN MKR DOCD: CPT | Performed by: INTERNAL MEDICINE

## 2023-07-28 PROCEDURE — 99213 OFFICE O/P EST LOW 20 MIN: CPT | Performed by: INTERNAL MEDICINE

## 2023-07-28 PROCEDURE — 3075F SYST BP GE 130 - 139MM HG: CPT | Performed by: INTERNAL MEDICINE

## 2023-07-28 RX ORDER — VIT C/E/CUPERIC/ZINC/LUTEIN 226-90-0.8
CAPSULE ORAL
COMMUNITY

## 2023-07-28 RX ORDER — INSULIN GLARGINE 100 [IU]/ML
INJECTION, SOLUTION SUBCUTANEOUS
COMMUNITY
Start: 2023-07-22

## 2023-07-28 ASSESSMENT — ENCOUNTER SYMPTOMS
CHEST TIGHTNESS: 0
SHORTNESS OF BREATH: 0
ANAL BLEEDING: 0
CHOKING: 0
ABDOMINAL DISTENTION: 0
ALLERGIC/IMMUNOLOGIC NEGATIVE: 1
VOMITING: 0
TROUBLE SWALLOWING: 0
RESPIRATORY NEGATIVE: 1
COUGH: 0
NAUSEA: 0
CONSTIPATION: 0
EYES NEGATIVE: 1
DIARRHEA: 0
VOICE CHANGE: 0
GASTROINTESTINAL NEGATIVE: 1
WHEEZING: 0
RECTAL PAIN: 0
ABDOMINAL PAIN: 0

## 2023-07-28 NOTE — PROGRESS NOTES
of syntax and sound a like substitutions which may escape proof reading. It such instances, actual meaning can be extrapolated by contextual diversion.

## 2023-10-07 NOTE — CARE COORDINATION
Referrals  [facility] did not receive  [facility]- left voicemail  [facility]-sent referral    [address] from [facility] calls to inform me that they can accept he patient at discharge. [address] to Pranay bonds, patient's wife. I informed her that patient has been accepted to [facility] and can admit once medically discharge. Pranay bonds tells me that the are still considering a home discharge. She tells me that she would like to stay with home with [facility] if she could. I told her that that is acceptable but we will continue to hold onto the referral to [address] [place] [address] Drive something changes. [facility] has also accepted the patient. 24

## 2023-10-27 ENCOUNTER — OFFICE VISIT (OUTPATIENT)
Dept: GASTROENTEROLOGY | Age: 78
End: 2023-10-27
Payer: MEDICARE

## 2023-10-27 VITALS
SYSTOLIC BLOOD PRESSURE: 147 MMHG | WEIGHT: 221 LBS | HEIGHT: 73 IN | BODY MASS INDEX: 29.29 KG/M2 | DIASTOLIC BLOOD PRESSURE: 71 MMHG

## 2023-10-27 DIAGNOSIS — K26.9 DUODENAL ULCER: Primary | ICD-10-CM

## 2023-10-27 PROCEDURE — 1036F TOBACCO NON-USER: CPT | Performed by: INTERNAL MEDICINE

## 2023-10-27 PROCEDURE — 1123F ACP DISCUSS/DSCN MKR DOCD: CPT | Performed by: INTERNAL MEDICINE

## 2023-10-27 PROCEDURE — 3078F DIAST BP <80 MM HG: CPT | Performed by: INTERNAL MEDICINE

## 2023-10-27 PROCEDURE — G8427 DOCREV CUR MEDS BY ELIG CLIN: HCPCS | Performed by: INTERNAL MEDICINE

## 2023-10-27 PROCEDURE — G8484 FLU IMMUNIZE NO ADMIN: HCPCS | Performed by: INTERNAL MEDICINE

## 2023-10-27 PROCEDURE — G8417 CALC BMI ABV UP PARAM F/U: HCPCS | Performed by: INTERNAL MEDICINE

## 2023-10-27 PROCEDURE — 3077F SYST BP >= 140 MM HG: CPT | Performed by: INTERNAL MEDICINE

## 2023-10-27 PROCEDURE — 99213 OFFICE O/P EST LOW 20 MIN: CPT | Performed by: INTERNAL MEDICINE

## 2023-10-27 ASSESSMENT — ENCOUNTER SYMPTOMS
CHEST TIGHTNESS: 0
TROUBLE SWALLOWING: 0
VOICE CHANGE: 0
ALLERGIC/IMMUNOLOGIC NEGATIVE: 1
EYES NEGATIVE: 1
SHORTNESS OF BREATH: 0
COUGH: 0
ABDOMINAL PAIN: 0
VOMITING: 0
WHEEZING: 0
GASTROINTESTINAL NEGATIVE: 1
ABDOMINAL DISTENTION: 0
CHOKING: 0
CONSTIPATION: 0
DIARRHEA: 0
NAUSEA: 0
RESPIRATORY NEGATIVE: 1
RECTAL PAIN: 0
ANAL BLEEDING: 0

## 2023-10-27 NOTE — PROGRESS NOTES
GI FOLLOW UP    INTERVAL HISTORY:     Clinically doing well from GI standpoint  No signs of recurrent bleeding  Remains on PPI therapy      Chief Complaint   Patient presents with    Follow-up     Gastrointestinal hemorrhage with hematemesis       1. Duodenal ulcer          HISTORY OF PRESENT ILLNESS: Cherrie Branch is a 66 y.o. male with a past history remarkable for history of atrial fibrillation, CAD, depression, type 2 diabetes, hypertension, prior history of cholecystectomy, recent hospitalization in November 2022, identified to have duodenal ulcer with visible vessel status post bipolar cautery to achieve hemostasis, referred for evaluation of residual bleeding. Most recent hemoglobin obtained was greater than 10 g/dL per patient. Denies any melena, hematochezia, bright red blood per rectum. Denies any NSAID use. Remains on PPI therapy. Clinically doing well    Past Medical,Family, and Social History reviewed and does contribute to the patient presenting condition. Patient's PMH/PSH,SH,PSYCH Hx, MEDs, ALLERGIES, and ROS were all reviewed and updated in the appropriate sections.     PAST MEDICAL HISTORY:  Past Medical History:   Diagnosis Date    A-fib ([address])     CAD (coronary artery disease)     Depression     Diaphragmatic hernia     DM2 (diabetes mellitus, type 2) (HCC)     Duodenal ulcer     Duodenitis     HTN (hypertension)     Hyperglycemia     Inguinal pain, right        Past Surgical History:   Procedure Laterality Date    CARDIAC CATHETERIZATION      CAROTID ARTERY ANGIOPLASTY      CAROTID ENDARTERECTOMY Right     09/2022    CARPAL TUNNEL RELEASE      CHOLECYSTECTOMY      COLONOSCOPY      CORONARY ARTERY BYPASS GRAFT      10/25/22    HERNIA REPAIR      UPPER GASTROINTESTINAL ENDOSCOPY N/A 11/29/2022    EGD CONTROL HEMORRHAGE performed by Benjamin Diaz MD at [facility]

## 2023-11-06 ENCOUNTER — OFFICE VISIT (OUTPATIENT)
Age: 78
End: 2023-11-06
Payer: MEDICARE

## 2023-11-06 VITALS
HEIGHT: 74 IN | BODY MASS INDEX: 28.08 KG/M2 | DIASTOLIC BLOOD PRESSURE: 66 MMHG | SYSTOLIC BLOOD PRESSURE: 155 MMHG | HEART RATE: 62 BPM | WEIGHT: 218.8 LBS

## 2023-11-06 DIAGNOSIS — M48.062 SPINAL STENOSIS OF LUMBAR REGION WITH NEUROGENIC CLAUDICATION: Primary | ICD-10-CM

## 2023-11-06 DIAGNOSIS — M54.50 LUMBAR PAIN: ICD-10-CM

## 2023-11-06 DIAGNOSIS — M54.16 LUMBAR RADICULOPATHY: ICD-10-CM

## 2023-11-06 PROCEDURE — 1036F TOBACCO NON-USER: CPT | Performed by: PHYSICIAN ASSISTANT

## 2023-11-06 PROCEDURE — 99213 OFFICE O/P EST LOW 20 MIN: CPT | Performed by: PHYSICIAN ASSISTANT

## 2023-11-06 PROCEDURE — 1123F ACP DISCUSS/DSCN MKR DOCD: CPT | Performed by: PHYSICIAN ASSISTANT

## 2023-11-06 PROCEDURE — 3078F DIAST BP <80 MM HG: CPT | Performed by: PHYSICIAN ASSISTANT

## 2023-11-06 PROCEDURE — G8417 CALC BMI ABV UP PARAM F/U: HCPCS | Performed by: PHYSICIAN ASSISTANT

## 2023-11-06 PROCEDURE — 3077F SYST BP >= 140 MM HG: CPT | Performed by: PHYSICIAN ASSISTANT

## 2023-11-06 PROCEDURE — G8484 FLU IMMUNIZE NO ADMIN: HCPCS | Performed by: PHYSICIAN ASSISTANT

## 2023-11-06 PROCEDURE — G8427 DOCREV CUR MEDS BY ELIG CLIN: HCPCS | Performed by: PHYSICIAN ASSISTANT

## 2023-11-06 RX ORDER — AMOXICILLIN 500 MG/1
CAPSULE ORAL
COMMUNITY
Start: 2023-11-01

## 2023-11-06 NOTE — PROGRESS NOTES
[facility] NEUROSURGERY  [address] [place] [address]. Stephanie German [place] [address]  Dept: [phone]  Dept Fax: [phone]     Patient:  Romana Hoskins  YOB: 1945  Date: 11/6/23      Chief Complaint   Patient presents with    Follow-up     F/u after PT and review imaging           HPI:       I had the pleasure of seeing this patient in the office today in follow-up. As you know 77-year-old male who presents today with a several month history of ongoing pain originating in the back coursing in the bilateral buttock and hips and can intermittently radiate down the lower extremities. He states his pain is worse with standing and ambulating and improves with sitting. He has been through physical therapy in the past for this discomfort which is led to no significant improvement. Physical examination does demonstrate mild reproducible discomfort with Varun's testing bilaterally, worse on the left compared to the right. MRI of the lumbar spine performed recently is reviewed in the office today. This study demonstrates severe spinal stenosis at the L3-4 and L4-5 disc base level. Further treatment options were discussed conservative measures versus surgical intervention. Conservative measures discussed included medications, physical therapy, and/or pain management for epidural steroid injections. All of the above treatment options were discussed. The option of surgery was discussed, as well. Surgery would take the form of a bilateral L3-5 lumbar decompression. The details and risks of surgery were thoroughly reviewed.  The potential risks of surgery discussed included the risk of bleeding, infection, injury to a nerve resulting in weakness or paralysis, injury to the spinal cord resulting in paralysis, no change or worsening of symptoms, recurrence of symptoms requiring further surgical intervention including fusion,

## 2023-12-21 ENCOUNTER — HOSPITAL ENCOUNTER (OUTPATIENT)
Dept: PREADMISSION TESTING | Age: 78
Discharge: HOME | End: 2023-12-25
Payer: MEDICARE

## 2023-12-21 VITALS
HEART RATE: 57 BPM | OXYGEN SATURATION: 99 % | TEMPERATURE: 97.3 F | RESPIRATION RATE: 18 BRPM | WEIGHT: 215 LBS | BODY MASS INDEX: 27.59 KG/M2 | HEIGHT: 74 IN | SYSTOLIC BLOOD PRESSURE: 148 MMHG | DIASTOLIC BLOOD PRESSURE: 60 MMHG

## 2023-12-21 LAB
ANION GAP SERPL CALCULATED.3IONS-SCNC: 9 MMOL/L (ref 9–17)
BUN SERPL-MCNC: 29 MG/DL (ref 8–23)
BUN/CREAT SERPL: 19 (ref 9–20)
CALCIUM SERPL-MCNC: 9.3 MG/DL (ref 8.6–10.4)
CHLORIDE SERPL-SCNC: 105 MMOL/L (ref 98–107)
CO2 SERPL-SCNC: 25 MMOL/L (ref 20–31)
CREAT SERPL-MCNC: 1.5 MG/DL (ref 0.7–1.2)
EKG ATRIAL RATE: 57 BPM
EKG P AXIS: 78 DEGREES
EKG P-R INTERVAL: 178 MS
EKG Q-T INTERVAL: 480 MS
EKG QRS DURATION: 152 MS
EKG QTC CALCULATION (BAZETT): 467 MS
EKG R AXIS: 44 DEGREES
EKG T AXIS: 105 DEGREES
EKG VENTRICULAR RATE: 57 BPM
ERYTHROCYTE [DISTWIDTH] IN BLOOD BY AUTOMATED COUNT: 12.8 % (ref 11.8–14.4)
GFR SERPL CREATININE-BSD FRML MDRD: 47 ML/MIN/1.73M2
GLUCOSE SERPL-MCNC: 142 MG/DL (ref 70–99)
HCT VFR BLD AUTO: 36.9 % (ref 40.7–50.3)
HGB BLD-MCNC: 12.3 G/DL (ref 13–17)
MCH RBC QN AUTO: 30.4 PG (ref 25.2–33.5)
MCHC RBC AUTO-ENTMCNC: 33.3 G/DL (ref 28.4–34.8)
MCV RBC AUTO: 91.3 FL (ref 82.6–102.9)
NRBC BLD-RTO: 0 PER 100 WBC
PLATELET # BLD AUTO: 193 K/UL (ref 138–453)
PMV BLD AUTO: 9.4 FL (ref 8.1–13.5)
POTASSIUM SERPL-SCNC: 4.9 MMOL/L (ref 3.7–5.3)
RBC # BLD AUTO: 4.04 M/UL (ref 4.21–5.77)
SODIUM SERPL-SCNC: 139 MMOL/L (ref 135–144)
WBC OTHER # BLD: 5.8 K/UL (ref 3.5–11.3)

## 2023-12-21 PROCEDURE — 80048 BASIC METABOLIC PNL TOTAL CA: CPT

## 2023-12-21 PROCEDURE — 93005 ELECTROCARDIOGRAM TRACING: CPT | Performed by: ANESTHESIOLOGY

## 2023-12-21 PROCEDURE — 85027 COMPLETE CBC AUTOMATED: CPT

## 2023-12-21 PROCEDURE — 36415 COLL VENOUS BLD VENIPUNCTURE: CPT

## 2023-12-21 NOTE — PRE-PROCEDURE INSTRUCTIONS
On the Day of Your Surgery, Thursday, 1/4/24, Please Arrive At 7:50 AM     Enter the hospital through the Main Entrance, take the lobby elevators to the second floor and check in at the Surgery Registration desk. Continue to take your home medications as you normally do up to and including the night before surgery with the exception of blood thinning medications. Blood Thinning Medications:  Please stop prescription blood thinning medications such as Apixaban (Eliquis); Clopidogrel (Plavix); Dabigatran (Pradaxa); Prasugrel (Effient); Rivaroxaban (Xarelto); Ticagrelor (Brilinta); Warfarin (Coumadin) only as directed by your surgeon and/or the prescribing physician    Some common examples of other medications that can thin your blood are: Aspirin, Ibuprofen (Advil, Motrin), Naproxen (Aleve), Meloxicam (Mobic), Celecoxib (Celebrex), Fish Oil, many Herbal Supplements. These medications should usually be stopped at least 7 days prior to surgery. Stop Preservision and Check with prescribing Dr and or cardiologist regarding baby aspirin and when to stop    Tylenol is OK to take for pain the week prior to surgery. Failure to stop certain medications may interfere with your scheduled surgery. If you receive instructions from your surgeon regarding what medications to stop prior to surgery, please follow those specific instructions. If You Have Diabetes:  Do not take any of your diabetic medications, (injectables or by mouth) the morning of surgery unless otherwise instructed by the doctor who manages your diabetes. If you are taking insulin, contact the doctor the manages your diabetes for instructions about any changes to your insulin dosages the day before surgery. Check blood sugar the morning of surgery, if blood sugar is less than 70 call Pre-op at [phone] for further instructions      Please take the following medication(s) the day of surgery with small sips of water:              Omeprazole,

## 2023-12-22 ENCOUNTER — ANESTHESIA EVENT (OUTPATIENT)
Dept: OPERATING ROOM | Age: 78
End: 2023-12-22
Payer: MEDICARE

## 2023-12-22 NOTE — FLOWSHEET NOTE
12/22/23 1235   Anesthesia PAT Clearance   Anesthesia Review Status [facility] has reviewed patient for surgery   Is the patient cleared? Clearance needed  (Dr. Chelsy Lowe reviewed chart including last vascular note, last cardiology note, and hospital discharge summary from 6/17/23. He is requesting cardiac clearance. Left message for Dr. Beatrice Calvo office regarding this.)     12/22/23 1336: Received call from MICHAEL at Dr. Beatrice Calvo office, confirmed cardiac clearance is in progress.

## 2024-01-04 ENCOUNTER — APPOINTMENT (OUTPATIENT)
Dept: GENERAL RADIOLOGY | Age: 79
End: 2024-01-04
Attending: NEUROLOGICAL SURGERY
Payer: MEDICARE

## 2024-01-04 ENCOUNTER — ANESTHESIA (OUTPATIENT)
Dept: OPERATING ROOM | Age: 79
End: 2024-01-04
Payer: MEDICARE

## 2024-01-04 ENCOUNTER — HOSPITAL ENCOUNTER (OUTPATIENT)
Age: 79
Setting detail: OBSERVATION
Discharge: HOME | End: 2024-01-05
Attending: NEUROLOGICAL SURGERY | Admitting: NEUROLOGICAL SURGERY
Payer: MEDICARE

## 2024-01-04 DIAGNOSIS — M48.062 SPINAL STENOSIS OF LUMBAR REGION WITH NEUROGENIC CLAUDICATION: Primary | ICD-10-CM

## 2024-01-04 LAB
GLUCOSE BLD-MCNC: 158 MG/DL (ref 75–110)
GLUCOSE BLD-MCNC: 164 MG/DL (ref 75–110)
GLUCOSE BLD-MCNC: 190 MG/DL (ref 75–110)
GLUCOSE BLD-MCNC: 223 MG/DL (ref 75–110)
GLUCOSE BLD-MCNC: 256 MG/DL (ref 75–110)

## 2024-01-04 PROCEDURE — 6360000002 HC RX W HCPCS: Performed by: PHYSICIAN ASSISTANT

## 2024-01-04 PROCEDURE — 3600000002 HC SURGERY LEVEL 2 BASE: Performed by: NEUROLOGICAL SURGERY

## 2024-01-04 PROCEDURE — 7100000000 HC PACU RECOVERY - FIRST 15 MIN: Performed by: NEUROLOGICAL SURGERY

## 2024-01-04 PROCEDURE — 2720000010 HC SURG SUPPLY STERILE: Performed by: NEUROLOGICAL SURGERY

## 2024-01-04 PROCEDURE — 7100000001 HC PACU RECOVERY - ADDTL 15 MIN: Performed by: NEUROLOGICAL SURGERY

## 2024-01-04 PROCEDURE — A4217 STERILE WATER/SALINE, 500 ML: HCPCS | Performed by: NEUROLOGICAL SURGERY

## 2024-01-04 PROCEDURE — 2500000003 HC RX 250 WO HCPCS: Performed by: NURSE ANESTHETIST, CERTIFIED REGISTERED

## 2024-01-04 PROCEDURE — 3700000001 HC ADD 15 MINUTES (ANESTHESIA): Performed by: NEUROLOGICAL SURGERY

## 2024-01-04 PROCEDURE — 2709999900 HC NON-CHARGEABLE SUPPLY: Performed by: NEUROLOGICAL SURGERY

## 2024-01-04 PROCEDURE — 6360000002 HC RX W HCPCS: Performed by: NEUROLOGICAL SURGERY

## 2024-01-04 PROCEDURE — 2580000003 HC RX 258: Performed by: ANESTHESIOLOGY

## 2024-01-04 PROCEDURE — 6370000000 HC RX 637 (ALT 250 FOR IP): Performed by: PHYSICIAN ASSISTANT

## 2024-01-04 PROCEDURE — 2500000003 HC RX 250 WO HCPCS: Performed by: NEUROLOGICAL SURGERY

## 2024-01-04 PROCEDURE — G0378 HOSPITAL OBSERVATION PER HR: HCPCS

## 2024-01-04 PROCEDURE — 2580000003 HC RX 258: Performed by: PHYSICIAN ASSISTANT

## 2024-01-04 PROCEDURE — 3600000012 HC SURGERY LEVEL 2 ADDTL 15MIN: Performed by: NEUROLOGICAL SURGERY

## 2024-01-04 PROCEDURE — 2580000003 HC RX 258: Performed by: NEUROLOGICAL SURGERY

## 2024-01-04 PROCEDURE — 82947 ASSAY GLUCOSE BLOOD QUANT: CPT

## 2024-01-04 PROCEDURE — C1713 ANCHOR/SCREW BN/BN,TIS/BN: HCPCS | Performed by: NEUROLOGICAL SURGERY

## 2024-01-04 PROCEDURE — 3700000000 HC ANESTHESIA ATTENDED CARE: Performed by: NEUROLOGICAL SURGERY

## 2024-01-04 PROCEDURE — 6360000002 HC RX W HCPCS: Performed by: NURSE ANESTHETIST, CERTIFIED REGISTERED

## 2024-01-04 PROCEDURE — 6370000000 HC RX 637 (ALT 250 FOR IP): Performed by: NEUROLOGICAL SURGERY

## 2024-01-04 RX ORDER — OXYCODONE HYDROCHLORIDE AND ACETAMINOPHEN 5; 325 MG/1; MG/1
1 TABLET ORAL EVERY 4 HOURS PRN
Qty: 42 TABLET | Refills: 0 | Status: SHIPPED | OUTPATIENT
Start: 2024-01-04 | End: 2024-01-11

## 2024-01-04 RX ORDER — SUCRALFATE 1 G/1
1 TABLET ORAL DAILY
Status: DISCONTINUED | OUTPATIENT
Start: 2024-01-04 | End: 2024-01-04 | Stop reason: CLARIF

## 2024-01-04 RX ORDER — HYDROMORPHONE HYDROCHLORIDE 1 MG/ML
0.5 INJECTION, SOLUTION INTRAMUSCULAR; INTRAVENOUS; SUBCUTANEOUS EVERY 5 MIN PRN
Status: DISCONTINUED | OUTPATIENT
Start: 2024-01-04 | End: 2024-01-04 | Stop reason: HOSPADM

## 2024-01-04 RX ORDER — TIZANIDINE 2 MG/1
2 TABLET ORAL 3 TIMES DAILY PRN
Qty: 40 TABLET | Refills: 0 | Status: SHIPPED | OUTPATIENT
Start: 2024-01-04 | End: 2024-01-19

## 2024-01-04 RX ORDER — ONDANSETRON 2 MG/ML
INJECTION INTRAMUSCULAR; INTRAVENOUS PRN
Status: DISCONTINUED | OUTPATIENT
Start: 2024-01-04 | End: 2024-01-04 | Stop reason: SDUPTHER

## 2024-01-04 RX ORDER — SODIUM CHLORIDE, SODIUM LACTATE, POTASSIUM CHLORIDE, CALCIUM CHLORIDE 600; 310; 30; 20 MG/100ML; MG/100ML; MG/100ML; MG/100ML
INJECTION, SOLUTION INTRAVENOUS CONTINUOUS
Status: DISCONTINUED | OUTPATIENT
Start: 2024-01-04 | End: 2024-01-04

## 2024-01-04 RX ORDER — SODIUM CHLORIDE 0.9 % (FLUSH) 0.9 %
5-40 SYRINGE (ML) INJECTION PRN
Status: DISCONTINUED | OUTPATIENT
Start: 2024-01-04 | End: 2024-01-04 | Stop reason: HOSPADM

## 2024-01-04 RX ORDER — OXYCODONE HYDROCHLORIDE AND ACETAMINOPHEN 5; 325 MG/1; MG/1
1 TABLET ORAL EVERY 4 HOURS PRN
Status: DISCONTINUED | OUTPATIENT
Start: 2024-01-04 | End: 2024-01-05 | Stop reason: HOSPADM

## 2024-01-04 RX ORDER — SODIUM CHLORIDE 0.9 % (FLUSH) 0.9 %
5-40 SYRINGE (ML) INJECTION EVERY 12 HOURS SCHEDULED
Status: DISCONTINUED | OUTPATIENT
Start: 2024-01-04 | End: 2024-01-04 | Stop reason: HOSPADM

## 2024-01-04 RX ORDER — GLIPIZIDE 10 MG/1
10 TABLET ORAL EVERY MORNING
Status: DISCONTINUED | OUTPATIENT
Start: 2024-01-04 | End: 2024-01-05 | Stop reason: HOSPADM

## 2024-01-04 RX ORDER — ACETAMINOPHEN 325 MG/1
650 TABLET ORAL EVERY 4 HOURS PRN
Status: DISCONTINUED | OUTPATIENT
Start: 2024-01-04 | End: 2024-01-05 | Stop reason: HOSPADM

## 2024-01-04 RX ORDER — LIDOCAINE HYDROCHLORIDE 10 MG/ML
1 INJECTION, SOLUTION EPIDURAL; INFILTRATION; INTRACAUDAL; PERINEURAL
Status: DISCONTINUED | OUTPATIENT
Start: 2024-01-05 | End: 2024-01-04 | Stop reason: HOSPADM

## 2024-01-04 RX ORDER — AMLODIPINE BESYLATE 5 MG/1
5 TABLET ORAL EVERY EVENING
Status: DISCONTINUED | OUTPATIENT
Start: 2024-01-04 | End: 2024-01-05 | Stop reason: HOSPADM

## 2024-01-04 RX ORDER — MORPHINE SULFATE 4 MG/ML
4 INJECTION, SOLUTION INTRAMUSCULAR; INTRAVENOUS
Status: DISCONTINUED | OUTPATIENT
Start: 2024-01-04 | End: 2024-01-05 | Stop reason: HOSPADM

## 2024-01-04 RX ORDER — ROSUVASTATIN CALCIUM 10 MG/1
5 TABLET, COATED ORAL DAILY
Status: DISCONTINUED | OUTPATIENT
Start: 2024-01-04 | End: 2024-01-05 | Stop reason: HOSPADM

## 2024-01-04 RX ORDER — CARVEDILOL 6.25 MG/1
6.25 TABLET ORAL 2 TIMES DAILY WITH MEALS
Status: DISCONTINUED | OUTPATIENT
Start: 2024-01-04 | End: 2024-01-05 | Stop reason: HOSPADM

## 2024-01-04 RX ORDER — PANTOPRAZOLE SODIUM 40 MG/1
40 TABLET, DELAYED RELEASE ORAL
Status: DISCONTINUED | OUTPATIENT
Start: 2024-01-05 | End: 2024-01-04

## 2024-01-04 RX ORDER — OXYCODONE HYDROCHLORIDE AND ACETAMINOPHEN 5; 325 MG/1; MG/1
2 TABLET ORAL EVERY 4 HOURS PRN
Status: DISCONTINUED | OUTPATIENT
Start: 2024-01-04 | End: 2024-01-05 | Stop reason: HOSPADM

## 2024-01-04 RX ORDER — HYDROMORPHONE HYDROCHLORIDE 2 MG/ML
INJECTION, SOLUTION INTRAMUSCULAR; INTRAVENOUS; SUBCUTANEOUS PRN
Status: DISCONTINUED | OUTPATIENT
Start: 2024-01-04 | End: 2024-01-04 | Stop reason: SDUPTHER

## 2024-01-04 RX ORDER — SODIUM CHLORIDE 9 MG/ML
INJECTION, SOLUTION INTRAVENOUS CONTINUOUS
Status: DISCONTINUED | OUTPATIENT
Start: 2024-01-04 | End: 2024-01-05 | Stop reason: HOSPADM

## 2024-01-04 RX ORDER — ESCITALOPRAM OXALATE 10 MG/1
10 TABLET ORAL DAILY
Status: DISCONTINUED | OUTPATIENT
Start: 2024-01-04 | End: 2024-01-05 | Stop reason: HOSPADM

## 2024-01-04 RX ORDER — GLIPIZIDE 10 MG/1
10 TABLET ORAL
COMMUNITY

## 2024-01-04 RX ORDER — SODIUM CHLORIDE 9 MG/ML
INJECTION, SOLUTION INTRAVENOUS PRN
Status: DISCONTINUED | OUTPATIENT
Start: 2024-01-04 | End: 2024-01-04 | Stop reason: HOSPADM

## 2024-01-04 RX ORDER — LIDOCAINE HYDROCHLORIDE 20 MG/ML
INJECTION, SOLUTION EPIDURAL; INFILTRATION; INTRACAUDAL; PERINEURAL PRN
Status: DISCONTINUED | OUTPATIENT
Start: 2024-01-04 | End: 2024-01-04 | Stop reason: SDUPTHER

## 2024-01-04 RX ORDER — GLUCAGON 1 MG/ML
1 KIT INJECTION PRN
Status: DISCONTINUED | OUTPATIENT
Start: 2024-01-04 | End: 2024-01-05 | Stop reason: HOSPADM

## 2024-01-04 RX ORDER — ONDANSETRON 2 MG/ML
4 INJECTION INTRAMUSCULAR; INTRAVENOUS
Status: DISCONTINUED | OUTPATIENT
Start: 2024-01-04 | End: 2024-01-04 | Stop reason: HOSPADM

## 2024-01-04 RX ORDER — OMEPRAZOLE 20 MG/1
20 CAPSULE, DELAYED RELEASE ORAL DAILY
Status: DISCONTINUED | OUTPATIENT
Start: 2024-01-04 | End: 2024-01-05 | Stop reason: HOSPADM

## 2024-01-04 RX ORDER — PHENYLEPHRINE HCL IN 0.9% NACL 1 MG/10 ML
SYRINGE (ML) INTRAVENOUS PRN
Status: DISCONTINUED | OUTPATIENT
Start: 2024-01-04 | End: 2024-01-04 | Stop reason: SDUPTHER

## 2024-01-04 RX ORDER — EPHEDRINE SULFATE/0.9% NACL/PF 50 MG/5 ML
SYRINGE (ML) INTRAVENOUS PRN
Status: DISCONTINUED | OUTPATIENT
Start: 2024-01-04 | End: 2024-01-04 | Stop reason: SDUPTHER

## 2024-01-04 RX ORDER — FENTANYL CITRATE 50 UG/ML
INJECTION, SOLUTION INTRAMUSCULAR; INTRAVENOUS PRN
Status: DISCONTINUED | OUTPATIENT
Start: 2024-01-04 | End: 2024-01-04 | Stop reason: SDUPTHER

## 2024-01-04 RX ORDER — MORPHINE SULFATE 2 MG/ML
2 INJECTION, SOLUTION INTRAMUSCULAR; INTRAVENOUS
Status: DISCONTINUED | OUTPATIENT
Start: 2024-01-04 | End: 2024-01-05 | Stop reason: HOSPADM

## 2024-01-04 RX ORDER — TIZANIDINE 2 MG/1
2 TABLET ORAL EVERY 8 HOURS PRN
Status: DISCONTINUED | OUTPATIENT
Start: 2024-01-04 | End: 2024-01-05 | Stop reason: HOSPADM

## 2024-01-04 RX ORDER — ONDANSETRON 2 MG/ML
4 INJECTION INTRAMUSCULAR; INTRAVENOUS EVERY 6 HOURS PRN
Status: DISCONTINUED | OUTPATIENT
Start: 2024-01-04 | End: 2024-01-05 | Stop reason: HOSPADM

## 2024-01-04 RX ORDER — SODIUM CHLORIDE 9 MG/ML
INJECTION, SOLUTION INTRAVENOUS CONTINUOUS
Status: DISCONTINUED | OUTPATIENT
Start: 2024-01-04 | End: 2024-01-04

## 2024-01-04 RX ORDER — FUROSEMIDE 40 MG/1
120 TABLET ORAL AS NEEDED
Status: DISCONTINUED | OUTPATIENT
Start: 2024-01-04 | End: 2024-01-05 | Stop reason: HOSPADM

## 2024-01-04 RX ORDER — SODIUM CHLORIDE 9 MG/ML
INJECTION, SOLUTION INTRAVENOUS PRN
Status: DISCONTINUED | OUTPATIENT
Start: 2024-01-04 | End: 2024-01-05 | Stop reason: HOSPADM

## 2024-01-04 RX ORDER — ONDANSETRON 4 MG/1
4 TABLET, ORALLY DISINTEGRATING ORAL EVERY 8 HOURS PRN
Status: DISCONTINUED | OUTPATIENT
Start: 2024-01-04 | End: 2024-01-05 | Stop reason: HOSPADM

## 2024-01-04 RX ORDER — SODIUM CHLORIDE 0.9 % (FLUSH) 0.9 %
5-40 SYRINGE (ML) INJECTION PRN
Status: DISCONTINUED | OUTPATIENT
Start: 2024-01-04 | End: 2024-01-05 | Stop reason: HOSPADM

## 2024-01-04 RX ORDER — DEXTROSE MONOHYDRATE 100 MG/ML
INJECTION, SOLUTION INTRAVENOUS CONTINUOUS PRN
Status: DISCONTINUED | OUTPATIENT
Start: 2024-01-04 | End: 2024-01-05 | Stop reason: HOSPADM

## 2024-01-04 RX ORDER — SODIUM CHLORIDE 0.9 % (FLUSH) 0.9 %
5-40 SYRINGE (ML) INJECTION EVERY 12 HOURS SCHEDULED
Status: DISCONTINUED | OUTPATIENT
Start: 2024-01-04 | End: 2024-01-05 | Stop reason: HOSPADM

## 2024-01-04 RX ORDER — ROCURONIUM BROMIDE 10 MG/ML
INJECTION, SOLUTION INTRAVENOUS PRN
Status: DISCONTINUED | OUTPATIENT
Start: 2024-01-04 | End: 2024-01-04 | Stop reason: SDUPTHER

## 2024-01-04 RX ORDER — EZETIMIBE 10 MG/1
10 TABLET ORAL NIGHTLY
Status: DISCONTINUED | OUTPATIENT
Start: 2024-01-04 | End: 2024-01-05 | Stop reason: HOSPADM

## 2024-01-04 RX ORDER — FENTANYL CITRATE 50 UG/ML
25 INJECTION, SOLUTION INTRAMUSCULAR; INTRAVENOUS EVERY 5 MIN PRN
Status: DISCONTINUED | OUTPATIENT
Start: 2024-01-04 | End: 2024-01-04 | Stop reason: HOSPADM

## 2024-01-04 RX ORDER — PROPOFOL 10 MG/ML
INJECTION, EMULSION INTRAVENOUS PRN
Status: DISCONTINUED | OUTPATIENT
Start: 2024-01-04 | End: 2024-01-04 | Stop reason: SDUPTHER

## 2024-01-04 RX ADMIN — Medication 100 MCG: at 08:26

## 2024-01-04 RX ADMIN — Medication 500 MG: at 16:02

## 2024-01-04 RX ADMIN — SODIUM CHLORIDE, POTASSIUM CHLORIDE, SODIUM LACTATE AND CALCIUM CHLORIDE: 600; 310; 30; 20 INJECTION, SOLUTION INTRAVENOUS at 09:25

## 2024-01-04 RX ADMIN — ONDANSETRON 4 MG: 2 INJECTION INTRAMUSCULAR; INTRAVENOUS at 09:09

## 2024-01-04 RX ADMIN — CEFAZOLIN 2000 MG: 10 INJECTION, POWDER, FOR SOLUTION INTRAVENOUS at 13:51

## 2024-01-04 RX ADMIN — LIDOCAINE HYDROCHLORIDE 80 MG: 20 INJECTION, SOLUTION EPIDURAL; INFILTRATION; INTRACAUDAL; PERINEURAL at 07:30

## 2024-01-04 RX ADMIN — FENTANYL CITRATE 50 MCG: 50 INJECTION INTRAMUSCULAR; INTRAVENOUS at 07:30

## 2024-01-04 RX ADMIN — Medication 2000 MG: at 07:43

## 2024-01-04 RX ADMIN — PROPOFOL 150 MG: 10 INJECTION, EMULSION INTRAVENOUS at 07:30

## 2024-01-04 RX ADMIN — CARVEDILOL 6.25 MG: 6.25 TABLET ORAL at 16:02

## 2024-01-04 RX ADMIN — Medication 100 MCG: at 09:16

## 2024-01-04 RX ADMIN — Medication 100 MCG: at 08:57

## 2024-01-04 RX ADMIN — EZETIMIBE 10 MG: 10 TABLET ORAL at 20:31

## 2024-01-04 RX ADMIN — Medication 10 MG: at 09:04

## 2024-01-04 RX ADMIN — SUGAMMADEX 200 MG: 100 INJECTION, SOLUTION INTRAVENOUS at 09:17

## 2024-01-04 RX ADMIN — SODIUM CHLORIDE, POTASSIUM CHLORIDE, SODIUM LACTATE AND CALCIUM CHLORIDE: 600; 310; 30; 20 INJECTION, SOLUTION INTRAVENOUS at 06:47

## 2024-01-04 RX ADMIN — CEFAZOLIN 2000 MG: 10 INJECTION, POWDER, FOR SOLUTION INTRAVENOUS at 23:53

## 2024-01-04 RX ADMIN — HYDROMORPHONE HYDROCHLORIDE 0.4 MG: 2 INJECTION, SOLUTION INTRAMUSCULAR; INTRAVENOUS; SUBCUTANEOUS at 08:47

## 2024-01-04 RX ADMIN — ROCURONIUM BROMIDE 50 MG: 10 INJECTION, SOLUTION INTRAVENOUS at 07:30

## 2024-01-04 RX ADMIN — Medication 20 MG: at 07:58

## 2024-01-04 RX ADMIN — FENTANYL CITRATE 50 MCG: 50 INJECTION INTRAMUSCULAR; INTRAVENOUS at 07:43

## 2024-01-04 RX ADMIN — Medication 10 MG: at 07:54

## 2024-01-04 RX ADMIN — Medication 10 MG: at 07:51

## 2024-01-04 RX ADMIN — AMLODIPINE BESYLATE 5 MG: 5 TABLET ORAL at 16:02

## 2024-01-04 RX ADMIN — SODIUM CHLORIDE: 9 INJECTION, SOLUTION INTRAVENOUS at 12:32

## 2024-01-04 RX ADMIN — OXYCODONE AND ACETAMINOPHEN 1 TABLET: 5; 325 TABLET ORAL at 14:05

## 2024-01-04 RX ADMIN — HYDROMORPHONE HYDROCHLORIDE 0.2 MG: 2 INJECTION, SOLUTION INTRAMUSCULAR; INTRAVENOUS; SUBCUTANEOUS at 09:23

## 2024-01-04 RX ADMIN — ACETAMINOPHEN 650 MG: 325 TABLET ORAL at 22:26

## 2024-01-04 RX ADMIN — SODIUM CHLORIDE: 9 INJECTION, SOLUTION INTRAVENOUS at 22:28

## 2024-01-04 RX ADMIN — ONDANSETRON 4 MG: 2 INJECTION INTRAMUSCULAR; INTRAVENOUS at 14:43

## 2024-01-04 ASSESSMENT — PAIN DESCRIPTION - DESCRIPTORS: DESCRIPTORS: ACHING

## 2024-01-04 ASSESSMENT — PAIN SCALES - GENERAL
PAINLEVEL_OUTOF10: 3
PAINLEVEL_OUTOF10: 0

## 2024-01-04 ASSESSMENT — PAIN DESCRIPTION - ORIENTATION: ORIENTATION: LOWER

## 2024-01-04 ASSESSMENT — PAIN DESCRIPTION - LOCATION: LOCATION: BACK

## 2024-01-04 ASSESSMENT — PAIN - FUNCTIONAL ASSESSMENT: PAIN_FUNCTIONAL_ASSESSMENT: FACE, LEGS, ACTIVITY, CRY, AND CONSOLABILITY (FLACC)

## 2024-01-04 NOTE — ANESTHESIA POSTPROCEDURE EVALUATION
Department of Anesthesiology  Postprocedure Note    Patient: Hugo Blanca  MRN: [id number]  YOB: 1945  Date of evaluation: 1/4/2024    Procedure Summary       Date: 01/04/24 Room / Location: [address]    Anesthesia Start: 0728 Anesthesia Stop: 0941    Procedure: L3-5 LUMBAR LAMINECTOMY (Back) Diagnosis:       Spinal stenosis of lumbar region, unspecified whether neurogenic claudication present      (Spinal stenosis of lumbar region, unspecified whether neurogenic claudication present [M48.061])    Surgeons: Bernardino Billings MD Responsible Provider: Tera Cardenas MD    Anesthesia Type: general ASA Status: 3            Anesthesia Type: No value filed.    Desirae Phase I: Desirae Score: 8    Desirae Phase II:      Anesthesia Post Evaluation    Patient location during evaluation: PACU  Patient participation: complete - patient participated  Level of consciousness: awake and alert  Airway patency: patent  Nausea & Vomiting: no nausea and no vomiting  Cardiovascular status: hemodynamically stable  Respiratory status: acceptable  Hydration status: euvolemic  Pain management: adequate    No notable events documented.

## 2024-01-04 NOTE — DISCHARGE INSTRUCTIONS
No driving for two weeks after surgery.   No lifting greater than 10 pounds for the first month.   Keep dressing dry and in place until shower on Monday  Remove dressing after shower this Monday      Keep it Clean - Post-Operative Home instructions    These instructions are to help you have the best possible recovery after your surgical procedure.  [facility] is here to support you. If you have questions, call [phone] Monday through Friday from 7:30AM to 8:30PM to speak to a nurse. If you need to speak to someone outside of these hours, call your physician.     Incision Do’s and Don’ts  Do wash hands before and after dressing changes or when you have had any contact with your incision. Use hand  or antibacterial soap.  Do keep your incision clean and dry. It’s OK to wash the skin around your incision with mild soap and water.  Do change your dressing as you were told.   Do notify your doctor if the dressing becomes wet or dirty.  Do use a clean washcloth every time when cleaning your incision.   Do sleep on clean linens.  Do keep pets away from incision site.  Don’t sit in a bathtub, pool, or hot tub until your incision is fully closed and any drains are removed.  Don’t scrub, pick, scratch, or pull at your incision.  Don’t use oils, lotions, or creams on your incision unless your healthcare provider approves it.    Follow-up  You will have one or more follow-up visits with your healthcare provider. These are needed to check how well you’re healing. Your drain, stitches, or staples may also be removed during these visits. Do not miss your follow-up visit, even if you are feeling better.      Call your healthcare provider right away if you have the following:  Fever of 100.4°F (38°C) or higher, or as advised by your healthcare provider.  Chest pain or trouble breathing.  Pain or tenderness in your leg(s).  Increased pain, redness, swelling, bleeding, or foul-smelling drainage at the incision site.  Incision

## 2024-01-04 NOTE — PROGRESS NOTES
CLINICAL PHARMACY NOTE: MEDS TO BEDS    Total # of Prescriptions Filled: 2   The following medications were delivered to the patient:  Percocet 5-325  Tizanidine 2mg    Additional Documentation:

## 2024-01-04 NOTE — OP NOTE
Operative Note      Patient: Hugo Blanca  YOB: 1945  MRN: [id number]    Date of Procedure: 1/4/2024    Pre-Op Diagnosis Codes:     * Spinal stenosis of lumbar region, unspecified whether neurogenic claudication present [M48.061]    Post-Op Diagnosis: Same       Procedure(s):  L3-5 LUMBAR LAMINECTOMY    Surgeon(s):  Bernardino Billings MD    Assistant:   Physician Assistant: Peggy Gómez PA    Anesthesia: General    Estimated Blood Loss (mL): Minimal    Complications: None    Specimens:   * No specimens in log *    Implants:  * No implants in log *      Drains:   Closed/Suction Drain Inferior;Midline Back Bulb (Active)       Findings: Severe lumbar spinal stenosis        Detailed Description of Procedure:   Patient is a 78-year-old male who presents with ongoing complaints of back as well as bilateral lower extremity discomfort consistent with ongoing lumbar radiculopathy.  Radiographic imaging in the form of MRI lumbar spine clearly demonstrates severe focal spinal stenosis at the L3-4 and L4-5 levels.  Further treatment options were discussed, conservative measures versus surgical intervention.  After reviewing the radiographic image as well as discussed the details and risk of surgery, the patient wished of going further conservative measures in favor surgical intervention.  Thus he was scheduled for surgery as outlined below.    The patient was transported from the preanesthesia area to the operating room.  After the adequate induction of general tracheal anesthesia, the patient was placed onto the operating table in the prone position onto a Brandon frame.  The appropriate pressure points about the body were carefully padded.  Preoperative antibiotics were given.  The region over lower back was then prepped and draped in usual sterile fashion.  An incision was made in the midline extending over the L3-L5 spinous processes.  This incision was carried down through the lying tissue to the level  of the deep lumbar fascia.  Next using the monopolar instrument, the lumbar fascia was incised and the dissection carried down bilaterally keep immediately adjacent to the L3-L5 spinous processes.  Next a standard subperiosteal dissection was carried out over the adjacent hemilamina.  Radiopaque instruments were now placed at the L3-4 and L4-5 disc base levels.  An intraoperative x-ray verified as to be at the above-stated levels.  These instruments were removed.  Bynum retractor placed in the wound.  At this point using Leksell instrument the entire L4 and inferior one half of the L3 spinous process was removed and the corresponding hemilamina thin.  Next using the high-speed drill as well as Kerrison instruments, bilateral laminectomies were carried out sequentially at the L4 and L3 levels.  The inferior aspect of the L3 hemilamina was removed to above the top ligamentum flavum.  Ligamentum flavum was then moved to caudal direction to the superior aspect of the L5 hemilamina.  Approximately one third of the superior aspect of the L5 hemilamina was removed bilaterally as well.  The dura could be visualized centrally.  There was severe hourglass deformity to the thecal sac indicative of severe spinal stenosis at each level.  Using further careful dissection and technique, the facet joints were slightly undercut and further prieto of thickened ligamentous tissue removed from the lateral recesses.  In this fashion overall generous decompression of the spinal canal and thecal sac was accomplished.  The exiting nerve roots were completely decompressed from above the takeoff from the thecal sac to the neural exit zones.  Proximal foraminotomies were carried out over the exiting nerve roots bilaterally.  Upon completion the spinal sac appeared very round and full with no further evidence of ongoing compression or impingement.  The dental instrument was then probed into each neural foramen distally with no further

## 2024-01-04 NOTE — H&P
Interval H&P Note    Pt Name: Hugo Blanca  MRN: [id number]  YOB: 1945  Date of evaluation: 1/4/2024      [x] I have reviewed in [facility] the Cardiology Preoperative Clearance Note by Dr Rivera dated 1/2/24 attached below for an Interval History and Physical note.     [x] I have examined  Hugo Blanca, a 78 y.o. male.There are no changes to the patient who is scheduled for L3-5 LUMBAR LAMINECTOMY by Bernardino Billings MD for Spinal stenosis of lumbar region, unspecified whether neurogenic claudicat*. The patient denies new health changes, fever, chills, wheezing, cough, increased SOB, chest pain, open sores or wounds. Last ASA 12/28/23  +DM POC     Cardiac clearance by Lupillo Truong at [facility] dated 1/2/24     IMPRESSIONS/PLAN  1. Pre-operative cardiovascular examination    2. Coronary artery disease of native artery of native heart with stable angina pectoris (CMS-[facility])    3. Postoperative atrial fibrillation (CMS-[facility])    4. S/P CABG (coronary artery bypass graft)    5. Mixed hyperlipidemia    6. LBBB (left bundle branch block)    1. CAD SP CABG  -LIMA to LAD, vein graft to obtuse marginal and residual 90% PDA lesion  -no anginal symptoms    2. Preoperative cardiac clearance/risk assessment  -I would consider this gentleman moderate risk and proceed as planned for this back surgery that he needs   -as planned at moderate risk he has no cardiac symptoms but he can not really walk to a 4 met level but the CABG just fairly recent we know about the 90% PDA and he is asymptomatic. I explained the risks to the patient and his daughter and they like to proceed with surgery this Thursday. Already been holding the aspirin    3. Postop atrial fibrillation  -no recurrence  -now off anticoagulation    4. Dyslipidemia   -maintain statin and Zetia  _______________________ _______________________    Vital signs: /61   Pulse 65   Temp 97.7 °F (36.5 °C)   Resp 16   Ht 1.88 m (6' 2\")   Wt

## 2024-01-04 NOTE — ANESTHESIA PRE PROCEDURE
12/21/2023 09:52 AM    MCV 91.3 12/21/2023 09:52 AM    RDW 12.8 12/21/2023 09:52 AM     12/21/2023 09:52 AM       CMP:   Lab Results   Component Value Date/Time     12/21/2023 09:52 AM    K 4.9 12/21/2023 09:52 AM     12/21/2023 09:52 AM    CO2 25 12/21/2023 09:52 AM    BUN 29 12/21/2023 09:52 AM    CREATININE 1.5 12/21/2023 09:52 AM    LABGLOM 47 12/21/2023 09:52 AM    LABGLOM 44 03/14/2023 12:00 AM    GLUCOSE 142 12/21/2023 09:52 AM    PROT 5.5 12/12/2022 08:21 AM    CALCIUM 9.3 12/21/2023 09:52 AM    BILITOT 0.3 12/12/2022 08:21 AM    ALKPHOS 128 12/12/2022 08:21 AM    AST 23 12/12/2022 08:21 AM    ALT 16 12/12/2022 08:21 AM       POC Tests:   Recent Labs     01/04/24  0644   POCGLU 190*       Coags:   Lab Results   Component Value Date/Time    PROTIME 12.0 12/08/2022 01:48 PM    INR 1.1 12/08/2022 01:48 PM    APTT 44.1 12/05/2022 04:27 PM       HCG (If Applicable): No results found for: \"PREGTESTUR\", \"PREGSERUM\", \"HCG\", \"HCGQUANT\"     ABGs: No results found for: \"PHART\", \"PO2ART\", \"LUO5OTG\", \"EKH8QVD\", \"BEART\", \"A0FHYJAY\"     Type & Screen (If Applicable):  No results found for: \"LABABO\", \"LABRH\"    Drug/Infectious Status (If Applicable):  No results found for: \"HIV\", \"HEPCAB\"    COVID-19 Screening (If Applicable): No results found for: \"COVID19\"        Anesthesia Evaluation  Patient summary reviewed   history of anesthetic complications (Hallucinations / psychosis after anesthesia):   Airway: Mallampati: II  TM distance: >3 FB   Neck ROM: full  Mouth opening: > = 3 FB   Dental:          Pulmonary: breath sounds clear to auscultation                             Cardiovascular:    (+) hypertension:, CAD:, CABG/stent (S/p CABG):, dysrhythmias: atrial fibrillation, hyperlipidemia        Rhythm: regular  Rate: normal  Echocardiogram reviewed  Stress test reviewed             ROS comment: Echo 1/2023:  ·  Left Ventricle: There is mild concentric increased wall   thickness/hypertrophy. Systolic

## 2024-01-04 NOTE — PLAN OF CARE
D/c plan likely for tomorrow after doctor f/u. Pt has minimal c/o pain, had bout of nausea, zofran given. Pt walked and tolerated well. Pt remains A&O x4, on RA, wife at bedside. HERNANDO drain has steady bloody output. Pt ambulated 2x since arrival and tolerating well. Will continue to monitor.   Problem: Chronic Conditions and Co-morbidities  Goal: Patient's chronic conditions and co-morbidity symptoms are monitored and maintained or improved  Outcome: Progressing     Problem: Discharge Planning  Goal: Discharge to home or other facility with appropriate resources  Outcome: Progressing     Problem: Safety - Adult  Goal: Free from fall injury  Outcome: Progressing     Problem: Pain  Goal: Verbalizes/displays adequate comfort level or baseline comfort level  Outcome: Progressing

## 2024-01-05 VITALS
OXYGEN SATURATION: 98 % | DIASTOLIC BLOOD PRESSURE: 61 MMHG | RESPIRATION RATE: 18 BRPM | BODY MASS INDEX: 27.59 KG/M2 | SYSTOLIC BLOOD PRESSURE: 142 MMHG | WEIGHT: 215 LBS | HEART RATE: 60 BPM | HEIGHT: 74 IN | TEMPERATURE: 98.1 F

## 2024-01-05 LAB — GLUCOSE BLD-MCNC: 207 MG/DL (ref 75–110)

## 2024-01-05 PROCEDURE — 2580000003 HC RX 258: Performed by: PHYSICIAN ASSISTANT

## 2024-01-05 PROCEDURE — G0378 HOSPITAL OBSERVATION PER HR: HCPCS

## 2024-01-05 PROCEDURE — 82947 ASSAY GLUCOSE BLOOD QUANT: CPT

## 2024-01-05 PROCEDURE — 6370000000 HC RX 637 (ALT 250 FOR IP): Performed by: PHYSICIAN ASSISTANT

## 2024-01-05 RX ADMIN — OMEPRAZOLE 20 MG: 20 CAPSULE, DELAYED RELEASE ORAL at 05:15

## 2024-01-05 RX ADMIN — ACETAMINOPHEN 650 MG: 325 TABLET ORAL at 09:17

## 2024-01-05 RX ADMIN — SODIUM CHLORIDE, PRESERVATIVE FREE 10 ML: 5 INJECTION INTRAVENOUS at 07:56

## 2024-01-05 RX ADMIN — CARVEDILOL 6.25 MG: 6.25 TABLET ORAL at 07:52

## 2024-01-05 RX ADMIN — GLIPIZIDE 10 MG: 10 TABLET ORAL at 07:53

## 2024-01-05 RX ADMIN — ESCITALOPRAM OXALATE 10 MG: 10 TABLET ORAL at 07:53

## 2024-01-05 RX ADMIN — ROSUVASTATIN CALCIUM 5 MG: 10 TABLET, COATED ORAL at 07:53

## 2024-01-05 RX ADMIN — SODIUM CHLORIDE: 9 INJECTION, SOLUTION INTRAVENOUS at 06:46

## 2024-01-05 ASSESSMENT — PAIN SCALES - GENERAL: PAINLEVEL_OUTOF10: 4

## 2024-01-05 NOTE — PROGRESS NOTES
End Of Shift Note  [facility]  Summary of shift: Patient discharge education, verbal and written given. When to follow up with doctor, signs and symptoms of infections, keep dressing on till Monday and then remove, signs and symptoms of DVT, Stroke. Educated on new medications including percocet and Zanaflex, when to call 911. When medications are due next. Patient new meds are already filled and at bedside. Patient discharged at 1003 on 1/5/2024 via wheelchair to home.    Vitals:    Vitals:    01/04/24 2351 01/05/24 0414 01/05/24 0611 01/05/24 0745   BP: (!) 142/60 117/62 133/72 (!) 142/61   Pulse: 73 65 66 60   Resp: 16 16 18 18   Temp: 98.1 °F (36.7 °C) 98.2 °F (36.8 °C) 98.2 °F (36.8 °C) 98.1 °F (36.7 °C)   TempSrc: Oral Oral Oral Oral   SpO2: 92% 95% 94% 98%   Weight:       Height:            I&O:   Intake/Output Summary (Last 24 hours) at 1/5/2024 1001  Last data filed at 1/5/2024 0912  Gross per 24 hour   Intake 2083.49 ml   Output 190 ml   Net 1893.49 ml       Resp Status: RA    Ventilator Settings:     / / /     Critical Care IV infusions:   sodium chloride Stopped (01/05/24 0754)    sodium chloride      dextrose          LDA:   Closed/Suction Drain Inferior;Midline Back Bulb (Active)   Number of days: 1       Incision 01/04/24 Back Lower;Medial (Active)   Number of days: 1

## 2024-01-05 NOTE — DISCHARGE SUMMARY
Admission date: 1-4-2024  Discharge date: 1-5-2024  Admitting Dx/Principal Dx: lumbar spinal stenosis  Surgeon: Dr. Bernardino Billings  Procedure: bilateral L3-5 lams  Pt was admitted to med/surg floor. During their stay they received IVF, pain control, wound care, and nursing care. There were no complications during their stay. On day of discharge their VSS, OOB to halls, eating and drinking well, urinating normally.  Pt was discharged on POD#1, without complications during stay.  Pt was sent home on Percocet and Zanaflex.  F/U with Dr. Billings in 1 month for first postoperative visit.  Disposition: home  Condition on discharge: good

## 2024-01-05 NOTE — PROGRESS NOTES
2024  7:16 AM     Hugo Blanca    1945   [id number]      SUBJECTIVE: Uneventful PM per nursing, pt doing well this AM. C/O some low back discomfort, states no new N/T in UE or LE. OOB to halls. Eating and drinking well.    OBJECTIVE      Physical      Temperature Range (24h):Temp: 98.2 °F (36.8 °C) Temp  Av.6 °F (36.4 °C)  Min: 96.8 °F (36 °C)  Max: 98.2 °F (36.8 °C)  BP Range (24h): Systolic (24hrs), Av , Min:117 , Max:162     Diastolic (24hrs), Av, Min:51, Max:72    Pulse Range (24h): Pulse  Av.8  Min: 58  Max: 73  Respiration Range (24h): Resp  Av.6  Min: 8  Max: 20  Current Pulse Ox (24h):  SpO2: 94 %  Pulse Ox Range (24h):  SpO2  Av.6 %  Min: 92 %  Max: 100 %    In: 2976.7 [P.O.:50; I.V.:2827.3]  Out: 215 [Drains:190]    Good strength and sensation in both UE and LE.  Incision CDI. Dressing changed at bedside.      ASSESSMENT AND PLAN    78 y.o. male status post L3-5 lams post op day #  1    1. OK for patient to be discharged home today if doing well, eating and drinking, OOB to halls, urinating normally. F/U care and wound care instructions given at bedside. F/U with Dr. Billings in 1 months.      Electronically signed by Bernardino Billings MD on 2024 at 7:16 AM

## 2024-01-05 NOTE — PLAN OF CARE
Pt alert and oriented x4, currently on room air. Dressing to lower back remains clean dry and intact. HERNANDO drain in place with sanguineous drainage. Pt tolerating ambulation well. Tylenol given for pain but pt would like to try to stay away from any stronger medications if possible. Safety maintained throughout shift, call light within reach.     Problem: Chronic Conditions and Co-morbidities  Goal: Patient's chronic conditions and co-morbidity symptoms are monitored and maintained or improved  1/5/2024 0310 by Thuy Gonsalves RN  Outcome: Progressing     Problem: Discharge Planning  Goal: Discharge to home or other facility with appropriate resources  1/5/2024 0310 by Thuy Gonsalves RN  Outcome: Progressing     Problem: Safety - Adult  Goal: Free from fall injury  1/5/2024 0310 by Thuy Gonsalves RN  Outcome: Progressing     Problem: Pain  Goal: Verbalizes/displays adequate comfort level or baseline comfort level  1/5/2024 0310 by Thuy Gonsalves, RN  Outcome: Progressing

## 2024-01-31 ENCOUNTER — OFFICE VISIT (OUTPATIENT)
Age: 79
End: 2024-01-31

## 2024-01-31 VITALS — HEART RATE: 57 BPM | DIASTOLIC BLOOD PRESSURE: 65 MMHG | TEMPERATURE: 98.1 F | SYSTOLIC BLOOD PRESSURE: 147 MMHG

## 2024-01-31 DIAGNOSIS — M48.062 SPINAL STENOSIS OF LUMBAR REGION WITH NEUROGENIC CLAUDICATION: Primary | ICD-10-CM

## 2024-01-31 PROCEDURE — 99024 POSTOP FOLLOW-UP VISIT: CPT | Performed by: NEUROLOGICAL SURGERY

## 2024-01-31 NOTE — PROGRESS NOTES
[facility] NEUROSCIENCE [place], [facility] NEUROSURGERY  [address], [address]  [place] [address]  Dept: [phone]  Dept Fax: [phone]     Patient:  Hugo Blanca  YOB: 1945  Date: 1/31/24      Chief Complaint   Patient presents with    Post-Op Check     L3-5 LAMI on 1/4/24           HPI:     I had the pleasure of seeing this patient back in the office today in follow-up.  As you know he is a 78-year-old male who underwent a bilateral L3-L5 decompressive laminectomy approxi-1 month ago.  Postoperatively he has done well.  He has no ongoing discomfort in lower extremities.  He indicates his back feels much improved.  He is off pain medication.  Overall he indicates he feels remarkably improved compared to his preoperative status.    Examination today demonstrates his back incision to be healing well.  Incision is flat, nonerythematous nontender.  He has good strength and sensation in both lower extremities.  Gait is steady.    Overall I think this patient is doing well from his lower back surgery for spinal stenosis 1 month ago.  I took this opportunity to discuss remaining activity restrictions and limitations for the upcoming several months.  I took this opportunity to answer intermittent questions he and his family may have had.  I did clearly invite him to feel free to contact me should he have further problems or questions which I could be of assistance with in the interim.  Again I will have him return to my office in approxi-2 months for his next postoperative visit.        Physical Exam:      BP (!) 147/65 (Site: Left Upper Arm, Position: Sitting)   Pulse 57   Temp 98.1 °F (36.7 °C)         Assessment and Plan:     1. Spinal stenosis of lumbar region with neurogenic claudication              Electronically signed by Bernardino Billings MD on 1/31/2024 at 10:05 AM    Please note that this chart was generated using voice

## 2024-04-08 ENCOUNTER — OFFICE VISIT (OUTPATIENT)
Age: 79
End: 2024-04-08

## 2024-04-08 VITALS — HEART RATE: 79 BPM | DIASTOLIC BLOOD PRESSURE: 76 MMHG | SYSTOLIC BLOOD PRESSURE: 152 MMHG

## 2024-04-08 DIAGNOSIS — M48.062 SPINAL STENOSIS OF LUMBAR REGION WITH NEUROGENIC CLAUDICATION: Primary | ICD-10-CM

## 2024-04-08 PROCEDURE — 99024 POSTOP FOLLOW-UP VISIT: CPT | Performed by: PHYSICIAN ASSISTANT

## 2024-04-08 RX ORDER — GABAPENTIN 100 MG/1
CAPSULE ORAL
COMMUNITY
Start: 2024-03-23

## 2024-04-08 RX ORDER — POTASSIUM CHLORIDE 20 MEQ/1
20 TABLET, EXTENDED RELEASE ORAL DAILY
COMMUNITY
Start: 2024-02-02

## 2024-04-08 RX ORDER — POTASSIUM CHLORIDE 750 MG/1
10 CAPSULE, EXTENDED RELEASE ORAL DAILY
COMMUNITY
Start: 2024-03-25

## 2024-04-08 NOTE — PROGRESS NOTES
I had the pleasure of seeing Hugo WADSWORTH Rianna in the office today in follow up. Patient is a 79 y.o.. male who underwent lower back surgery in the past in the form of an L3-L5 laminectomy 3 months ago.  Postoperatively he has done quite well.  He states his back and lower extremity pain has improved completely compared to his preoperative status.  He has no new complaints today.  Vitals:    04/08/24 0959   BP: (!) 152/76   Pulse: 79     Examination today demonstrates the incision to be healing well. The incision is nonerythematous and nontender.  Patient has age-appropriate strength in both upper and lower extremities.  Gait is steady.    I reviewed remaining activity restrictions and limitations for the upcoming months.  I did take this opportunity to answer any remaining questions the patient had in the office today and welcomed them to feel free to contact us back at any point in the future should they have any problems or questions we could be of assistance with.

## (undated) DEVICE — C-ARM: Brand: UNBRANDED

## (undated) DEVICE — SYRINGE IRRIG 60ML SFT PLIABLE BLB EZ TO GRP 1 HND USE W/

## (undated) DEVICE — DRAPE,UTILTY,TAPE,15X26, 4EA/PK: Brand: MEDLINE

## (undated) DEVICE — 1010 S-DRAPE TOWEL DRAPE 10/BX: Brand: STERI-DRAPE™

## (undated) DEVICE — CODMAN® SURGICAL PATTIES 1/2" X 1/2" (1.27CM X 1.27CM): Brand: CODMAN®

## (undated) DEVICE — OPTIFOAM GENTLE AG,POST-OP STRIP,3.5X14: Brand: MEDLINE

## (undated) DEVICE — SYRINGE MED 10ML TRNSLUC BRL PLUNG BLK MRK POLYPR CTRL

## (undated) DEVICE — YANKAUER,FLEXIBLE HANDLE,REGLR CAPACITY: Brand: MEDLINE INDUSTRIES, INC.

## (undated) DEVICE — SOLUTION IV 250ML 0.9% SOD CHL PH 5 INJ USP VIAFLX PLAS

## (undated) DEVICE — BIPOLAR ELECTROHEMOSTASIS CATHETER: Brand: INJECTION GOLD PROBE

## (undated) DEVICE — CODMAN® SURGICAL PATTIES 1/2" X 3" (1.27CM X 7.62CM): Brand: CODMAN®

## (undated) DEVICE — MARKER,SKIN,WI/RULER AND LABELS: Brand: MEDLINE

## (undated) DEVICE — NEEDLE HYPO 25GA L1.5IN BLU POLYPR HUB S STL REG BVL STR

## (undated) DEVICE — GLOVE SURG SZ 75 CRM LTX FREE POLYISOPRENE POLYMER BEAD ANTI

## (undated) DEVICE — Device

## (undated) DEVICE — 3.0MM PRECISION NEURO (MATCH HEAD)

## (undated) DEVICE — GAUZE, BORDER, 3"X6", 1.5"X4"PAD, STERIL: Brand: MEDLINE INDUSTRIES, INC.

## (undated) DEVICE — GLOVE SURG SZ 7 L12IN FNGR THK79MIL GRN LTX FREE

## (undated) DEVICE — SHEET, T, LAPAROTOMY, STERILE: Brand: MEDLINE

## (undated) DEVICE — GLOVE SURG SZ 75 L12IN FNGR THK79MIL GRN LTX FREE

## (undated) DEVICE — ELECTRODE ES L3IN S STL BLDE INSUL DISP VALLEYLAB EDGE

## (undated) DEVICE — TUBING MALIS W/BIPOLAR

## (undated) DEVICE — SKIN PREP TRAY W/CHG: Brand: MEDLINE INDUSTRIES, INC.

## (undated) DEVICE — INTENDED FOR TISSUE SEPARATION, AND OTHER PROCEDURES THAT REQUIRE A SHARP SURGICAL BLADE TO PUNCTURE OR CUT.: Brand: BARD-PARKER ® CARBON RIB-BACK BLADES

## (undated) DEVICE — SUTURE D SPEC VCRL 2 0 D8876

## (undated) DEVICE — DRESSING BORDERED ADH GZ UNIV GEN USE 8INX4IN AND 6INX2IN

## (undated) DEVICE — TUBING, SUCTION, 1/4" X 12', STRAIGHT: Brand: MEDLINE

## (undated) DEVICE — NEPTUNE E-SEP 165MM SUCTION SLEEVE: Brand: NEPTUNE E-SEP

## (undated) DEVICE — SUTURE VCRL SZ 0 L18IN ABSRB UD L36MM CT-1 1/2 CIR J840D

## (undated) DEVICE — SURGICAL SUCTION CONNECTING TUBE WITH MALE CONNECTOR AND SUCTION CLAMP, 2 FT. LONG (.6 M), 5 MM I.D.: Brand: CONMED

## (undated) DEVICE — DRESSING FOAM W4XL10IN AG SIL ADH ANTIMIC POSTOP OPTIFOAM

## (undated) DEVICE — SOLUTION IRRIG 1000ML 0.9% SOD CHL USP POUR PLAS BTL

## (undated) DEVICE — GLOVE SURG SZ 65 CRM LTX FREE POLYISOPRENE POLYMER BEAD ANTI